# Patient Record
Sex: MALE | Race: WHITE | Employment: FULL TIME | URBAN - METROPOLITAN AREA
[De-identification: names, ages, dates, MRNs, and addresses within clinical notes are randomized per-mention and may not be internally consistent; named-entity substitution may affect disease eponyms.]

---

## 2017-02-13 ENCOUNTER — TELEPHONE (OUTPATIENT)
Dept: INTERNAL MEDICINE | Facility: CLINIC | Age: 57
End: 2017-02-13

## 2017-02-13 DIAGNOSIS — E78.5 HYPERLIPIDEMIA LDL GOAL <130: ICD-10-CM

## 2017-02-13 DIAGNOSIS — I10 ESSENTIAL HYPERTENSION, BENIGN: ICD-10-CM

## 2017-02-13 RX ORDER — LISINOPRIL 40 MG/1
40 TABLET ORAL DAILY
Qty: 90 TABLET | Refills: 3 | Status: SHIPPED | OUTPATIENT
Start: 2017-02-13 | End: 2017-02-15

## 2017-02-13 RX ORDER — PRAVASTATIN SODIUM 40 MG
40 TABLET ORAL AT BEDTIME
Qty: 90 TABLET | Refills: 3 | Status: SHIPPED | OUTPATIENT
Start: 2017-02-13 | End: 2017-02-15

## 2017-02-13 NOTE — TELEPHONE ENCOUNTER
Reason for Call:  Medication or medication refill:    Do you use a [facility]?  Name of the pharmacy and phone number for the current request:  [facility] [address] [place] - [phone]    Name of the medication requested: lisinopril, pravastatin    Other request: pt is out of medication--pt has an appt with Dr Suggs 2/15    Can we leave a detailed message on this number? YES    Phone number patient can be reached at: Other phone number:  [phone]--Wife,Suzanne Kowalski Time: anytime    Call taken on 2/13/2017 at 10:18 AM by TEA GILL

## 2017-02-14 DIAGNOSIS — I10 ESSENTIAL HYPERTENSION, BENIGN: ICD-10-CM

## 2017-02-14 DIAGNOSIS — Z12.5 SPECIAL SCREENING FOR MALIGNANT NEOPLASM OF PROSTATE: ICD-10-CM

## 2017-02-14 DIAGNOSIS — E78.5 HYPERLIPIDEMIA LDL GOAL <130: ICD-10-CM

## 2017-02-14 LAB
ALBUMIN SERPL-MCNC: 4.2 G/DL (ref 3.4–5)
ALP SERPL-CCNC: 78 U/L (ref 40–150)
ALT SERPL W P-5'-P-CCNC: 53 U/L (ref 0–70)
ANION GAP SERPL CALCULATED.3IONS-SCNC: 6 MMOL/L (ref 3–14)
AST SERPL W P-5'-P-CCNC: 29 U/L (ref 0–45)
BASOPHILS # BLD AUTO: 0 10E9/L (ref 0–0.2)
BASOPHILS NFR BLD AUTO: 0.3 %
BILIRUB SERPL-MCNC: 0.6 MG/DL (ref 0.2–1.3)
BUN SERPL-MCNC: 10 MG/DL (ref 7–30)
CALCIUM SERPL-MCNC: 9.3 MG/DL (ref 8.5–10.1)
CHLORIDE SERPL-SCNC: 103 MMOL/L (ref 94–109)
CHOLEST SERPL-MCNC: 180 MG/DL
CO2 SERPL-SCNC: 29 MMOL/L (ref 20–32)
CREAT SERPL-MCNC: 0.7 MG/DL (ref 0.66–1.25)
DIFFERENTIAL METHOD BLD: NORMAL
EOSINOPHIL # BLD AUTO: 0.2 10E9/L (ref 0–0.7)
EOSINOPHIL NFR BLD AUTO: 2.1 %
ERYTHROCYTE [DISTWIDTH] IN BLOOD BY AUTOMATED COUNT: 12.9 % (ref 10–15)
GFR SERPL CREATININE-BSD FRML MDRD: ABNORMAL ML/MIN/1.7M2
GLUCOSE SERPL-MCNC: 100 MG/DL (ref 70–99)
HCT VFR BLD AUTO: 47.9 % (ref 40–53)
HDLC SERPL-MCNC: 35 MG/DL
HGB BLD-MCNC: 16.1 G/DL (ref 13.3–17.7)
LDLC SERPL CALC-MCNC: 111 MG/DL
LYMPHOCYTES # BLD AUTO: 2.5 10E9/L (ref 0.8–5.3)
LYMPHOCYTES NFR BLD AUTO: 35.1 %
MCH RBC QN AUTO: 30.6 PG (ref 26.5–33)
MCHC RBC AUTO-ENTMCNC: 33.6 G/DL (ref 31.5–36.5)
MCV RBC AUTO: 91 FL (ref 78–100)
MONOCYTES # BLD AUTO: 0.9 10E9/L (ref 0–1.3)
MONOCYTES NFR BLD AUTO: 13.1 %
NEUTROPHILS # BLD AUTO: 3.5 10E9/L (ref 1.6–8.3)
NEUTROPHILS NFR BLD AUTO: 49.4 %
NONHDLC SERPL-MCNC: 145 MG/DL
PLATELET # BLD AUTO: 245 10E9/L (ref 150–450)
POTASSIUM SERPL-SCNC: 3.8 MMOL/L (ref 3.4–5.3)
PROT SERPL-MCNC: 7.8 G/DL (ref 6.8–8.8)
PSA SERPL-ACNC: 0.5 UG/L (ref 0–4)
RBC # BLD AUTO: 5.27 10E12/L (ref 4.4–5.9)
SODIUM SERPL-SCNC: 138 MMOL/L (ref 133–144)
TRIGL SERPL-MCNC: 172 MG/DL
WBC # BLD AUTO: 7 10E9/L (ref 4–11)

## 2017-02-14 PROCEDURE — 36415 COLL VENOUS BLD VENIPUNCTURE: CPT | Performed by: INTERNAL MEDICINE

## 2017-02-14 PROCEDURE — 85025 COMPLETE CBC W/AUTO DIFF WBC: CPT | Performed by: INTERNAL MEDICINE

## 2017-02-14 PROCEDURE — G0103 PSA SCREENING: HCPCS | Performed by: INTERNAL MEDICINE

## 2017-02-14 PROCEDURE — 80061 LIPID PANEL: CPT | Performed by: INTERNAL MEDICINE

## 2017-02-14 PROCEDURE — 80053 COMPREHEN METABOLIC PANEL: CPT | Performed by: INTERNAL MEDICINE

## 2017-02-15 ENCOUNTER — OFFICE VISIT (OUTPATIENT)
Dept: INTERNAL MEDICINE | Facility: CLINIC | Age: 57
End: 2017-02-15
Payer: COMMERCIAL

## 2017-02-15 VITALS
HEIGHT: 70 IN | SYSTOLIC BLOOD PRESSURE: 162 MMHG | BODY MASS INDEX: 36.78 KG/M2 | HEART RATE: 91 BPM | WEIGHT: 256.9 LBS | OXYGEN SATURATION: 99 % | TEMPERATURE: 98.2 F | DIASTOLIC BLOOD PRESSURE: 68 MMHG

## 2017-02-15 DIAGNOSIS — I10 ESSENTIAL HYPERTENSION, BENIGN: ICD-10-CM

## 2017-02-15 DIAGNOSIS — E78.5 HYPERLIPIDEMIA LDL GOAL <130: ICD-10-CM

## 2017-02-15 DIAGNOSIS — Z00.00 ENCOUNTER FOR ROUTINE ADULT HEALTH EXAMINATION WITHOUT ABNORMAL FINDINGS: Primary | ICD-10-CM

## 2017-02-15 DIAGNOSIS — F51.01 PRIMARY INSOMNIA: ICD-10-CM

## 2017-02-15 DIAGNOSIS — K21.9 GASTROESOPHAGEAL REFLUX DISEASE WITHOUT ESOPHAGITIS: ICD-10-CM

## 2017-02-15 DIAGNOSIS — Z23 NEED FOR PROPHYLACTIC VACCINATION AND INOCULATION AGAINST INFLUENZA: ICD-10-CM

## 2017-02-15 DIAGNOSIS — J98.01 POST-INFECTION BRONCHOSPASM: ICD-10-CM

## 2017-02-15 PROCEDURE — 99396 PREV VISIT EST AGE 40-64: CPT | Performed by: INTERNAL MEDICINE

## 2017-02-15 RX ORDER — PRAVASTATIN SODIUM 40 MG
40 TABLET ORAL AT BEDTIME
Qty: 90 TABLET | Refills: 3 | Status: SHIPPED | OUTPATIENT
Start: 2017-02-15 | End: 2018-03-12

## 2017-02-15 RX ORDER — ZOLPIDEM TARTRATE 10 MG/1
5-10 TABLET ORAL
Qty: 30 TABLET | Refills: 1 | Status: SHIPPED | OUTPATIENT
Start: 2017-02-15 | End: 2017-04-10

## 2017-02-15 RX ORDER — ALBUTEROL SULFATE 90 UG/1
2 AEROSOL, METERED RESPIRATORY (INHALATION) EVERY 6 HOURS PRN
Qty: 1 INHALER | Refills: 2 | Status: SHIPPED | OUTPATIENT
Start: 2017-02-15 | End: 2017-05-09

## 2017-02-15 RX ORDER — LISINOPRIL 40 MG/1
40 TABLET ORAL DAILY
Qty: 90 TABLET | Refills: 3 | Status: SHIPPED | OUTPATIENT
Start: 2017-02-15 | End: 2018-03-12

## 2017-02-15 NOTE — PROGRESS NOTES
SUBJECTIVE:                                                    Ab Aguilar is a 56 year old male who presents to clinic today for the following health issues:      Hyperlipidemia Follow-Up      Rate your low fat/cholesterol diet?: good    Taking statin?  Yes, no muscle aches from statin    Other lipid medications/supplements?:  none     Hypertension Follow-up      Outpatient blood pressures are not being checked.    Low Salt Diet: no added salt       Amount of exercise or physical activity: active at work    Problems taking medications regularly: No    Medication side effects: none  Diet: low salt and low fat/cholesterol  Medication Followup of Ambien    Taking Medication as prescribed: NO-pt has been out, he currently has trouble staying asleep    Side Effects:  None    Medication Helping Symptoms:  Medication did help sx when he was taking the medication. Pt was taking OTC sleep aids like unisom w/ out relief         Injectable Influenza Immunization Documentation    1.  Is the person to be vaccinated sick today?  GETTING OVER A COLD    2. Does the person to be vaccinated have an allergy to eggs or to a component of the vaccine?  No    3. Has the person to be vaccinated today ever had a serious reaction to influenza vaccine in the past?  No    4. Has the person to be vaccinated ever had Guillain-Du Pont syndrome?  No     Form completed by Lois Harley CMA

## 2017-02-15 NOTE — PATIENT INSTRUCTIONS
"  *  Continue all medications at the same doses.  Contact your usual pharmacy if you need refills.       *  Return to see me in 1 year, sooner if needed.  Please get fasting labs done at the [facility] or any other [facility] lab 1-2 days before this appointment.  If you get the labs done at another [facility], make arrangements with them directly.  The orders will be in place.  Eat nothing for at least 8 hours prior to having these labs drawn.  Call [phone] to schedule appointments at [facility].       5 GOALS TO PREVENT VASCULAR DISEASE:     1.  Aggressive blood pressure control, under 130/80 ideally.  Using medications if needed.    Your blood pressure is under good control    BP Readings from Last 4 Encounters:   02/15/17 162/68   02/02/16 130/82   11/07/14 (!) 156/104   09/26/13 130/78       2.  Aggressive LDL cholesterol (\"bad cholesterol\") lowering as indicated.    Your goal is an LDL under 130 for sure, preferably under 100.  (If you have diabetes or previous vascular disease, the the LDL goals would be under 100 for sure, preferably under 70.)    New guidelines identify four high-risk groups who could benefit from statins:   *people with pre-existing heart disease, such as those who have had a heart attack;   *people ages 40 to 75 who have diabetes of any type  *patients ages 40 to 75 with at least a 7.5% risk of developing cardiovascular disease over the next decade, according to a formula described in the guidelines  *patients with the sort of super-high cholesterol that sometimes runs in families, as evidenced by an LDL of 190 milligrams per deciliter or higher    Your cholesterol levels are well controlled.    Recent Labs   Lab Test  02/14/17   0852  01/27/16   0746  10/30/14   0751  09/23/13   0821   CHOL  180  242*  220*  237*   HDL  35*  43  43  47   LDL  111*  168*  139*  129   TRIG  172*  155*  192*  303*   CHOLHDLRATIO   --    --   5.1*  5.1*       3.  " "Aggressive diabetic prevention, screening and/or management.      You do not have diabetes as of the most recent blood tests.     4.  No smoking    5.  Consider taking low dose aspirin (81 mg) tablet once per day over the age of 50, every day unless there is a specific reason that you cannot take aspirin (such as side effect, allergy, or you are on another \"blood thinner\").        --Based on your current cardiac risk factors, you should take Aspirin 81 mg once per day if you are over 50 years of age.         Post infectious Bronchospasm (wheezing):  ===============================================      *  Bronchospasm is irritation of the airways that causes them to spasm and temporarily \"narrow\" which causes coughing (usually minimal sputum production), shortness of breath, dyspnea on exertion, wheezing.  Your airways will be more reactive to things such as temperature changes (too cold, too hot, too humid, etc.), activity, pollutants such as dander, smoke, air pollution, etc.  This will get better with time, but will produce lingering symptoms as described above for a couple weeks to a couple of months.     *  Antibiotics not indicated    *  Albuterol inhaler, use 2 puffs, 3-5 times per day as needed for any coughing, wheezing, tightness in the breathing, or shortness of breath.  This inhaled medication helps reduce the inflammation and spasm of the airways which will help the breathing become easier.  This is a similar medication we use to treat asthma, but you do not have asthma.      Consider using this inhaler before any known triggers for our coughing or wheezing (usually these include cold or hot temperatures, humidity, dust, air pollutants, smoke).      *  Expect that your airways may remain more easily irritated for a few weeks after upper respiratory infections.     If you require the albuterol rescue inhaler on a regular basis more than a few times per week, you may need additional medications to help " control the breathing better.    These are the available forms of Albuterol, your insurance formulary will determine which one is preferred.  They all work the same.                 *  Cough suppressant Delsym, follow directions on bottle    *  Mucinex extended release, one or two tablets twice per day for the next 5-7 days.  This helps loosen the secretions to they can be passed more easily out of the body.     *  Be sure to drink lots of fluids.  If the appetite and intake of food is way down, then at leat try to eat soup.  Dehydration is the thing that causes people to end up in the hospital with viral infections and the flu.     *  For sore throat:  Try lozenges (Cepostat, Cepacol, hard candies, Zinc lozenges, etc)    *  If you have thick secretions:  Mucinex extended release twice per day on a regular basis for the next few days, then twice per day as needed.  OK to take the regular Mucinex, you may just have to take it 2-3 times per day.      *  If you have dry nasal passages or frequent bloody noses during the winter time:  Saline nasal spray as often as needed for dry nose.     *  If you have a lot of congestion and/or runny noses:  OK to try decongestants as needed (e.g. pseudoephedrine or phenylephrine).  Be sure to take the lowest dose needed.  Take decongestants from only ONE source.  It is possible to inadvertantly take more than the recommended amounts if you take decongestants from multiple products (i.e. Do NOT take Mucinex-D and Claritin-D together)    *  If you have been told to NOT take decongestants or if you cannot tolerate decongestants due to effect on blood pressure, sleep or heart rate:  Try Coricidin HBP or Chlortrimeton (chlorpheniramine).  These can have a similar effect as some decongestants but will not affect your heart rate or blood pressure.      *  If you have SEVERE nasal congestion:  Affrin nasal spray as needed for severe nasal congestion (especially before the airplane trip),  but do not use for more than one week.      *  Tylenol as needed for any headaches of low grade temperatures.     *  Ibuprofen as needed for body aches, fevers, headaches    *  Call the clinic if any changes in the symptoms such as worsening fevers, or the amount and quality of the sputum changes, or if you have any major difficulties breathing, or the symptoms fail to clear for a few weeks.    *  Most upper respiratory infection will clear 1-2 weeks, but it is not uncommon for post viral coughs to last for several weeks, even rarely the entire winter.             Preventive Health Recommendations  Male Ages 50 - 64    Yearly exam:             See your health care provider every year in order to  o   Review health changes.   o   Discuss preventive care.    o   Review your medicines if your doctor has prescribed any.     Have a cholesterol test every 5 years, or more frequently if you are at risk for high cholesterol/heart disease.     Have a diabetes test (fasting glucose) every three years. If you are at risk for diabetes, you should have this test more often.     Have a colonoscopy at age 50, or have a yearly FIT test (stool test). These exams will check for colon cancer.      Talk with your health care provider about whether or not a prostate cancer screening test (PSA) is right for you.    You should be tested each year for STDs (sexually transmitted diseases), if you re at risk.     Shots: Get a flu shot each year. Get a tetanus shot every 10 years.     Nutrition:    Eat at least 5 servings of fruits and vegetables daily.     Eat whole-grain bread, whole-wheat pasta and brown rice instead of white grains and rice.     Talk to your provider about Calcium and Vitamin D.        --Good Grains:  Oats, brown rice, Quinoa (these do not raise the blood sugar as much)     --Bad grains:  Anything made from wheat or white rice     (because these raise the blood sugars significantly, and the possible gluten issue from  "wheat for some people).      --Proteins:  Aim for \"lean proteins\" including chicken, fish, seafood, pork, turkey, and eggs (in moderation); Eat red meat only occasionally        Stan Portillo:                    Lifestyle    Exercise for at least 150 minutes a week (30 minutes a day, 5 days a week). This will help you control your weight and prevent disease.     Limit alcohol to one drink per day.     No smoking.     Wear sunscreen to prevent skin cancer.     See your dentist every six months for an exam and cleaning.     See your eye doctor every 1 to 2 years.    "

## 2017-02-15 NOTE — PROGRESS NOTES
SUBJECTIVE:     CC: Ab Aguilar is an 56 year old male who presents for preventative health visit.     Healthy Habits:    Do you get at least three servings of calcium containing foods daily (dairy, green leafy vegetables, etc.)? yes    Amount of exercise or daily activities, outside of work:     Problems taking medications regularly No    Medication side effects: No    Have you had an eye exam in the past two years? yes    Do you see a dentist twice per year? yes    Do you have sleep apnea, excessive snoring or daytime drowsiness?no    1.    Blood presure remains well controlled at home.  He ran out of medications a few days ago and has been without meds for few days.  Readings outside clinic are within normal limits.  Reviewed last 6 BP readings in chart:  BP Readings from Last 6 Encounters:   02/27/17 124/82   02/15/17 162/68   02/02/16 130/82   11/07/14 (!) 156/104   09/26/13 130/78   09/26/12 136/80     He has not experienced any significant side effects from medicaiotns for hypertension.    NO active cardiac complaints or symptoms with exercise.     2.  Has history of hyperlipidemia.  On statin for this, denies any significant side effects of this medication.      Latest labs reviewed:    Recent Labs   Lab Test  02/14/17   0852  01/27/16   0746  10/30/14   0751  09/23/13   0821   CHOL  180  242*  220*  237*   HDL  35*  43  43  47   LDL  111*  168*  139*  129   TRIG  172*  155*  192*  303*   CHOLHDLRATIO   --    --   5.1*  5.1*        Lab Results   Component Value Date    AST 29 02/14/2017             Today's PHQ-2 Score:   PHQ-2 ( 1999 Pfizer) 2/27/2017 2/15/2017   Q1: Little interest or pleasure in doing things 0 0   Q2: Feeling down, depressed or hopeless 0 0   PHQ-2 Score 0 0       Abuse: Current or Past(Physical, Sexual or Emotional)- No  Do you feel safe in your environment - Yes    Social History   Substance Use Topics     Smoking status: Never Smoker     Smokeless tobacco: Never Used     Alcohol  use Yes      Comment: 12-18 q week BEER     The patient does not drink >3 drinks per day nor >7 drinks per week.    Last PSA:   PSA   Date Value Ref Range Status   02/14/2017 0.50 0 - 4 ug/L Final     Comment:     Assay Method:  Chemiluminescence using Siemens Vista analyzer       Recent Labs   Lab Test  02/14/17   0852  01/27/16   0746  10/30/14   0751  09/23/13   0821   CHOL  180  242*  220*  237*   HDL  35*  43  43  47   LDL  111*  168*  139*  129   TRIG  172*  155*  192*  303*   CHOLHDLRATIO   --    --   5.1*  5.1*   NHDL  145*  199*   --    --        Reviewed orders with patient. Reviewed health maintenance and updated orders accordingly - Yes    All Histories reviewed and updated in Epic.    Past Medical History:  ---------------------------  Past Medical History   Diagnosis Date     Esophageal reflux      Essential hypertension, benign      Major depression      Obesity      Other and unspecified hyperlipidemia      Prediabetes 9/26/12     A1C 6.1       Past Surgical History:  ---------------------------  Past Surgical History   Procedure Laterality Date     C nonspecific procedure  1991     R ankle fracture, repair     C nonspecific procedure  age 6     B inguinal hernia repair     C nonspecific procedure  9/01     lumbar laminectomy     Stress echo ([facility])  10/02     normal      Stress echo ([facility])  9/05     normal stress echo       Current Medications:  ---------------------------  Current Outpatient Prescriptions   Medication Sig Dispense Refill     zolpidem (AMBIEN) 10 MG tablet Take 0.5-1 tablets (5-10 mg) by mouth nightly as needed for sleep 30 tablet 1     lisinopril (PRINIVIL/ZESTRIL) 40 MG tablet Take 1 tablet (40 mg) by mouth daily 90 tablet 3     pravastatin (PRAVACHOL) 40 MG tablet Take 1 tablet (40 mg) by mouth At Bedtime 90 tablet 3     albuterol (PROAIR HFA/PROVENTIL HFA/VENTOLIN HFA) 108 (90 BASE) MCG/ACT Inhaler Inhale 2 puffs into the lungs every 6 hours as needed for shortness of breath /  dyspnea or wheezing 1 Inhaler 2     omeprazole (PRILOSEC) 20 MG capsule Take 1 capsule by mouth daily.       ibuprofen (ADVIL,MOTRIN) 200 MG tablet Take 800 mg by mouth 3 times daily as needed.       ASPIRIN 81 MG OR TABS ONE DAILY       MULTIVITAMINS OR TABS        guaiFENesin-codeine (ROBITUSSIN AC) 100-10 MG/5ML SOLN solution Take 5-10 mLs by mouth every 6 hours as needed (FOR SEVERE COUGHING ONLY) 180 mL 0       Allergies:  -------------  Allergies   Allergen Reactions     Atorvastatin GI Disturbance     Abdominal and intestinal pains from atorvastatin       Social History:  -------------------  Social History     Social History     Marital status:      Spouse name: N/A     Number of children: N/A     Years of education: N/A     Occupational History     Not on file.     Social History Main Topics     Smoking status: Never Smoker     Smokeless tobacco: Never Used     Alcohol use Yes      Comment: 12-18 q week BEER     Drug use: No     Sexual activity: Yes     Partners: Female     Other Topics Concern     Caffeine Concern No     Social History Narrative       Family Medical History:  ------------------------------  Family History   Problem Relation Age of Onset     Hypertension Father      HEART DISEASE Brother 37     b. 1967, 1st MI at age 37     Family History Negative Mother      Family History Negative Brother      b. 1965     Family History Negative Sister      b. 1961        ROS:  C: NEGATIVE for fever, chills, change in weight  I: NEGATIVE for worrisome rashes, moles or lesions  E: NEGATIVE for vision changes or irritation  ENT: NEGATIVE for ear, mouth and throat problems  R: NEGATIVE for significant cough or SOB  CV: NEGATIVE for chest pain, palpitations or peripheral edema  GI: NEGATIVE for nausea, abdominal pain, heartburn, or change in bowel habits   male: negative for dysuria, hematuria, decreased urinary stream, erectile dysfunction, urethral discharge  M: NEGATIVE for significant arthralgias  "or myalgia  N: NEGATIVE for weakness, dizziness or paresthesias  P: NEGATIVE for changes in mood or affect      OBJECTIVE:     /68  Pulse 91  Temp 98.2  F (36.8  C) (Oral)  Ht 5' 10\" (1.778 m)  Wt 256 lb 14.4 oz (116.5 kg)  SpO2 99%  BMI 36.86 kg/m2  EXAM:  GENERAL alert and no distress.  EYES conjunctivae/corneas clear. PERRL, EOM's intact  HENT: NCAT,oral and posterior pharynx without lesions or erythema, facies symmetric  NECK: Neck supple. No LAD, without thyroidmegaly or JVD.  RESP: Clear to ausculation bilaterally without wheezes or crackles. Normal BS in all fields.  CV: RRR normal S1S2 without  murmurs, rubs or gallops. PMI normal  LYMPH: no cervical lymph adenopathy appreciated  GI: NTND, no organomegaly, normal BS in all quadrants, without rebound or guarding  MS: No cyanosis, clubbing or edema noted bilaterally in Upper and/or Lower Extremities  SKIN: no significant ulcers, lesions or rashes on the visualized portions of the skin  NEURO: Alert and Oriented x 3, Gait normal. Reflexes normal and symmetric. Sensation grossly WNL..  PSYCH: Alert and oriented times 3; speech- coherent , normal rate and volume; able to articulate logical thoughts, able to abstract reason, no tangential thoughts, no hallucinations or delusions, affect- normal     ASSESSMENT/PLAN:       (Z00.00) Encounter for routine adult health examination without abnormal findings  (primary encounter diagnosis)  Comment: Discussed cardiac disease risk factor modification including screening for and treating HTN, lipids, DM, and smoking cessation.  Also discussed age appropriate cancer screening recommendations including testicular, prostate, colon and lung cancer as dictated by age group.  Recommended low fat, low salt diet and moderation in any alcohol intake.  Recommended always using seatbelts when in a car.  Recommended never driving after drinking or riding with someone who has been drinking as well.       Plan:     (I10) Essential " hypertension, benign  Comment: This condition is currently controlled on the current medical regimen when he takes his medications.  He ran out of medications a few days ago and has been without meds for few days.  Continue current therapy.   Discussed hypertension in detail including JNC VIII guidelines for blood pressure goals.  Discussed indication for treatment and treatment options.  Discussed the importance for aggressive management of HTN to prevent vascular complications later.  Recommended lower fat, lower carbohydrate, and lower sodium (<2000 mg)diet.  Discussed required intervals for follow up on HTN, lab studies, and the need to aggresive management of other cardiac disease risk factors.  Recommened pt. follow their blood pressures outside the clinic to ensure that BPs are remaining within guidelines, and to contact me if the readings are not within guidelines so we can adjust treatment as needed.    Plan: lisinopril (PRINIVIL/ZESTRIL) 40 MG tablet            (E78.5) Hyperlipidemia LDL goal <130  Comment: Discussed current lipid results, previous results (if available) current guidelines (NCEP) for treatment and goals for lipids.  Discussed lifestyle modification, dietary changes (low fat, low simple carb) and regular aerobic exercise.  Discussed the link between dysmetabolic syndrome and impaired glucose tolerance seen in certain patterns of lipids.  Briefly discussed medication used for lipid lowering, including the statins are their possible side effects of myalgias, rhabdomyolysis, and liver toxicity.   Plan: pravastatin (PRAVACHOL) 40 MG tablet            (K21.9) Gastroesophageal reflux disease without esophagitis  Comment: This condition is currently controlled on the current medical regimen.  Continue current therapy.   Plan:     (F51.01) Primary insomnia  Comment:   Plan: zolpidem (AMBIEN) 10 MG tablet            (J98.01) Post-infection bronchospasm  Comment: occ coughing, wants refill sof  "inhaler  Plan: albuterol (PROAIR HFA/PROVENTIL HFA/VENTOLIN         HFA) 108 (90 BASE) MCG/ACT Inhaler            (Z23) Need for prophylactic vaccination and inoculation against influenza  Comment:   Plan: FLU VACCINE, 3 YRS +, IM (QUADRIVALENT         W/PRESERVATIVES/MULTI-DOSE) [47943], Vaccine         Administration, Initial [95005]               COUNSELING:  Reviewed preventive health counseling, as reflected in patient instructions       Regular exercise       Healthy diet/nutrition       Vision screening       Hearing screening       Aspirin Prophylaxsis       Alcohol Use       Colon cancer screening       Prostate cancer screening         reports that he has never smoked. He has never used smokeless tobacco.    Estimated body mass index is 36.86 kg/(m^2) as calculated from the following:    Height as of this encounter: 5' 10\" (1.778 m).    Weight as of this encounter: 256 lb 14.4 oz (116.5 kg).       Counseling Resources:  ATP IV Guidelines  Pooled Cohorts Equation Calculator  FRAX Risk Assessment  ICSI Preventive Guidelines  Dietary Guidelines for Americans, 2010  USDA's MyPlate  ASA Prophylaxis  Lung CA Screening    Shayne Suggs MD  [facility]  "

## 2017-02-15 NOTE — NURSING NOTE
"Chief Complaint   Patient presents with     Hypertension     med refill/recheck/review labs     Lipids     med refill/recheck/review labs       Initial /68  Pulse 91  Temp 98.2  F (36.8  C) (Oral)  Ht 5' 10\" (1.778 m)  Wt 256 lb 14.4 oz (116.5 kg)  SpO2 99%  BMI 36.86 kg/m2 Estimated body mass index is 36.86 kg/(m^2) as calculated from the following:    Height as of this encounter: 5' 10\" (1.778 m).    Weight as of this encounter: 256 lb 14.4 oz (116.5 kg).  Medication Reconciliation: complete   Lois Harley CMA      "

## 2017-02-15 NOTE — MR AVS SNAPSHOT
"              After Visit Summary   2/15/2017    Ab Aguilar    MRN: [id number]           Patient Information     Date Of Birth          1960        Visit Information        Provider Department      2/15/2017 1:40 PM Shayne Suggs MD [facility]        Today's Diagnoses     Encounter for routine adult health examination without abnormal findings    -  1    Essential hypertension, benign        Hyperlipidemia LDL goal <130        Gastroesophageal reflux disease without esophagitis        Primary insomnia        Post-infection bronchospasm        Need for prophylactic vaccination and inoculation against influenza          Care Instructions      *  Continue all medications at the same doses.  Contact your usual pharmacy if you need refills.       *  Return to see me in 1 year, sooner if needed.  Please get fasting labs done at the [facility] or any other [facility] lab 1-2 days before this appointment.  If you get the labs done at another [facility], make arrangements with them directly.  The orders will be in place.  Eat nothing for at least 8 hours prior to having these labs drawn.  Call [phone] to schedule appointments at [facility].       5 GOALS TO PREVENT VASCULAR DISEASE:     1.  Aggressive blood pressure control, under 130/80 ideally.  Using medications if needed.    Your blood pressure is under good control    BP Readings from Last 4 Encounters:   02/15/17 162/68   02/02/16 130/82   11/07/14 (!) 156/104   09/26/13 130/78       2.  Aggressive LDL cholesterol (\"bad cholesterol\") lowering as indicated.    Your goal is an LDL under 130 for sure, preferably under 100.  (If you have diabetes or previous vascular disease, the the LDL goals would be under 100 for sure, preferably under 70.)    New guidelines identify four high-risk groups who could benefit from statins:   *people with pre-existing heart disease, such as those who have had " "a heart attack;   *people ages 40 to 75 who have diabetes of any type  *patients ages 40 to 75 with at least a 7.5% risk of developing cardiovascular disease over the next decade, according to a formula described in the guidelines  *patients with the sort of super-high cholesterol that sometimes runs in families, as evidenced by an LDL of 190 milligrams per deciliter or higher    Your cholesterol levels are well controlled.    Recent Labs   Lab Test  02/14/17   0852  01/27/16   0746  10/30/14   0751  09/23/13   0821   CHOL  180  242*  220*  237*   HDL  35*  43  43  47   LDL  111*  168*  139*  129   TRIG  172*  155*  192*  303*   CHOLHDLRATIO   --    --   5.1*  5.1*       3.  Aggressive diabetic prevention, screening and/or management.      You do not have diabetes as of the most recent blood tests.     4.  No smoking    5.  Consider taking low dose aspirin (81 mg) tablet once per day over the age of 50, every day unless there is a specific reason that you cannot take aspirin (such as side effect, allergy, or you are on another \"blood thinner\").        --Based on your current cardiac risk factors, you should take Aspirin 81 mg once per day if you are over 50 years of age.         Post infectious Bronchospasm (wheezing):  ===============================================      *  Bronchospasm is irritation of the airways that causes them to spasm and temporarily \"narrow\" which causes coughing (usually minimal sputum production), shortness of breath, dyspnea on exertion, wheezing.  Your airways will be more reactive to things such as temperature changes (too cold, too hot, too humid, etc.), activity, pollutants such as dander, smoke, air pollution, etc.  This will get better with time, but will produce lingering symptoms as described above for a couple weeks to a couple of months.     *  Antibiotics not indicated    *  Albuterol inhaler, use 2 puffs, 3-5 times per day as needed for any coughing, wheezing, tightness in the " breathing, or shortness of breath.  This inhaled medication helps reduce the inflammation and spasm of the airways which will help the breathing become easier.  This is a similar medication we use to treat asthma, but you do not have asthma.      Consider using this inhaler before any known triggers for our coughing or wheezing (usually these include cold or hot temperatures, humidity, dust, air pollutants, smoke).      *  Expect that your airways may remain more easily irritated for a few weeks after upper respiratory infections.     If you require the albuterol rescue inhaler on a regular basis more than a few times per week, you may need additional medications to help control the breathing better.    These are the available forms of Albuterol, your insurance formulary will determine which one is preferred.  They all work the same.                 *  Cough suppressant Delsym, follow directions on bottle    *  Mucinex extended release, one or two tablets twice per day for the next 5-7 days.  This helps loosen the secretions to they can be passed more easily out of the body.     *  Be sure to drink lots of fluids.  If the appetite and intake of food is way down, then at leat try to eat soup.  Dehydration is the thing that causes people to end up in the hospital with viral infections and the flu.     *  For sore throat:  Try lozenges (Cepostat, Cepacol, hard candies, Zinc lozenges, etc)    *  If you have thick secretions:  Mucinex extended release twice per day on a regular basis for the next few days, then twice per day as needed.  OK to take the regular Mucinex, you may just have to take it 2-3 times per day.      *  If you have dry nasal passages or frequent bloody noses during the winter time:  Saline nasal spray as often as needed for dry nose.     *  If you have a lot of congestion and/or runny noses:  OK to try decongestants as needed (e.g. pseudoephedrine or phenylephrine).  Be sure to take the lowest dose  needed.  Take decongestants from only ONE source.  It is possible to inadvertantly take more than the recommended amounts if you take decongestants from multiple products (i.e. Do NOT take Mucinex-D and Claritin-D together)    *  If you have been told to NOT take decongestants or if you cannot tolerate decongestants due to effect on blood pressure, sleep or heart rate:  Try Coricidin HBP or Chlortrimeton (chlorpheniramine).  These can have a similar effect as some decongestants but will not affect your heart rate or blood pressure.      *  If you have SEVERE nasal congestion:  Affrin nasal spray as needed for severe nasal congestion (especially before the airplane trip), but do not use for more than one week.      *  Tylenol as needed for any headaches of low grade temperatures.     *  Ibuprofen as needed for body aches, fevers, headaches    *  Call the clinic if any changes in the symptoms such as worsening fevers, or the amount and quality of the sputum changes, or if you have any major difficulties breathing, or the symptoms fail to clear for a few weeks.    *  Most upper respiratory infection will clear 1-2 weeks, but it is not uncommon for post viral coughs to last for several weeks, even rarely the entire winter.             Preventive Health Recommendations  Male Ages 50 - 64    Yearly exam:             See your health care provider every year in order to  o   Review health changes.   o   Discuss preventive care.    o   Review your medicines if your doctor has prescribed any.     Have a cholesterol test every 5 years, or more frequently if you are at risk for high cholesterol/heart disease.     Have a diabetes test (fasting glucose) every three years. If you are at risk for diabetes, you should have this test more often.     Have a colonoscopy at age 50, or have a yearly FIT test (stool test). These exams will check for colon cancer.      Talk with your health care provider about whether or not a prostate  "cancer screening test (PSA) is right for you.    You should be tested each year for STDs (sexually transmitted diseases), if you re at risk.     Shots: Get a flu shot each year. Get a tetanus shot every 10 years.     Nutrition:    Eat at least 5 servings of fruits and vegetables daily.     Eat whole-grain bread, whole-wheat pasta and brown rice instead of white grains and rice.     Talk to your provider about Calcium and Vitamin D.        --Good Grains:  Oats, brown rice, Quinoa (these do not raise the blood sugar as much)     --Bad grains:  Anything made from wheat or white rice     (because these raise the blood sugars significantly, and the possible gluten issue from wheat for some people).      --Proteins:  Aim for \"lean proteins\" including chicken, fish, seafood, pork, turkey, and eggs (in moderation); Eat red meat only occasionally        Stan Portillo:                    Lifestyle    Exercise for at least 150 minutes a week (30 minutes a day, 5 days a week). This will help you control your weight and prevent disease.     Limit alcohol to one drink per day.     No smoking.     Wear sunscreen to prevent skin cancer.     See your dentist every six months for an exam and cleaning.     See your eye doctor every 1 to 2 years.          Follow-ups after your visit        Who to contact     If you have questions or need follow up information about today's clinic visit or your schedule please contact [facility] directly at [phone].  Normal or non-critical lab and imaging results will be communicated to you by MyChart, letter or phone within 4 business days after the clinic has received the results. If you do not hear from us within 7 days, please contact the clinic through MyChart or phone. If you have a critical or abnormal lab result, we will notify you by phone as soon as possible.  Submit refill requests through [employer] or call your pharmacy and they will forward the refill request to " "us. Please allow 3 business days for your refill to be completed.          Additional Information About Your Visit        MyChart Information     [employer] lets you send messages to your doctor, view your test results, renew your prescriptions, schedule appointments and more. To sign up, go to [email or web address] . Click on \"Log in\" on the left side of the screen, which will take you to the Welcome page. Then click on \"Sign up Now\" on the right side of the page.     You will be asked to enter the access code listed below, as well as some personal information. Please follow the directions to create your username and password.     Your access code is: [email or web address]  Expires: 2017  2:27 PM     Your access code will  in 90 days. If you need help or a new code, please call your [facility] clinic or [phone].        Care EveryWhere ID     This is your Care EveryWhere ID. This could be used by other organizations to access your [facility] medical records  [id number]        Your Vitals Were     Pulse Temperature Height Pulse Oximetry BMI (Body Mass Index)       91 98.2  F (36.8  C) (Oral) 5' 10\" (1.778 m) 99% 36.86 kg/m2        Blood Pressure from Last 3 Encounters:   02/15/17 162/68   16 130/82   14 (!) 156/104    Weight from Last 3 Encounters:   02/15/17 256 lb 14.4 oz (116.5 kg)   16 273 lb (123.8 kg)   14 265 lb 11.2 oz (120.5 kg)              We Performed the Following     FLU VACCINE, 3 YRS +, IM (QUADRIVALENT W/PRESERVATIVES/MULTI-DOSE) [09963]     Vaccine Administration, Initial [55453]          Today's Medication Changes          These changes are accurate as of: 2/15/17  2:27 PM.  If you have any questions, ask your nurse or doctor.               Start taking these medicines.        Dose/Directions    albuterol 108 (90 BASE) MCG/ACT Inhaler   Commonly known as:  PROAIR HFA/PROVENTIL HFA/VENTOLIN HFA   Used for:  Post-infection bronchospasm   Started by:  Shayne Suggs " MD Dixon        Dose:  2 puff   Inhale 2 puffs into the lungs every 6 hours as needed for shortness of breath / dyspnea or wheezing   Quantity:  1 Inhaler   Refills:  2            Where to get your medicines      These medications were sent to [id number] - [place], [place] - [address] AT [facility] [address]  [address], [facility] [address]    Hours:  24-hours Phone:  [phone]     lisinopril 40 MG tablet    pravastatin 40 MG tablet         Some of these will need a paper prescription and others can be bought over the counter.  Ask your nurse if you have questions.     Bring a paper prescription for each of these medications     albuterol 108 (90 BASE) MCG/ACT Inhaler    zolpidem 10 MG tablet                Primary Care Provider Office Phone # Fax #    Shayne Dixon Suggs [phone]       [facility] [address]  [facility] [address]        Thank you!     Thank you for choosing [facility]  for your care. Our goal is always to provide you with excellent care. Hearing back from our patients is one way we can continue to improve our services. Please take a few minutes to complete the written survey that you may receive in the mail after your visit with us. Thank you!             Your Updated Medication List - Protect others around you: Learn how to safely use, store and throw away your medicines at www.disposemymeds.org.          This list is accurate as of: 2/15/17  2:27 PM.  Always use your most recent med list.                   Brand Name Dispense Instructions for use    albuterol 108 (90 BASE) MCG/ACT Inhaler    PROAIR HFA/PROVENTIL HFA/VENTOLIN HFA    1 Inhaler    Inhale 2 puffs into the lungs every 6 hours as needed for shortness of breath / dyspnea or wheezing       aspirin 81 MG tablet      ONE DAILY       ibuprofen 200 MG tablet    ADVIL/MOTRIN     Take 800 mg by mouth 3 times daily as needed.       lisinopril  40 MG tablet    PRINIVIL/ZESTRIL    90 tablet    Take 1 tablet (40 mg) by mouth daily       multivitamin per tablet          omeprazole 20 MG CR capsule    priLOSEC     Take 1 capsule by mouth daily.       pravastatin 40 MG tablet    PRAVACHOL    90 tablet    Take 1 tablet (40 mg) by mouth At Bedtime       zolpidem 10 MG tablet    AMBIEN    30 tablet    Take 0.5-1 tablets (5-10 mg) by mouth nightly as needed for sleep

## 2017-02-27 ENCOUNTER — OFFICE VISIT (OUTPATIENT)
Dept: INTERNAL MEDICINE | Facility: CLINIC | Age: 57
End: 2017-02-27
Payer: COMMERCIAL

## 2017-02-27 VITALS
DIASTOLIC BLOOD PRESSURE: 82 MMHG | BODY MASS INDEX: 36.72 KG/M2 | OXYGEN SATURATION: 98 % | WEIGHT: 255.9 LBS | SYSTOLIC BLOOD PRESSURE: 124 MMHG | TEMPERATURE: 98.8 F | HEART RATE: 87 BPM

## 2017-02-27 DIAGNOSIS — R07.0 THROAT PAIN: ICD-10-CM

## 2017-02-27 DIAGNOSIS — J06.9 VIRAL URI WITH COUGH: ICD-10-CM

## 2017-02-27 DIAGNOSIS — F32.5 MAJOR DEPRESSIVE DISORDER WITH SINGLE EPISODE, IN FULL REMISSION (H): ICD-10-CM

## 2017-02-27 DIAGNOSIS — J98.01 BRONCHOSPASM: Primary | ICD-10-CM

## 2017-02-27 DIAGNOSIS — J02.9 ACUTE PHARYNGITIS, UNSPECIFIED ETIOLOGY: ICD-10-CM

## 2017-02-27 LAB
DEPRECATED S PYO AG THROAT QL EIA: NORMAL
MICRO REPORT STATUS: NORMAL
SPECIMEN SOURCE: NORMAL

## 2017-02-27 PROCEDURE — 99214 OFFICE O/P EST MOD 30 MIN: CPT | Performed by: INTERNAL MEDICINE

## 2017-02-27 PROCEDURE — 87081 CULTURE SCREEN ONLY: CPT | Performed by: INTERNAL MEDICINE

## 2017-02-27 PROCEDURE — 87880 STREP A ASSAY W/OPTIC: CPT | Performed by: INTERNAL MEDICINE

## 2017-02-27 RX ORDER — CODEINE PHOSPHATE AND GUAIFENESIN 10; 100 MG/5ML; MG/5ML
1-2 SOLUTION ORAL EVERY 6 HOURS PRN
Qty: 180 ML | Refills: 0 | Status: SHIPPED | OUTPATIENT
Start: 2017-02-27 | End: 2017-03-13

## 2017-02-27 RX ORDER — PREDNISONE 20 MG/1
40 TABLET ORAL DAILY
Qty: 10 TABLET | Refills: 0 | Status: SHIPPED | OUTPATIENT
Start: 2017-02-27 | End: 2017-03-04

## 2017-02-27 RX ORDER — AZITHROMYCIN 250 MG/1
TABLET, FILM COATED ORAL
Qty: 6 TABLET | Refills: 0 | Status: SHIPPED | OUTPATIENT
Start: 2017-02-27 | End: 2017-03-04

## 2017-02-27 NOTE — NURSING NOTE
"Chief Complaint   Patient presents with     Cough     X 3 weeks       Initial /82  Pulse 87  Temp 98.8  F (37.1  C) (Oral)  Wt 255 lb 14.4 oz (116.1 kg)  SpO2 98%  BMI 36.72 kg/m2 Estimated body mass index is 36.72 kg/(m^2) as calculated from the following:    Height as of 2/15/17: 5' 10\" (1.778 m).    Weight as of this encounter: 255 lb 14.4 oz (116.1 kg).  Medication Reconciliation: complete   Lois Harley CMA      "

## 2017-02-27 NOTE — MR AVS SNAPSHOT
"              After Visit Summary   2/27/2017    Ab Aguilar    MRN: [id number]           Patient Information     Date Of Birth          1960        Visit Information        Provider Department      2/27/2017 11:00 AM Shayne Suggs MD [facility]        Today's Diagnoses     Throat pain    -  1    Bronchospasm        Viral URI with cough        Acute pharyngitis, unspecified etiology          Care Instructions    Post infectious Bronchospasm (wheezing):  ===============================================      *  Bronchospasm is irritation of the airways that causes them to spasm and temporarily \"narrow\" which causes coughing (usually minimal sputum production), shortness of breath, dyspnea on exertion, wheezing.  Your airways will be more reactive to things such as temperature changes (too cold, too hot, too humid, etc.), activity, pollutants such as dander, smoke, air pollution, etc.  This will get better with time, but will produce lingering symptoms as described above for a couple weeks to a couple of months.     *  Antibiotics possibly indicated:  Azithromycin., 2 tablet today, then one tablet per day for 4 days, (5 day total course)    *  Due to the degree of inflammation inside the airways, take Prednisone 40 mg once per day for 5 days.      *  *  For severe coughing, OK to use Rpbitussin with codeine cough syrup, take 1 or 2 teaspoons (5-10 ml) every 4-6 hours as needed for severe coughing.  This is a powerful cough suppressant.  Beware of drowsiness, nausea, vomiting, when taking this medication.  Do not drive, or operate dangerous equipment after taking this.       *  Albuterol inhaler, use 2 puffs, 3-5 times per day as needed for any coughing, wheezing, tightness in the breathing, or shortness of breath.  This inhaled medication helps reduce the inflammation and spasm of the airways which will help the breathing become easier.  This is a similar medication we use " to treat asthma, but you do not have asthma.      Consider using this inhaler before any known triggers for our coughing or wheezing (usually these include cold or hot temperatures, humidity, dust, air pollutants, smoke).      *  Expect that your airways may remain more easily irritated for a few weeks after upper respiratory infections.     If you require the albuterol rescue inhaler on a regular basis more than a few times per week, you may need additional medications to help control the breathing better.    These are the available forms of Albuterol, your insurance formulary will determine which one is preferred.  They all work the same.                 *  Cough suppressant Delsym, follow directions on bottle    *  Mucinex extended release, one or two tablets twice per day for the next 5-7 days.  This helps loosen the secretions to they can be passed more easily out of the body.     *  Be sure to drink lots of fluids.  If the appetite and intake of food is way down, then at leat try to eat soup.  Dehydration is the thing that causes people to end up in the hospital with viral infections and the flu.     *  For sore throat:  Try lozenges (Cepostat, Cepacol, hard candies, Zinc lozenges, etc)    *  If you have thick secretions:  Mucinex extended release twice per day on a regular basis for the next few days, then twice per day as needed.  OK to take the regular Mucinex, you may just have to take it 2-3 times per day.      *  If you have dry nasal passages or frequent bloody noses during the winter time:  Saline nasal spray as often as needed for dry nose.     *  If you have a lot of congestion and/or runny noses:  OK to try decongestants as needed (e.g. pseudoephedrine or phenylephrine).  Be sure to take the lowest dose needed.  Take decongestants from only ONE source.  It is possible to inadvertantly take more than the recommended amounts if you take decongestants from multiple products (i.e. Do NOT take Mucinex-D  and Claritin-D together)    *  If you have been told to NOT take decongestants or if you cannot tolerate decongestants due to effect on blood pressure, sleep or heart rate:  Try Coricidin HBP or Chlortrimeton (chlorpheniramine).  These can have a similar effect as some decongestants but will not affect your heart rate or blood pressure.      *  If you have SEVERE nasal congestion:  Affrin nasal spray as needed for severe nasal congestion (especially before the airplane trip), but do not use for more than one week.      *  Tylenol as needed for any headaches of low grade temperatures.     *  Ibuprofen as needed for body aches, fevers, headaches    *  Call the clinic if any changes in the symptoms such as worsening fevers, or the amount and quality of the sputum changes, or if you have any major difficulties breathing, or the symptoms fail to clear for a few weeks.    *  Most upper respiratory infection will clear 1-2 weeks, but it is not uncommon for post viral coughs to last for several weeks, even rarely the entire winter.               Follow-ups after your visit        Who to contact     If you have questions or need follow up information about today's clinic visit or your schedule please contact [facility] directly at [phone].  Normal or non-critical lab and imaging results will be communicated to you by Igglihart, letter or phone within 4 business days after the clinic has received the results. If you do not hear from us within 7 days, please contact the clinic through Igglihart or phone. If you have a critical or abnormal lab result, we will notify you by phone as soon as possible.  Submit refill requests through Terracotta or call your pharmacy and they will forward the refill request to us. Please allow 3 business days for your refill to be completed.          Additional Information About Your Visit        Terracotta Information     Terracotta gives you secure access to your electronic  health record. If you see a primary care provider, you can also send messages to your care team and make appointments. If you have questions, please call your primary care clinic.  If you do not have a primary care provider, please call [phone] and they will assist you.        Care EveryWhere ID     This is your Care EveryWhere ID. This could be used by other organizations to access your [facility] medical records  [id number]        Your Vitals Were     Pulse Temperature Pulse Oximetry BMI (Body Mass Index)          87 98.8  F (37.1  C) (Oral) 98% 36.72 kg/m2         Blood Pressure from Last 3 Encounters:   02/27/17 124/82   02/15/17 162/68   02/02/16 130/82    Weight from Last 3 Encounters:   02/27/17 255 lb 14.4 oz (116.1 kg)   02/15/17 256 lb 14.4 oz (116.5 kg)   02/02/16 273 lb (123.8 kg)              We Performed the Following     Beta strep group A culture     Strep, Rapid Screen          Today's Medication Changes          These changes are accurate as of: 2/27/17 12:22 PM.  If you have any questions, ask your nurse or doctor.               Start taking these medicines.        Dose/Directions    azithromycin 250 MG tablet   Commonly known as:  ZITHROMAX   Used for:  Acute pharyngitis, unspecified etiology   Started by:  Shayne Suggs MD        Two tablets first day, then one tablet daily for four days.   Quantity:  6 tablet   Refills:  0       guaiFENesin-codeine 100-10 MG/5ML Soln solution   Commonly known as:  ROBITUSSIN AC   Used for:  Bronchospasm, Viral URI with cough   Started by:  Shayne Suggs MD        Dose:  1-2 tsp.   Take 5-10 mLs by mouth every 6 hours as needed (FOR SEVERE COUGHING ONLY)   Quantity:  180 mL   Refills:  0       predniSONE 20 MG tablet   Commonly known as:  DELTASONE   Used for:  Viral URI with cough   Started by:  Shayne Suggs MD        Dose:  40 mg   Take 2 tablets (40 mg) by mouth daily for 5 days   Quantity:  10 tablet   Refills:  0             Where to get your medicines      These medications were sent to [facility] - [place], [place] - [address] AT [facility] [address], [facility] [address]    Hours:  24-hours Phone:  [phone]     azithromycin 250 MG tablet    predniSONE 20 MG tablet         Some of these will need a paper prescription and others can be bought over the counter.  Ask your nurse if you have questions.     Bring a paper prescription for each of these medications     guaiFENesin-codeine 100-10 MG/5ML Soln solution                Primary Care Provider Office Phone # Fax #    Shayne Suggs [phone]       [facility] [address]  [facility] [address]        Thank you!     Thank you for choosing [facility]  for your care. Our goal is always to provide you with excellent care. Hearing back from our patients is one way we can continue to improve our services. Please take a few minutes to complete the written survey that you may receive in the mail after your visit with us. Thank you!             Your Updated Medication List - Protect others around you: Learn how to safely use, store and throw away your medicines at www.disposemymeds.org.          This list is accurate as of: 2/27/17 12:22 PM.  Always use your most recent med list.                   Brand Name Dispense Instructions for use    albuterol 108 (90 BASE) MCG/ACT Inhaler    PROAIR HFA/PROVENTIL HFA/VENTOLIN HFA    1 Inhaler    Inhale 2 puffs into the lungs every 6 hours as needed for shortness of breath / dyspnea or wheezing       aspirin 81 MG tablet      ONE DAILY       azithromycin 250 MG tablet    ZITHROMAX    6 tablet    Two tablets first day, then one tablet daily for four days.       guaiFENesin-codeine 100-10 MG/5ML Soln solution    ROBITUSSIN AC    180 mL    Take 5-10 mLs by mouth every 6 hours as needed (FOR SEVERE COUGHING ONLY)       ibuprofen 200 MG  tablet    ADVIL/MOTRIN     Take 800 mg by mouth 3 times daily as needed.       lisinopril 40 MG tablet    PRINIVIL/ZESTRIL    90 tablet    Take 1 tablet (40 mg) by mouth daily       multivitamin per tablet          omeprazole 20 MG CR capsule    priLOSEC     Take 1 capsule by mouth daily.       pravastatin 40 MG tablet    PRAVACHOL    90 tablet    Take 1 tablet (40 mg) by mouth At Bedtime       predniSONE 20 MG tablet    DELTASONE    10 tablet    Take 2 tablets (40 mg) by mouth daily for 5 days       zolpidem 10 MG tablet    AMBIEN    30 tablet    Take 0.5-1 tablets (5-10 mg) by mouth nightly as needed for sleep

## 2017-02-27 NOTE — PATIENT INSTRUCTIONS
"Post infectious Bronchospasm (wheezing):  ===============================================      *  Bronchospasm is irritation of the airways that causes them to spasm and temporarily \"narrow\" which causes coughing (usually minimal sputum production), shortness of breath, dyspnea on exertion, wheezing.  Your airways will be more reactive to things such as temperature changes (too cold, too hot, too humid, etc.), activity, pollutants such as dander, smoke, air pollution, etc.  This will get better with time, but will produce lingering symptoms as described above for a couple weeks to a couple of months.     *  Antibiotics possibly indicated:  Azithromycin., 2 tablet today, then one tablet per day for 4 days, (5 day total course)    *  Due to the degree of inflammation inside the airways, take Prednisone 40 mg once per day for 5 days.      *  *  For severe coughing, OK to use Rpbitussin with codeine cough syrup, take 1 or 2 teaspoons (5-10 ml) every 4-6 hours as needed for severe coughing.  This is a powerful cough suppressant.  Beware of drowsiness, nausea, vomiting, when taking this medication.  Do not drive, or operate dangerous equipment after taking this.       *  Albuterol inhaler, use 2 puffs, 3-5 times per day as needed for any coughing, wheezing, tightness in the breathing, or shortness of breath.  This inhaled medication helps reduce the inflammation and spasm of the airways which will help the breathing become easier.  This is a similar medication we use to treat asthma, but you do not have asthma.      Consider using this inhaler before any known triggers for our coughing or wheezing (usually these include cold or hot temperatures, humidity, dust, air pollutants, smoke).      *  Expect that your airways may remain more easily irritated for a few weeks after upper respiratory infections.     If you require the albuterol rescue inhaler on a regular basis more than a few times per week, you may need additional " medications to help control the breathing better.    These are the available forms of Albuterol, your insurance formulary will determine which one is preferred.  They all work the same.                 *  Cough suppressant Delsym, follow directions on bottle    *  Mucinex extended release, one or two tablets twice per day for the next 5-7 days.  This helps loosen the secretions to they can be passed more easily out of the body.     *  Be sure to drink lots of fluids.  If the appetite and intake of food is way down, then at leat try to eat soup.  Dehydration is the thing that causes people to end up in the hospital with viral infections and the flu.     *  For sore throat:  Try lozenges (Cepostat, Cepacol, hard candies, Zinc lozenges, etc)    *  If you have thick secretions:  Mucinex extended release twice per day on a regular basis for the next few days, then twice per day as needed.  OK to take the regular Mucinex, you may just have to take it 2-3 times per day.      *  If you have dry nasal passages or frequent bloody noses during the winter time:  Saline nasal spray as often as needed for dry nose.     *  If you have a lot of congestion and/or runny noses:  OK to try decongestants as needed (e.g. pseudoephedrine or phenylephrine).  Be sure to take the lowest dose needed.  Take decongestants from only ONE source.  It is possible to inadvertantly take more than the recommended amounts if you take decongestants from multiple products (i.e. Do NOT take Mucinex-D and Claritin-D together)    *  If you have been told to NOT take decongestants or if you cannot tolerate decongestants due to effect on blood pressure, sleep or heart rate:  Try Coricidin HBP or Chlortrimeton (chlorpheniramine).  These can have a similar effect as some decongestants but will not affect your heart rate or blood pressure.      *  If you have SEVERE nasal congestion:  Affrin nasal spray as needed for severe nasal congestion (especially before  the airplane trip), but do not use for more than one week.      *  Tylenol as needed for any headaches of low grade temperatures.     *  Ibuprofen as needed for body aches, fevers, headaches    *  Call the clinic if any changes in the symptoms such as worsening fevers, or the amount and quality of the sputum changes, or if you have any major difficulties breathing, or the symptoms fail to clear for a few weeks.    *  Most upper respiratory infection will clear 1-2 weeks, but it is not uncommon for post viral coughs to last for several weeks, even rarely the entire winter.

## 2017-02-27 NOTE — PROGRESS NOTES
SUBJECTIVE:                                                    bA Aguilar is a 56 year old male who presents to clinic today for the following health issues:      Concern - Cough     Onset: 3 weeks    Description:   Pt main complaint is lingering cough. Sometimes productive w/ yellow phlegm, fever, soreness in chest from coughing    Intensity: severe    Progression of Symptoms:  worsening    Accompanying Signs & Symptoms:  had fever over the weekend 102-103, fever broke last night       Previous history of similar problem:   yes    Precipitating factors:   Worsened by:     Alleviating factors:  Improved by: nebulizer        Therapies Tried and outcome: nebulizer with some relief, tylenol for fever w/ relief, OTC flu medicine w/ out relief          Problem list and histories reviewed & adjusted, as indicated.  Additional history: as documented        Past Medical History:  ---------------------------  Past Medical History   Diagnosis Date     Esophageal reflux      Essential hypertension, benign      Major depression      Obesity      Other and unspecified hyperlipidemia      Prediabetes 9/26/12     A1C 6.1       Past Surgical History:  ---------------------------  Past Surgical History   Procedure Laterality Date     C nonspecific procedure  1991     R ankle fracture, repair     C nonspecific procedure  age 6     B inguinal hernia repair     C nonspecific procedure  9/01     lumbar laminectomy     Stress echo ([facility])  10/02     normal      Stress echo ([facility])  9/05     normal stress echo       Current Medications:  ---------------------------  Current Outpatient Prescriptions   Medication Sig Dispense Refill     guaiFENesin-codeine (ROBITUSSIN AC) 100-10 MG/5ML SOLN solution Take 5-10 mLs by mouth every 6 hours as needed (FOR SEVERE COUGHING ONLY) 180 mL 0     zolpidem (AMBIEN) 10 MG tablet Take 0.5-1 tablets (5-10 mg) by mouth nightly as needed for sleep 30 tablet 1     lisinopril (PRINIVIL/ZESTRIL) 40 MG  tablet Take 1 tablet (40 mg) by mouth daily 90 tablet 3     pravastatin (PRAVACHOL) 40 MG tablet Take 1 tablet (40 mg) by mouth At Bedtime 90 tablet 3     albuterol (PROAIR HFA/PROVENTIL HFA/VENTOLIN HFA) 108 (90 BASE) MCG/ACT Inhaler Inhale 2 puffs into the lungs every 6 hours as needed for shortness of breath / dyspnea or wheezing 1 Inhaler 2     omeprazole (PRILOSEC) 20 MG capsule Take 1 capsule by mouth daily.       ibuprofen (ADVIL,MOTRIN) 200 MG tablet Take 800 mg by mouth 3 times daily as needed.       ASPIRIN 81 MG OR TABS ONE DAILY       MULTIVITAMINS OR TABS          Allergies:  -------------  Allergies   Allergen Reactions     Atorvastatin GI Disturbance     Abdominal and intestinal pains from atorvastatin       Social History:  -------------------  Social History     Social History     Marital status:      Spouse name: N/A     Number of children: N/A     Years of education: N/A     Occupational History     Not on file.     Social History Main Topics     Smoking status: Never Smoker     Smokeless tobacco: Never Used     Alcohol use Yes      Comment: 12-18 q week BEER     Drug use: No     Sexual activity: Yes     Partners: Female     Other Topics Concern     Caffeine Concern No     Social History Narrative       Family Medical History:  ------------------------------  Family History   Problem Relation Age of Onset     Hypertension Father      HEART DISEASE Brother 37     b. 1967, 1st MI at age 37     Family History Negative Mother      Family History Negative Brother      b. 1965     Family History Negative Sister      b. 1961         ROS:  REVIEW OF SYSTEMS:    RESP: positive for cough, dyspnea, wheezing; negative for hemoptysis   CV: negative for chest pain, palpitations, PND, orthopnea;  GI: negative for dysphagia, N/V, pain, melena, diarrhea and constipation  NEURO: negative for numbness/tingling, paralysis, incoordination, or focal weakness     OBJECTIVE:                                                     /82  Pulse 87  Temp 98.8  F (37.1  C) (Oral)  Wt 255 lb 14.4 oz (116.1 kg)  SpO2 98%  BMI 36.72 kg/m2     GENERAL alert and no distress  EYES:  Normal sclera,conjunctiva, EOMI  HENT: oral and posterior pharynx without lesions or erythema, facies symmetric  NECK: Neck supple. No LAD, without thyroidmegaly or JVD., Carotids without bruits.  RESP: Clear to ausculation bilaterally without wheezes or crackles. Normal BS in all fields.  CV: RRR normal S1S2 without murmurs, rubs or gallops. PMI normal  LYMPH: no cervical lymph adenopathy appreciated  MS: extremities- no gross deformities of the visible extremities noted, no edema  PSYCH: Alert and oriented times 3; speech- coherent  SKIN:  No obvious significant skin lesions on visible portions of face     Results for orders placed or performed in visit on 02/27/17   Strep, Rapid Screen   Result Value Ref Range    Specimen Description Throat     Rapid Strep A Screen       NEGATIVE: No Group A streptococcal antigen detected by immunoassay, await   culture report.      Micro Report Status FINAL 02/27/2017    Beta strep group A culture   Result Value Ref Range    Specimen Description Throat     Culture Micro No Beta Streptococcus isolated     Micro Report Status FINAL 03/01/2017          ASSESSMENT/PLAN:                                                        (J98.01) Bronchospasm  (primary encounter diagnosis)  Comment: Pt appears to be having bronchospasm.  discussed issues of bronchial disease/bronchospasm.  Recommended symptomatic treatment with bronchodilator (albuterol) as needed.  Continue to treat any congestion as needed with decongestants, any allergic components with nonsedating antihistamine.  If sx. recur or worsen, may need more chronic/regular medication such as Advair or similar.   Plan: guaiFENesin-codeine (ROBITUSSIN AC) 100-10         MG/5ML SOLN solution            (J06.9,  B97.89) Viral URI with cough  Comment:   Plan: predniSONE  (DELTASONE) 20 MG tablet,         guaiFENesin-codeine (ROBITUSSIN AC) 100-10         MG/5ML SOLN solution    *  Antibiotics possibly indicated:  Azithromycin., 2 tablet today, then one tablet per day for 4 days, (5 day total course)    *  Due to the degree of inflammation inside the airways, take Prednisone 40 mg once per day for 5 days.      *  *  For severe coughing, OK to use Rpbitussin with codeine cough syrup, take 1 or 2 teaspoons (5-10 ml) every 4-6 hours as needed for severe coughing.  This is a powerful cough suppressant.  Beware of drowsiness, nausea, vomiting, when taking this medication.  Do not drive, or operate dangerous equipment after taking this.       *  Albuterol inhaler, use 2 puffs, 3-5 times per day as needed for any coughing, wheezing, tightness in the breathing, or shortness of breath.  This inhaled medication helps reduce the inflammation and spasm of the airways which will help the breathing become easier.  This is a similar medication we use to treat asthma, but you do not have asthma.      Consider using this inhaler before any known triggers for our coughing or wheezing (usually these include cold or hot temperatures, humidity, dust, air pollutants, smoke).                  (F32.5) Major depressive disorder with single episode, in full remission (H)  Comment: doing well, no medication required.   Plan:     (J02.9) Acute pharyngitis, unspecified etiology  Comment:   Plan: azithromycin (ZITHROMAX) 250 MG tablet            (R07.0) Throat pain  Comment:   Plan: Strep, Rapid Screen, Beta strep group A culture             (J06.9,  B97.89) Viral URI with cough  Comment:   Plan: predniSONE (DELTASONE) 20 MG tablet,         guaiFENesin-codeine (ROBITUSSIN AC) 100-10         MG/5ML SOLN solution            (J02.9) Acute pharyngitis, unspecified etiology  Comment:   Plan: azithromycin (ZITHROMAX) 250 MG tablet              See Patient Instructions    FRANK BUTLER M.D., MD  [facility]  [place]

## 2017-02-27 NOTE — LETTER
[facility]  [address]  [address]  [phone]          Ab Aguilar  [address]  [facility] [address]      2/27/2017        Dear Mr. Ab Aguilar:    I am writing on behalf of Ab Aguilar who is suffering from an acute upper respiratory infection since Friday February 24, 2017.  He was seen today for follow up and is recovering, but is still slightly ill and should not return to work until Wednesday March 1, 2017.  He may return to work at that time without restrictions.   If you have any further questions or problems, please contact me via our nurse line at [phone].    Sincerely,          Shayne Suggs M.D.  Department of Internal Medicine  [facility]

## 2017-03-01 LAB
BACTERIA SPEC CULT: NORMAL
MICRO REPORT STATUS: NORMAL
SPECIMEN SOURCE: NORMAL

## 2017-03-07 ASSESSMENT — PATIENT HEALTH QUESTIONNAIRE - PHQ9: SUM OF ALL RESPONSES TO PHQ QUESTIONS 1-9: 2

## 2017-04-10 DIAGNOSIS — F51.01 PRIMARY INSOMNIA: ICD-10-CM

## 2017-04-11 RX ORDER — ZOLPIDEM TARTRATE 10 MG/1
TABLET ORAL
Qty: 30 TABLET | Refills: 1 | Status: SHIPPED | OUTPATIENT
Start: 2017-04-11 | End: 2017-06-13

## 2017-04-11 NOTE — TELEPHONE ENCOUNTER
Ambien      Last Written Prescription Date:  2/15/17  Last Fill Quantity: 30,   # refills: 0  Last Office Visit with [facility] prescribing provider: 2/27/17  Future Office visit:       Routing refill request to provider for review/approval because:  Drug not on the [facility] refill protocol or controlled substance

## 2017-05-09 ENCOUNTER — OFFICE VISIT (OUTPATIENT)
Dept: INTERNAL MEDICINE | Facility: CLINIC | Age: 57
End: 2017-05-09
Payer: COMMERCIAL

## 2017-05-09 VITALS
WEIGHT: 263.6 LBS | SYSTOLIC BLOOD PRESSURE: 140 MMHG | BODY MASS INDEX: 37.82 KG/M2 | TEMPERATURE: 98.5 F | DIASTOLIC BLOOD PRESSURE: 98 MMHG | OXYGEN SATURATION: 97 % | HEART RATE: 89 BPM

## 2017-05-09 DIAGNOSIS — I10 ESSENTIAL HYPERTENSION, BENIGN: ICD-10-CM

## 2017-05-09 DIAGNOSIS — K21.00 GASTROESOPHAGEAL REFLUX DISEASE WITH ESOPHAGITIS: Primary | ICD-10-CM

## 2017-05-09 PROCEDURE — 99214 OFFICE O/P EST MOD 30 MIN: CPT | Performed by: INTERNAL MEDICINE

## 2017-05-09 RX ORDER — SUCRALFATE 1 G/1
1 TABLET ORAL 4 TIMES DAILY PRN
Qty: 100 TABLET | Refills: 1 | Status: SHIPPED | OUTPATIENT
Start: 2017-05-09 | End: 2017-08-12

## 2017-05-09 NOTE — PATIENT INSTRUCTIONS
*  Upper endoscopy to evaluate the stomach and esophagus    *  You should be contacted by [facility] [phone] (fax [phone])  To arrange this procedure    *  Stop any Aspirin ( even the low dose 81 mg tablet) and stop NSAIDs within 7 days of the upper endoscopy in case they need to take a biopsy.     *  Carafate tablet, 1 tablet four times per day as needed, take 30 minutes before eating and at bedtime AS NEEDED.  This will help soothew the irriated esophagus tissues.     *  Continue all medications at the same doses.  Contact your usual pharmacy if you need refills.

## 2017-05-09 NOTE — MR AVS SNAPSHOT
After Visit Summary   5/9/2017    Ab Aguilar    MRN: [id number]           Patient Information     Date Of Birth          1960        Visit Information        Provider Department      5/9/2017 1:40 PM Shayne Suggs MD [facility]        Today's Diagnoses     Gastroesophageal reflux disease with esophagitis    -  1    Essential hypertension, benign          Care Instructions    *  Upper endoscopy to evaluate the stomach and esophagus    *  You should be contacted by [facility] [phone] (fax [phone])  To arrange this procedure    *  Stop any Aspirin ( even the low dose 81 mg tablet) and stop NSAIDs within 7 days of the upper endoscopy in case they need to take a biopsy.     *  Carafate tablet, 1 tablet four times per day as needed, take 30 minutes before eating and at bedtime AS NEEDED.  This will help soothew the irriated esophagus tissues.     *  Continue all medications at the same doses.  Contact your usual pharmacy if you need refills.           Follow-ups after your visit        Additional Services     GASTROENTEROLOGY ADULT REF PROCEDURE ONLY       [facility] [phone] (fax [phone])      Last Lab Result: Creatinine (mg/dL)       Date                     Value                 02/14/2017               0.70             ----------  Body mass index is 37.82 kg/(m^2).     Needed:  No  Language:  English    Patient will be contacted to schedule procedure.     Please be aware that coverage of these services is subject to the terms and limitations of your health insurance plan.  Call member services at your health plan with any benefit or coverage questions.  Any procedures must be performed at a [facility] OR coordinated by your clinic's referral office.    Please bring the following with you to your appointment:    (1) Any X-Rays, CTs or MRIs which have been performed.  Contact the facility  where they were done to arrange for  prior to your scheduled appointment.    (2) List of current medications   (3) This referral request   (4) Any documents/labs given to you for this referral                  Who to contact     If you have questions or need follow up information about today's clinic visit or your schedule please contact [facility] directly at [phone].  Normal or non-critical lab and imaging results will be communicated to you by [employer], letter or phone within 4 business days after the clinic has received the results. If you do not hear from us within 7 days, please contact the clinic through [employer] or phone. If you have a critical or abnormal lab result, we will notify you by phone as soon as possible.  Submit refill requests through [employer] or call your pharmacy and they will forward the refill request to us. Please allow 3 business days for your refill to be completed.          Additional Information About Your Visit        Reebeehart Information     [employer] gives you secure access to your electronic health record. If you see a primary care provider, you can also send messages to your care team and make appointments. If you have questions, please call your primary care clinic.  If you do not have a primary care provider, please call [phone] and they will assist you.        Care EveryWhere ID     This is your Care EveryWhere ID. This could be used by other organizations to access your [place] medical records  [id number]        Your Vitals Were     Pulse Temperature Pulse Oximetry BMI (Body Mass Index)          89 98.5  F (36.9  C) (Oral) 97% 37.82 kg/m2         Blood Pressure from Last 3 Encounters:   05/09/17 (!) 140/98   02/27/17 124/82   02/15/17 162/68    Weight from Last 3 Encounters:   05/09/17 263 lb 9.6 oz (119.6 kg)   02/27/17 255 lb 14.4 oz (116.1 kg)   02/15/17 256 lb 14.4 oz (116.5 kg)              We Performed the Following      GASTROENTEROLOGY ADULT REF PROCEDURE ONLY          Today's Medication Changes          These changes are accurate as of: 5/9/17  2:28 PM.  If you have any questions, ask your nurse or doctor.               Start taking these medicines.        Dose/Directions    sucralfate 1 GM tablet   Commonly known as:  CARAFATE   Used for:  Gastroesophageal reflux disease with esophagitis   Started by:  Shayne Suggs MD        Dose:  1 g   Take 1 tablet (1 g) by mouth 4 times daily as needed   Quantity:  100 tablet   Refills:  1            Where to get your medicines      These medications were sent to [facility] [id number] [place], [place] [address] AT [facility] & [address] [place], [place] [address] [address], [facility] [address]    Hours:  24-hours Phone:  [phone]     sucralfate 1 GM tablet                Primary Care Provider Office Phone # Fax #    Shayne Suggs [phone]       [facility] [address]  [facility] [address]        Thank you!     Thank you for choosing [facility]  for your care. Our goal is always to provide you with excellent care. Hearing back from our patients is one way we can continue to improve our services. Please take a few minutes to complete the written survey that you may receive in the mail after your visit with us. Thank you!             Your Updated Medication List - Protect others around you: Learn how to safely use, store and throw away your medicines at www.disposemymeds.org.          This list is accurate as of: 5/9/17  2:28 PM.  Always use your most recent med list.                   Brand Name Dispense Instructions for use    aspirin 81 MG tablet      ONE DAILY       ibuprofen 200 MG tablet    ADVIL/MOTRIN     Take 800 mg by mouth 3 times daily as needed.       lisinopril 40 MG tablet    PRINIVIL/ZESTRIL    90 tablet    Take 1 tablet (40 mg) by mouth daily       multivitamin per tablet          omeprazole  20 MG CR capsule    priLOSEC     Take 1 capsule by mouth daily.       pravastatin 40 MG tablet    PRAVACHOL    90 tablet    Take 1 tablet (40 mg) by mouth At Bedtime       sucralfate 1 GM tablet    CARAFATE    100 tablet    Take 1 tablet (1 g) by mouth 4 times daily as needed       zolpidem 10 MG tablet    AMBIEN    30 tablet    TAKE 1/2 TO 1 TABLET BY MOUTH AT NIGHT AS NEEDED FOR SLEEP

## 2017-05-09 NOTE — PROGRESS NOTES
SUBJECTIVE:                                                    Ab Aguilar is a 56 year old male who presents to clinic today for the following health issues:      Concern - Metallic taste in Mouth     Onset: 2/2017    Description:   Metallic taste in mouth ever since URI in late feb.    Intensity: mild, moderate    Progression of Symptoms:  constant    Accompanying Signs & Symptoms:  In the past 2-3 weeks, pt has also been experiencing burning sensation deep down in throat.       Previous history of similar problem:   no    Precipitating factors:   Worsened by: no particular food or drink    Alleviating factors:  Improved by: nothing    Started taking Omeprazole few weeks ago, has not made any difference.   No dysphagia,m no nausea or vomiting.   No new foods.   No speech problems.      Therapies Tried and outcome: nothing          Problem list and histories reviewed & adjusted, as indicated.  Additional history: as documented        Reviewed and updated as needed this visit by clinical staff  Tobacco  Allergies       Reviewed and updated as needed this visit by Provider           Past Medical History:  ---------------------------  Past Medical History:   Diagnosis Date     Esophageal reflux      Essential hypertension, benign      Major depression      Obesity      Other and unspecified hyperlipidemia      Prediabetes 9/26/12    A1C 6.1       Past Surgical History:  ---------------------------  Past Surgical History:   Procedure Laterality Date     C NONSPECIFIC PROCEDURE  1991    R ankle fracture, repair     C NONSPECIFIC PROCEDURE  age 6    B inguinal hernia repair     C NONSPECIFIC PROCEDURE  9/01    lumbar laminectomy     STRESS ECHO ([facility])  10/02    normal      STRESS ECHO ([facility])  9/05    normal stress echo       Current Medications:  ---------------------------  Current Outpatient Prescriptions   Medication Sig Dispense Refill     zolpidem (AMBIEN) 10 MG tablet TAKE 1/2 TO 1 TABLET BY MOUTH AT  NIGHT AS NEEDED FOR SLEEP 30 tablet 1     lisinopril (PRINIVIL/ZESTRIL) 40 MG tablet Take 1 tablet (40 mg) by mouth daily 90 tablet 3     pravastatin (PRAVACHOL) 40 MG tablet Take 1 tablet (40 mg) by mouth At Bedtime 90 tablet 3     omeprazole (PRILOSEC) 20 MG capsule Take 1 capsule by mouth daily.       ibuprofen (ADVIL,MOTRIN) 200 MG tablet Take 800 mg by mouth 3 times daily as needed.       ASPIRIN 81 MG OR TABS ONE DAILY       MULTIVITAMINS OR TABS          Allergies:  -------------  Allergies   Allergen Reactions     Atorvastatin GI Disturbance     Abdominal and intestinal pains from atorvastatin       Social History:  -------------------  Social History     Social History     Marital status:      Spouse name: N/A     Number of children: N/A     Years of education: N/A     Occupational History     Not on file.     Social History Main Topics     Smoking status: Never Smoker     Smokeless tobacco: Never Used     Alcohol use Yes      Comment: 12-18 q week BEER     Drug use: No     Sexual activity: Yes     Partners: Female     Other Topics Concern     Caffeine Concern No     Social History Narrative       Family Medical History:  ------------------------------  Family History   Problem Relation Age of Onset     Hypertension Father      HEART DISEASE Brother 37     b. 1967, 1st MI at age 37     Family History Negative Mother      Family History Negative Brother      b. 1965     Family History Negative Sister      b. 1961         ROS:  REVIEW OF SYSTEMS:    RESP: negative for cough, dyspnea, wheezing, hemoptysis  CV: negative for chest pain, palpitations, PND, MOSCOSO, orthopnea; reports no changes in their ability to perform physical activity (from cardiovascular standpoint)  GI: negative for  N/V, pain, melena, diarrhea and constipation; POS for GERD sx, and mild occ dysphagia  NEURO: negative for numbness/tingling, paralysis, incoordination, or focal weakness     OBJECTIVE:                                                     BP (!) 140/98  Pulse 89  Temp 98.5  F (36.9  C) (Oral)  Wt 263 lb 9.6 oz (119.6 kg)  SpO2 97%  BMI 37.82 kg/m2     GENERAL alert and no distress.  EYES conjunctivae/corneas clear. PERRL, EOM's intact  HENT: NCAT,oral and posterior pharynx without lesions or erythema, facies symmetric  NECK: Neck supple. No LAD, without thyroidmegaly or JVD.  RESP: Clear to ausculation bilaterally without wheezes or crackles. Normal BS in all fields.  CV: RRR normal S1S2 without  murmurs, rubs or gallops. PMI normal  LYMPH: no cervical lymph adenopathy appreciated  GI: NTND, no organomegaly, normal BS in all quadrants, without rebound or guarding  MS: No cyanosis, clubbing or edema noted bilaterally in Upper and/or Lower Extremities  SKIN: no significant ulcers, lesions or rashes on the visualized portions of the skin  NEURO: Alert and Oriented x 3, Gait normal. Reflexes normal and symmetric. Sensation grossly WNL..  PSYCH: Alert and oriented times 3; speech- coherent , normal rate and volume; able to articulate logical thoughts, able to abstract reason, no tangential thoughts, no hallucinations or delusions, affect- normal          ASSESSMENT/PLAN:                                                      (K21.0) Gastroesophageal reflux disease with esophagitis  (primary encounter diagnosis)  Comment: Discussed the functional nature of this condition regarding what they eat, how they eat, when they eat, and how much they eat as all contributing to gastroesophageal symptoms.    Recommend anti-reflux diet and eating patterns emphasizing smaller more frequent meals and timing relative to bedtime.  Also recommend avoiding tomatoes, onions, spicy foods, caffeine, or any other food/beverage that cause increased reflux.    If symptoms not controlled on current therapies, then need to return for further evaluation and consideration of further studies.     Due to the sevrity of his symptos, Kaylee recommend EGD  He is also  worried about possible sx representing esophaglea CA due to his wife having had esophageal cancer, s/p esophagectomy  Plan: sucralfate (CARAFATE) 1 GM tablet,         GASTROENTEROLOGY ADULT REF PROCEDURE ONLY            (I10) Essential hypertension, benign  Comment: elevated today due to acute illness and stress.   Home BP readings are generally always under 140/90  conintue same meds, we will change if needed.   Plan:     *  Upper endoscopy to evaluate the stomach and esophagus    *  You should be contacted by [facility] [phone] (fax [phone])  To arrange this procedure    *  Stop any Aspirin ( even the low dose 81 mg tablet) and stop NSAIDs within 7 days of the upper endoscopy in case they need to take a biopsy.     *  Carafate tablet, 1 tablet four times per day as needed, take 30 minutes before eating and at bedtime AS NEEDED.  This will help soothew the irriated esophagus tissues.     *  Continue all medications at the same doses.  Contact your usual pharmacy if you need refills.                See Patient Instructions    FRANK BUTLER M.D., MD  [facility]

## 2017-05-09 NOTE — NURSING NOTE
"Chief Complaint   Patient presents with     Throat Problem     metalic taste in throat and burning in throat since last illness 2/2017       Initial BP (!) 140/98  Pulse 89  Temp 98.5  F (36.9  C) (Oral)  Wt 263 lb 9.6 oz (119.6 kg)  SpO2 97%  BMI 37.82 kg/m2 Estimated body mass index is 37.82 kg/(m^2) as calculated from the following:    Height as of 2/15/17: 5' 10\" (1.778 m).    Weight as of this encounter: 263 lb 9.6 oz (119.6 kg).  Medication Reconciliation: complete   Lois Harley CMA      "

## 2017-05-22 ENCOUNTER — TRANSFERRED RECORDS (OUTPATIENT)
Dept: HEALTH INFORMATION MANAGEMENT | Facility: CLINIC | Age: 57
End: 2017-05-22

## 2017-06-13 DIAGNOSIS — F51.01 PRIMARY INSOMNIA: ICD-10-CM

## 2017-06-13 RX ORDER — ZOLPIDEM TARTRATE 10 MG/1
TABLET ORAL
Qty: 30 TABLET | Refills: 0 | Status: SHIPPED | OUTPATIENT
Start: 2017-06-13 | End: 2017-07-13

## 2017-06-13 NOTE — TELEPHONE ENCOUNTER
ambien      Last Written Prescription Date:  4/11/17  Last Fill Quantity: 30,   # refills: 1  Last Office Visit with [facility] prescribing provider: 5/9/17  Future Office visit:       Routing refill request to provider for review/approval because:  Drug not on the [facility] refill protocol or controlled substance

## 2017-06-28 ENCOUNTER — OFFICE VISIT (OUTPATIENT)
Dept: INTERNAL MEDICINE | Facility: CLINIC | Age: 57
End: 2017-06-28
Payer: COMMERCIAL

## 2017-06-28 ENCOUNTER — RADIANT APPOINTMENT (OUTPATIENT)
Dept: GENERAL RADIOLOGY | Facility: CLINIC | Age: 57
End: 2017-06-28
Attending: INTERNAL MEDICINE
Payer: COMMERCIAL

## 2017-06-28 VITALS
SYSTOLIC BLOOD PRESSURE: 170 MMHG | DIASTOLIC BLOOD PRESSURE: 100 MMHG | OXYGEN SATURATION: 97 % | WEIGHT: 255.9 LBS | TEMPERATURE: 98.2 F | BODY MASS INDEX: 36.63 KG/M2 | HEIGHT: 70 IN | HEART RATE: 95 BPM

## 2017-06-28 DIAGNOSIS — M25.561 CHRONIC PAIN OF RIGHT KNEE: ICD-10-CM

## 2017-06-28 DIAGNOSIS — M25.562 CHRONIC PAIN OF LEFT KNEE: ICD-10-CM

## 2017-06-28 DIAGNOSIS — K21.9 GASTROESOPHAGEAL REFLUX DISEASE WITHOUT ESOPHAGITIS: ICD-10-CM

## 2017-06-28 DIAGNOSIS — G89.29 CHRONIC PAIN OF RIGHT KNEE: ICD-10-CM

## 2017-06-28 DIAGNOSIS — G89.29 CHRONIC PAIN OF LEFT KNEE: ICD-10-CM

## 2017-06-28 DIAGNOSIS — M17.0 PRIMARY OSTEOARTHRITIS OF BOTH KNEES: Primary | ICD-10-CM

## 2017-06-28 PROCEDURE — 99213 OFFICE O/P EST LOW 20 MIN: CPT | Performed by: INTERNAL MEDICINE

## 2017-06-28 PROCEDURE — 73560 X-RAY EXAM OF KNEE 1 OR 2: CPT | Mod: LT

## 2017-06-28 PROCEDURE — 73560 X-RAY EXAM OF KNEE 1 OR 2: CPT | Mod: RT

## 2017-06-28 NOTE — NURSING NOTE
"Chief Complaint   Patient presents with     Musculoskeletal Problem     both knee pain-R knee x2 yrs L x6 months        Initial BP (!) 170/100 (BP Location: Left arm, Patient Position: Chair, Cuff Size: Adult Large)  Pulse 95  Temp 98.2  F (36.8  C) (Oral)  Ht 5' 10\" (1.778 m)  Wt 255 lb 14.4 oz (116.1 kg)  SpO2 97%  BMI 36.72 kg/m2 Estimated body mass index is 36.72 kg/(m^2) as calculated from the following:    Height as of this encounter: 5' 10\" (1.778 m).    Weight as of this encounter: 255 lb 14.4 oz (116.1 kg).  Medication Reconciliation: complete    "

## 2017-06-28 NOTE — PROGRESS NOTES
SUBJECTIVE:                                                    Ab Aguilar is a 56 year old male who presents to clinic today for the following health issues:      Joint Pain    Onset: x2 yrs-R and L x6 months     Description:   Location: L and R knee   Character: Sharp, Stabbing and constant    Intensity: At the end of the day it is severe and at the beginning it is moderate     Progression of Symptoms: worse    Accompanying Signs & Symptoms:  Other symptoms: redness    History:   Previous similar pain: no       Precipitating factors:   Trauma or overuse: no     Alleviating factors:  Improved by: ice    Therapies Tried and outcome: ice--gives a little relief ibuprofen-gives a little relief              Problem list and histories reviewed & adjusted, as indicated.  Additional history: as documented        Reviewed and updated as needed this visit by clinical staff  Tobacco  Allergies       Reviewed and updated as needed this visit by Provider           Past Medical History:  ---------------------------  Past Medical History:   Diagnosis Date     Esophageal reflux      Essential hypertension, benign      Major depression      Obesity      Other and unspecified hyperlipidemia      Prediabetes 9/26/12    A1C 6.1       Past Surgical History:  ---------------------------  Past Surgical History:   Procedure Laterality Date     C NONSPECIFIC PROCEDURE  1991    R ankle fracture, repair     C NONSPECIFIC PROCEDURE  age 6    B inguinal hernia repair     C NONSPECIFIC PROCEDURE  9/01    lumbar laminectomy     STRESS ECHO ([facility])  10/02    normal      STRESS ECHO ([facility])  9/05    normal stress echo       Current Medications:  ---------------------------  Current Outpatient Prescriptions   Medication Sig Dispense Refill     omeprazole (PRILOSEC) 20 MG CR capsule Take 2 capsules (40 mg) by mouth daily 60 capsule 11     zolpidem (AMBIEN) 10 MG tablet TAKE 1/2 OR 1 TABLET BY MOUTH AT NIGHT AS NEEDED FOR SLEEP 30 tablet 0      sucralfate (CARAFATE) 1 GM tablet Take 1 tablet (1 g) by mouth 4 times daily as needed 100 tablet 1     lisinopril (PRINIVIL/ZESTRIL) 40 MG tablet Take 1 tablet (40 mg) by mouth daily 90 tablet 3     pravastatin (PRAVACHOL) 40 MG tablet Take 1 tablet (40 mg) by mouth At Bedtime 90 tablet 3     ibuprofen (ADVIL,MOTRIN) 200 MG tablet Take 800 mg by mouth 3 times daily as needed.       MULTIVITAMINS OR TABS        ASPIRIN 81 MG OR TABS ONE DAILY         Allergies:  -------------  Allergies   Allergen Reactions     Atorvastatin GI Disturbance     Abdominal and intestinal pains from atorvastatin       Social History:  -------------------  Social History     Social History     Marital status:      Spouse name: N/A     Number of children: N/A     Years of education: N/A     Occupational History     Not on file.     Social History Main Topics     Smoking status: Never Smoker     Smokeless tobacco: Never Used     Alcohol use Yes      Comment: 12-18 q week BEER     Drug use: No     Sexual activity: Yes     Partners: Female     Other Topics Concern     Caffeine Concern No     Social History Narrative       Family Medical History:  ------------------------------  Family History   Problem Relation Age of Onset     Hypertension Father      HEART DISEASE Brother 37     b. 1967, 1st MI at age 37     Family History Negative Mother      Family History Negative Brother      b. 1965     Family History Negative Sister      b. 1961         ROS:  REVIEW OF SYSTEMS:    RESP: negative for cough, dyspnea, wheezing, hemoptysis  CV: negative for chest pain, palpitations, PND, MOSCOSO, orthopnea; reports no changes in their ability to perform physical activity (from cardiovascular standpoint)  GI: negative for dysphagia, N/V, pain, melena, diarrhea and constipation  NEURO: negative for numbness/tingling, paralysis, incoordination, or focal weakness     OBJECTIVE:                                                    BP (!) 170/100 (BP  "Location: Left arm, Patient Position: Chair, Cuff Size: Adult Large)  Pulse 95  Temp 98.2  F (36.8  C) (Oral)  Ht 5' 10\" (1.778 m)  Wt 255 lb 14.4 oz (116.1 kg)  SpO2 97%  BMI 36.72 kg/m2     GENERAL alert and no distress  EYES:  Normal sclera,conjunctiva, EOMI  HENT: oral and posterior pharynx without lesions or erythema, facies symmetric  NECK: Neck supple. No LAD, without thyroidmegaly or JVD., Carotids without bruits.  RESP: Clear to ausculation bilaterally without wheezes or crackles. Normal BS in all fields.  CV: RRR normal S1S2 without murmurs, rubs or gallops. PMI normal  LYMPH: no cervical lymph adenopathy appreciated  MS: extremities- no gross deformities of the visible extremities noted, no edema  PSYCH: Alert and oriented times 3; speech- coherent  SKIN:  No obvious significant skin lesions on visible portions of face  KNEE:  Both kness have some swelling in joint spce, some creptisu in movement  Right knee worse than left     XRAY:  My prelim read:  Advanced DJD  Right knee, mild/moderate left knee.      ASSESSMENT/PLAN:                                                      (M17.0) Primary osteoarthritis of both knees  (primary encounter diagnosis)  Comment: advnaced DJD right knee, bone on bone in medial compartment.   He admist that he has been suffering with this for some time, admits that he has beenin denila about how bad it is.   Now reports that he cannot stand it any more and would like to proceed with fixing it.   There is nothing else other than total knee arthroplasty that will help this  Refer to orthopedics for further discussion.   Plan: ORTHOPEDICS ADULT REFERRAL            (M25.561,  G89.29) Chronic pain of right knee  Comment:   Plan: XR Knee Standing Right 2 Views            *  degenerative joint disease both knees, R>>L based on xrays    *  Visit orthopedist to discuss mangement which really is only knee replacement.      *  [facility] [phone]   " [email or web address]       *  Omiditnue Omepraozle 40 mg ocne epr day.  Get either over the counter or else via prescription.       (M25.562,  G89.29) Chronic pain of left knee  Comment:   Plan: XR Knee Standing Left 2 Views            (K21.9) Gastroesophageal reflux disease without esophagitis  Comment: This condition is currently controlled on the current medical regimen.  Continue current therapy.   Plan: omeprazole (PRILOSEC) 20 MG CR capsule              See Patient Instructions    FRANK BUTLER M.D., MD  [facility]

## 2017-06-28 NOTE — MR AVS SNAPSHOT
After Visit Summary   6/28/2017    Ab Aguilar    MRN: [id number]           Patient Information     Date Of Birth          1960        Visit Information        Provider Department      6/28/2017 11:20 AM Shayne Suggs MD [facility]        Today's Diagnoses     Primary osteoarthritis of both knees    -  1    Chronic pain of right knee        Chronic pain of left knee        Gastroesophageal reflux disease without esophagitis          Care Instructions    *  degenerative joint disease both knees, R>>L based on xrays    *  Visit orthopedist to discuss mangement which really is only knee replacement.      *  [facility] [phone]   [email or web address]       *  COnitnue Omepraozle 40 mg ocne epr day.  Get either over the counter or else via prescription.           Follow-ups after your visit        Additional Services     ORTHOPEDICS ADULT REFERRAL       Your provider has referred you to:     [facility] [phone]   [email or web address]    Please be aware that coverage of these services is subject to the terms and limitations of your health insurance plan.  Call member services at your health plan with any benefit or coverage questions.      Please bring the following to your appointment:    >>   Any x-rays, CTs or MRIs which have been performed.  Contact the facility where they were done to arrange for  prior to your scheduled appointment.    >>   List of current medications   >>   This referral request   >>   Any documents/labs given to you for this referral                  Who to contact     If you have questions or need follow up information about today's clinic visit or your schedule please contact [facility] directly at [phone].  Normal or non-critical lab and imaging results will be communicated to you by MyChart, letter or phone  "within 4 business days after the clinic has received the results. If you do not hear from us within 7 days, please contact the clinic through [employer] or phone. If you have a critical or abnormal lab result, we will notify you by phone as soon as possible.  Submit refill requests through [employer] or call your pharmacy and they will forward the refill request to us. Please allow 3 business days for your refill to be completed.          Additional Information About Your Visit        [employer] Information     [employer] gives you secure access to your electronic health record. If you see a primary care provider, you can also send messages to your care team and make appointments. If you have questions, please call your primary care clinic.  If you do not have a primary care provider, please call [phone] and they will assist you.        Care EveryWhere ID     This is your Care EveryWhere ID. This could be used by other organizations to access your [place] medical records  [id number]        Your Vitals Were     Pulse Temperature Height Pulse Oximetry BMI (Body Mass Index)       95 98.2  F (36.8  C) (Oral) 5' 10\" (1.778 m) 97% 36.72 kg/m2        Blood Pressure from Last 3 Encounters:   06/28/17 (!) 170/100   05/09/17 (!) 140/98   02/27/17 124/82    Weight from Last 3 Encounters:   06/28/17 255 lb 14.4 oz (116.1 kg)   05/09/17 263 lb 9.6 oz (119.6 kg)   02/27/17 255 lb 14.4 oz (116.1 kg)              We Performed the Following     ORTHOPEDICS ADULT REFERRAL          Today's Medication Changes          These changes are accurate as of: 6/28/17 12:33 PM.  If you have any questions, ask your nurse or doctor.               These medicines have changed or have updated prescriptions.        Dose/Directions    omeprazole 20 MG CR capsule   Commonly known as:  priLOSEC   This may have changed:  how much to take   Used for:  Gastroesophageal reflux disease without esophagitis   Changed by:  Shayne Suggs MD        Dose:  " 40 mg   Take 2 capsules (40 mg) by mouth daily   Quantity:  60 capsule   Refills:  11            Where to get your medicines      Some of these will need a paper prescription and others can be bought over the counter.  Ask your nurse if you have questions.     Bring a paper prescription for each of these medications     omeprazole 20 MG CR capsule                Primary Care Provider Office Phone # Fax #    Shayne Suggs [phone]       [facility] [address] [facility] [address]        Equal Access to Services     GARRISON DEVI : Hadii aad ku hadasho Soomaali, waaxda luqadaha, qaybta kaalmada adeegyada, waxay idiin hayaan anthony dowd . So [facility] [phone].    ATENCIÓN: Si habla español, tiene a mcclure disposición servicios gratuitos de asistencia lingüística. Llame al [phone].    We comply with applicable federal civil rights laws and [place] laws. We do not discriminate on the basis of race, color, national origin, age, disability sex, sexual orientation or gender identity.            Thank you!     Thank you for choosing [facility]  for your care. Our goal is always to provide you with excellent care. Hearing back from our patients is one way we can continue to improve our services. Please take a few minutes to complete the written survey that you may receive in the mail after your visit with us. Thank you!             Your Updated Medication List - Protect others around you: Learn how to safely use, store and throw away your medicines at www.disposemymeds.org.          This list is accurate as of: 6/28/17 12:33 PM.  Always use your most recent med list.                   Brand Name Dispense Instructions for use Diagnosis    aspirin 81 MG tablet      ONE DAILY    Essential hypertension, benign, Other and unspecified hyperlipidemia       ibuprofen 200 MG tablet    ADVIL/MOTRIN     Take 800 mg by mouth 3 times daily as needed.         lisinopril 40 MG tablet    PRINIVIL/ZESTRIL    90 tablet    Take 1 tablet (40 mg) by mouth daily    Essential hypertension, benign       multivitamin per tablet           omeprazole 20 MG CR capsule    priLOSEC    60 capsule    Take 2 capsules (40 mg) by mouth daily    Gastroesophageal reflux disease without esophagitis       pravastatin 40 MG tablet    PRAVACHOL    90 tablet    Take 1 tablet (40 mg) by mouth At Bedtime    Hyperlipidemia LDL goal <130       sucralfate 1 GM tablet    CARAFATE    100 tablet    Take 1 tablet (1 g) by mouth 4 times daily as needed    Gastroesophageal reflux disease with esophagitis       zolpidem 10 MG tablet    AMBIEN    30 tablet    TAKE 1/2 OR 1 TABLET BY MOUTH AT NIGHT AS NEEDED FOR SLEEP    Primary insomnia

## 2017-06-28 NOTE — PATIENT INSTRUCTIONS
*  degenerative joint disease both knees, R>>L based on xrays    *  Visit orthopedist to discuss mangement which really is only knee replacement.      *  [facility] [phone]   [email or web address]       *  COnitnue Omepraozle 40 mg ocne epr day.  Get either over the counter or else via prescription.

## 2017-06-29 ENCOUNTER — TELEPHONE (OUTPATIENT)
Dept: INTERNAL MEDICINE | Facility: CLINIC | Age: 57
End: 2017-06-29

## 2017-06-29 NOTE — TELEPHONE ENCOUNTER
Reason for Call:  Other call back    Detailed comments: pt's wife radha called and said that pt needs knee surgery and is in a lot of pain and cries at night because of pain. pts wife said that pt does not speak up when pt sees dr davis and he won't  Get help that he needs. Please call pt wife because she wants dr davis to know that pt needs more help with knees and shoulder, she also said that pt feel in the bathroom when his knees gave out.    Phone Number Patient can be reached at: Cell number on file:    Telephone Information:   Mobile [phone]       Best Time: anytime    Can we leave a detailed message on this number? YES    Call taken on 6/29/2017 at 3:41 PM by EDYTA FIELD

## 2017-06-29 NOTE — TELEPHONE ENCOUNTER
Pt does not want to discuss with pt home please call pt wife back in am regarding his pain .Nesha Cunningham RN

## 2017-06-30 NOTE — TELEPHONE ENCOUNTER
"Spoke with patient's wife, Suzanne, about her concerns regarding her .  She is concerned of his knee pain and states that he cries at night due to the pain.  He also had a recent fall in the bathroom due to knee giving out.  Suzanne states that patient was not injured in fall.  Suzanne also states that patient will not get knee replacements like Dr. Suggs advised.  She is wondering about possible cortisone shot or pain medication.  I informed Suzanne that that was something the orthopedic doctor would address when patient goes to see him.  Referral is in place and patient told wife that he has an appointment scheduled.  Wife also asking if Dr. 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 Suggs and call her back.  Patient understood.  "

## 2017-06-30 NOTE — TELEPHONE ENCOUNTER
Spoke with Dr. Suggs who states that patient is aware of need for knee replacements and has agreed to doing this.  Dr. Suggs did tell patient that surgery does not need to be done now however that he would like him to see orthopedics.  Cortisone shot and medications can be addressed by ortho.  Dr. Suggs will not change diagnosis as he feels that the original diagnosis is correct and appropriate.  Called patient's wife back and informed her of this.  Wife understood and states that she will keep us updated on patient's condition.

## 2017-07-13 DIAGNOSIS — F51.01 PRIMARY INSOMNIA: ICD-10-CM

## 2017-07-13 NOTE — TELEPHONE ENCOUNTER
Controlled Substance Refill Request for zolpidem  Problem List Complete:  Yes    Last Written Prescription Date:  06/13/17  Last Fill Quantity: 30,   # refills: 0    Last Office Visit with [facility] primary care provider: 06/28/17    Clinic visit frequency required: Q 3 months     Future Office visit: 0    Controlled substance agreement on file: No.     Processing:  Fax Rx to [address]/[facility] pharmacy   checked in past 6 months?  No, route to RN

## 2017-07-14 RX ORDER — ZOLPIDEM TARTRATE 10 MG/1
TABLET ORAL
Qty: 30 TABLET | Refills: 0 | Status: SHIPPED | OUTPATIENT
Start: 2017-07-14 | End: 2017-08-12

## 2017-08-12 DIAGNOSIS — F51.01 PRIMARY INSOMNIA: ICD-10-CM

## 2017-08-12 DIAGNOSIS — K21.00 GASTROESOPHAGEAL REFLUX DISEASE WITH ESOPHAGITIS: ICD-10-CM

## 2017-08-14 NOTE — TELEPHONE ENCOUNTER
Controlled Substance Refill Request for Ambien  Problem List Complete:  No     PROVIDER TO CONSIDER COMPLETION OF PROBLEM LIST AND OVERVIEW/CONTROLLED SUBSTANCE AGREEMENT    Last Written Prescription Date:  7/14/17  Last Fill Quantity: 30,   # refills: 0    Last Office Visit with [facility] primary care provider: 6/28/17    Future Office visit:     Controlled substance agreement on file: No.     Processing[facility]  [facility]     checked in past 6 months?  No, route to RN    Carafate 1gm      Last Written Prescription Date: 5/9/17  Last Fill Quantity: 100,  # refills: 1   Last Office Visit with [facility], [facility] or [facility] prescribing provider: 6/28/17

## 2017-08-14 NOTE — TELEPHONE ENCOUNTER
Controlled Substance Refill Request for zolpidem  Problem List Complete:  Yes    Last Written Prescription Date:  07/14/17  Last Fill Quantity: 30,   # refills: 0    Last Office Visit with [facility] primary care provider: 06/28/17    Clinic visit frequency required: Q 3 months     Future Office visit: 0    Controlled substance agreement on file: No.     Processing:  Fax Rx to [address]     checked in past 6 months?  No, route to RN     sucralfate      Last Written Prescription Date: 05/09/17  Last Fill Quantity: 100,  # refills: 1   Last Office Visit with [facility], [facility] or [facility] prescribing provider: 06/28/17

## 2017-08-15 RX ORDER — SUCRALFATE 1 G/1
TABLET ORAL
Qty: 100 TABLET | Refills: 0 | Status: SHIPPED | OUTPATIENT
Start: 2017-08-15 | End: 2017-09-10

## 2017-08-15 RX ORDER — ZOLPIDEM TARTRATE 10 MG/1
TABLET ORAL
Qty: 30 TABLET | Refills: 0 | Status: SHIPPED | OUTPATIENT
Start: 2017-08-15 | End: 2017-09-10

## 2017-08-15 NOTE — TELEPHONE ENCOUNTER
Hand signed RX for ambien faxed to the specific pharmacy by myself.    Prescription(s) sent electronically to specified pharmacy.

## 2017-10-09 ENCOUNTER — TRANSFERRED RECORDS (OUTPATIENT)
Dept: HEALTH INFORMATION MANAGEMENT | Facility: CLINIC | Age: 57
End: 2017-10-09

## 2017-10-09 ENCOUNTER — HOSPITAL LABORATORY (OUTPATIENT)
Dept: OTHER | Facility: CLINIC | Age: 57
End: 2017-10-09

## 2017-10-09 LAB — CRYSTALS SNV MICRO: NORMAL

## 2017-10-12 DIAGNOSIS — K21.00 GASTROESOPHAGEAL REFLUX DISEASE WITH ESOPHAGITIS: ICD-10-CM

## 2017-10-12 RX ORDER — SUCRALFATE 1 G/1
TABLET ORAL
Qty: 100 TABLET | Refills: 3 | Status: SHIPPED | OUTPATIENT
Start: 2017-10-12 | End: 2018-03-24

## 2017-10-12 NOTE — TELEPHONE ENCOUNTER
sucralfate (CARAFATE) 1 GM tablet   Last Written Prescription Date: 9/12/17  Last Fill Quantity: 100,  # refills: 0   Last Office Visit with [facility], [facility] or [facility] prescribing provider:      6/28/17    Prescription approved per [facility] Refill Protocol.

## 2017-11-08 ENCOUNTER — OFFICE VISIT (OUTPATIENT)
Dept: INTERNAL MEDICINE | Facility: CLINIC | Age: 57
End: 2017-11-08
Payer: COMMERCIAL

## 2017-11-08 VITALS
WEIGHT: 264.9 LBS | BODY MASS INDEX: 37.92 KG/M2 | DIASTOLIC BLOOD PRESSURE: 82 MMHG | OXYGEN SATURATION: 96 % | SYSTOLIC BLOOD PRESSURE: 150 MMHG | HEIGHT: 70 IN | HEART RATE: 84 BPM | TEMPERATURE: 98 F

## 2017-11-08 DIAGNOSIS — K21.00 GASTROESOPHAGEAL REFLUX DISEASE WITH ESOPHAGITIS: ICD-10-CM

## 2017-11-08 DIAGNOSIS — E78.5 HYPERLIPIDEMIA LDL GOAL <130: ICD-10-CM

## 2017-11-08 DIAGNOSIS — F51.01 PRIMARY INSOMNIA: ICD-10-CM

## 2017-11-08 DIAGNOSIS — Z01.818 PREOP GENERAL PHYSICAL EXAM: Primary | ICD-10-CM

## 2017-11-08 DIAGNOSIS — K21.9 GASTROESOPHAGEAL REFLUX DISEASE WITHOUT ESOPHAGITIS: ICD-10-CM

## 2017-11-08 DIAGNOSIS — M17.11 PRIMARY OSTEOARTHRITIS OF RIGHT KNEE: ICD-10-CM

## 2017-11-08 DIAGNOSIS — I10 ESSENTIAL HYPERTENSION, BENIGN: ICD-10-CM

## 2017-11-08 DIAGNOSIS — F32.5 MAJOR DEPRESSIVE DISORDER WITH SINGLE EPISODE, IN FULL REMISSION (H): ICD-10-CM

## 2017-11-08 LAB
ANION GAP SERPL CALCULATED.3IONS-SCNC: 9 MMOL/L (ref 3–14)
BASOPHILS # BLD AUTO: 0 10E9/L (ref 0–0.2)
BASOPHILS NFR BLD AUTO: 0.3 %
BUN SERPL-MCNC: 15 MG/DL (ref 7–30)
CALCIUM SERPL-MCNC: 8.9 MG/DL (ref 8.5–10.1)
CHLORIDE SERPL-SCNC: 103 MMOL/L (ref 94–109)
CO2 SERPL-SCNC: 26 MMOL/L (ref 20–32)
CREAT SERPL-MCNC: 0.74 MG/DL (ref 0.66–1.25)
DIFFERENTIAL METHOD BLD: NORMAL
EOSINOPHIL # BLD AUTO: 0.1 10E9/L (ref 0–0.7)
EOSINOPHIL NFR BLD AUTO: 1.8 %
ERYTHROCYTE [DISTWIDTH] IN BLOOD BY AUTOMATED COUNT: 13.1 % (ref 10–15)
GFR SERPL CREATININE-BSD FRML MDRD: >90 ML/MIN/1.7M2
GLUCOSE SERPL-MCNC: 109 MG/DL (ref 70–99)
HCT VFR BLD AUTO: 48.3 % (ref 40–53)
HGB BLD-MCNC: 15.8 G/DL (ref 13.3–17.7)
LYMPHOCYTES # BLD AUTO: 2.2 10E9/L (ref 0.8–5.3)
LYMPHOCYTES NFR BLD AUTO: 32.1 %
MCH RBC QN AUTO: 30.3 PG (ref 26.5–33)
MCHC RBC AUTO-ENTMCNC: 32.7 G/DL (ref 31.5–36.5)
MCV RBC AUTO: 93 FL (ref 78–100)
MONOCYTES # BLD AUTO: 1 10E9/L (ref 0–1.3)
MONOCYTES NFR BLD AUTO: 15.1 %
NEUTROPHILS # BLD AUTO: 3.4 10E9/L (ref 1.6–8.3)
NEUTROPHILS NFR BLD AUTO: 50.7 %
PLATELET # BLD AUTO: 294 10E9/L (ref 150–450)
POTASSIUM SERPL-SCNC: 4 MMOL/L (ref 3.4–5.3)
RBC # BLD AUTO: 5.22 10E12/L (ref 4.4–5.9)
SODIUM SERPL-SCNC: 138 MMOL/L (ref 133–144)
WBC # BLD AUTO: 6.8 10E9/L (ref 4–11)

## 2017-11-08 PROCEDURE — 99215 OFFICE O/P EST HI 40 MIN: CPT | Performed by: INTERNAL MEDICINE

## 2017-11-08 PROCEDURE — 85025 COMPLETE CBC W/AUTO DIFF WBC: CPT | Performed by: INTERNAL MEDICINE

## 2017-11-08 PROCEDURE — 36415 COLL VENOUS BLD VENIPUNCTURE: CPT | Performed by: INTERNAL MEDICINE

## 2017-11-08 PROCEDURE — 80048 BASIC METABOLIC PNL TOTAL CA: CPT | Performed by: INTERNAL MEDICINE

## 2017-11-08 PROCEDURE — 93000 ELECTROCARDIOGRAM COMPLETE: CPT | Performed by: INTERNAL MEDICINE

## 2017-11-08 RX ORDER — METOPROLOL SUCCINATE 25 MG/1
25 TABLET, EXTENDED RELEASE ORAL DAILY
Qty: 90 TABLET | Refills: 0 | Status: SHIPPED | OUTPATIENT
Start: 2017-11-08 | End: 2018-02-11

## 2017-11-08 ASSESSMENT — PATIENT HEALTH QUESTIONNAIRE - PHQ9
SUM OF ALL RESPONSES TO PHQ QUESTIONS 1-9: 0
10. IF YOU CHECKED OFF ANY PROBLEMS, HOW DIFFICULT HAVE THESE PROBLEMS MADE IT FOR YOU TO DO YOUR WORK, TAKE CARE OF THINGS AT HOME, OR GET ALONG WITH OTHER PEOPLE: NOT DIFFICULT AT ALL
SUM OF ALL RESPONSES TO PHQ QUESTIONS 1-9: 0

## 2017-11-08 NOTE — MR AVS SNAPSHOT
After Visit Summary   11/8/2017    Ab Aguilar    MRN: [id number]           Patient Information     Date Of Birth          1960        Visit Information        Provider Department      11/8/2017 8:20 AM Shayne Suggs MD [facility]        Today's Diagnoses     Preop general physical exam    -  1    Primary osteoarthritis of right knee        Essential hypertension, benign        Hyperlipidemia LDL goal <130        Gastroesophageal reflux disease with esophagitis        Gastroesophageal reflux disease without esophagitis        Primary insomnia        Major depressive disorder with single episode, in full remission (H)          Care Instructions      PRE-OPERATIVE INSTRUCTIONS:     *  PLease start taking an additional medication for blood pressure support for the next couple of months     --Metoprolol 25 mg once per day.      *  Consider taking an over the counter iron supplement once per day prior the surgery to help supply the bone marrow with iron to get ready to make more red blood cells to replace the ones you are going to lose from the surgery.             *  No aspirin or NSAIDs (Mortin, advil ibuprofen, aleve, etc) within 7 days of surgery.    *  Tylenol (acetaminophen) OK to take if needed for pain or headache.  Follow instructions on the bottle    *  Stop any Fish Oil or vitamin E supplements for 7 days prior to surgery because these can affect platelet function.      *  ON THE MORNING OF SURGERY:     --Take Metoprolol 50 (2 x 25 mg tablets) on the morning of surgery with a small sip of water.      --Do NOT take the Lisinopril     *  Resume all medications at the same doses after surgery, unless instructed otherwise by the medical staff.     *  Attend all follow up appointments with the surgeons (and/or therapists if applicable) as instructed.     *  Contact the surgeon's office for any specific questions about after-surgery cares and follow up  instructions.        IF YOU REQUIRE NARCOTIC PAIN MEDICATION AFTER YOUR SURGERY:    --Take the narcotic pain medication exactly as prescribed, and use the absolute lowest dose needed for pain control.   The goal of pain medication is not complete pain relief, aim to just make it managable.      --Beware of drowsiness, nausea, vomiting, when taking this medication.  Do not drive, or operate dangerous equipment after taking this.     --The main side effects from narcotic pain medication can also include intestinal side effects including nausea, vomiting, constipation, or diarrhea.     --If your pain is not able to be controlled with the pain medication supplied to you, contact the surgeons because uncontrolled pain can be a sign of possible surgical complications.      --In case of constipation from pain medications, take over the counter Miralax powder or stool softner Senokot.  If you have a history ofonstipation with narcotic pain medication, consider taking Miralax powder on any day that you take a pain tablet.                                     Follow-ups after your visit        Who to contact     If you have questions or need follow up information about today's clinic visit or your schedule please contact [facility] directly at [phone].  Normal or non-critical lab and imaging results will be communicated to you by MyChart, letter or phone within 4 business days after the clinic has received the results. If you do not hear from us within 7 days, please contact the clinic through [employer] or phone. If you have a critical or abnormal lab result, we will notify you by phone as soon as possible.  Submit refill requests through [employer] or call your pharmacy and they will forward the refill request to us. Please allow 3 business days for your refill to be completed.          Additional Information About Your Visit        [employer] Information     [employer] gives you secure access to your  "electronic health record. If you see a primary care provider, you can also send messages to your care team and make appointments. If you have questions, please call your primary care clinic.  If you do not have a primary care provider, please call [phone] and they will assist you.        Care EveryWhere ID     This is your Care EveryWhere ID. This could be used by other organizations to access your [place] medical records  [id number]        Your Vitals Were     Pulse Temperature Height Pulse Oximetry BMI (Body Mass Index)       84 98  F (36.7  C) (Oral) 5' 10\" (1.778 m) 96% 38.01 kg/m2        Blood Pressure from Last 3 Encounters:   11/08/17 150/82   06/28/17 (!) 170/100   05/09/17 (!) 140/98    Weight from Last 3 Encounters:   11/08/17 264 lb 14.4 oz (120.2 kg)   06/28/17 255 lb 14.4 oz (116.1 kg)   05/09/17 263 lb 9.6 oz (119.6 kg)              We Performed the Following     Basic metabolic panel     CBC with platelets differential     EKG 12-lead complete w/read - Clinics          Today's Medication Changes          These changes are accurate as of: 11/8/17  9:11 AM.  If you have any questions, ask your nurse or doctor.               Start taking these medicines.        Dose/Directions    metoprolol 25 MG 24 hr tablet   Commonly known as:  TOPROL-XL   Used for:  Essential hypertension, benign   Started by:  Shayne Suggs MD        Dose:  25 mg   Take 1 tablet (25 mg) by mouth daily   Quantity:  90 tablet   Refills:  0            Where to get your medicines      These medications were sent to [id number] - [place], [place] - [address] AT [facility] [address]  [facility] [address], [facility] [address]    Hours:  24-hours Phone:  [phone]     metoprolol 25 MG 24 hr tablet                Primary Care Provider Office Phone # Fax #    Shayne Suggs [phone]       [address] [facility] [address]        Equal Access to Services     [facility] " GAAR : Hadii aad ku blayne Kern, waaxda luqadaha, qaybta kaalmada adeannabella, bayron stevein hayaalaw cruz chelita noemí . So [facility] [phone].    ATENCIÓN: Si habla español, tiene a mcclure disposición servicios gratuitos de asistencia lingüística. Llame al [phone].    We comply with applicable federal civil rights laws and [place] laws. We do not discriminate on the basis of race, color, national origin, age, disability, sex, sexual orientation, or gender identity.            Thank you!     Thank you for choosing [facility]  for your care. Our goal is always to provide you with excellent care. Hearing back from our patients is one way we can continue to improve our services. Please take a few minutes to complete the written survey that you may receive in the mail after your visit with us. Thank you!             Your Updated Medication List - Protect others around you: Learn how to safely use, store and throw away your medicines at www.disposemymeds.org.          This list is accurate as of: 11/8/17  9:11 AM.  Always use your most recent med list.                   Brand Name Dispense Instructions for use Diagnosis    aspirin 81 MG tablet      ONE DAILY    Essential hypertension, benign, Other and unspecified hyperlipidemia       ibuprofen 200 MG tablet    ADVIL/MOTRIN     Take 800 mg by mouth 3 times daily as needed.        lisinopril 40 MG tablet    PRINIVIL/ZESTRIL    90 tablet    Take 1 tablet (40 mg) by mouth daily    Essential hypertension, benign       metoprolol 25 MG 24 hr tablet    TOPROL-XL    90 tablet    Take 1 tablet (25 mg) by mouth daily    Essential hypertension, benign       multivitamin per tablet           omeprazole 20 MG CR capsule    priLOSEC    60 capsule    Take 2 capsules (40 mg) by mouth daily    Gastroesophageal reflux disease without esophagitis       pravastatin 40 MG tablet    PRAVACHOL    90 tablet    Take 1 tablet (40 mg) by mouth At Bedtime     Hyperlipidemia LDL goal <130       sucralfate 1 GM tablet    CARAFATE    100 tablet    TAKE 1 TABLET(1 GRAM) BY MOUTH FOUR TIMES DAILY AS NEEDED    Gastroesophageal reflux disease with esophagitis       zolpidem 10 MG tablet    AMBIEN    30 tablet    TAKE 1/2 TO 1 TABLET BY MOUTH EVERY NIGHT AT BEDTIME AS NEEDED FOR SLEEP    Primary insomnia

## 2017-11-08 NOTE — PROGRESS NOTES
[address] [id number] [phone]  Dept: [phone]    PRE-OP EVALUATION:  Today's date: 2017    Ab Aguilar (: 1960) presents for pre-operative evaluation assessment as requested by Dr. Lacey.  He requires evaluation and anesthesia risk assessment prior to undergoing surgery/procedure for treatment of R knee replacement .  Proposed procedure: R knee replacement    Date of Surgery/ Procedure: 17  Time of Surgery/ Procedure: [facility]/Surgical Facility: [facility]  Fax number for surgical facility: [phone]  Primary Physician: Shayne Suggs  Type of Anesthesia Anticipated: General    Patient has a Health Care Directive or Living Will:  NO    Preop Questions 2017   1.  Do you have a history of heart attack, stroke, stent, bypass or surgery on an artery in the head, neck, heart or legs? No   2.  Do you ever have any pain or discomfort in your chest? No   3.  Do you have a history of  Heart Failure? No   4.   Are you troubled by shortness of breath when:  walking on a level surface, or up a slight hill, or at night? No   5.  Do you currently have a cold, bronchitis or other respiratory infection? No   6.  Do you have a cough, shortness of breath, or wheezing? No   7.  Do you sometimes get pains in the calves of your legs when you walk? No   8. Do you or anyone in your family have previous history of blood clots? No   9.  Do you or does anyone in your family have a serious bleeding problem such as prolonged bleeding following surgeries or cuts? No   10. Have you ever had problems with anemia or been told to take iron pills? No   11. Have you had any abnormal blood loss such as black, tarry or bloody stools? No   12. Have you ever had a blood transfusion? No   13. Have you or any of your relatives ever had problems with anesthesia? No   14. Do you have sleep apnea, excessive snoring or daytime  drowsiness? No   15. Do you have any prosthetic heart valves? No   16. Do you have prosthetic joints? No           HPI:                                                      Brief HPI related to upcoming procedure: end stage DJD right knee      *  No recent infectious illnesses.    *  No recent cardiac or pulmonary issues or symptoms.    *  No problems performing vigorous physical activity, no changes in exercise tolerance.    *  No personal or family history of anesthesia complications.    *  No personal or family history of bleeding or clotting disorders.         MEDICAL HISTORY:                                                    Patient Active Problem List    Diagnosis Date Noted     Prediabetes 09/26/2012     Priority: Medium     A1C 6.1       Major depressive disorder with single episode, in full remission (H)      Priority: Medium     HYPERLIPIDEMIA LDL GOAL <130 10/31/2010     Priority: Medium     Obesity      Priority: Medium     Gastroesophageal reflux disease with esophagitis      Priority: Medium     Essential hypertension, benign 10/17/2002     Priority: Medium      Past Medical History:   Diagnosis Date     Esophageal reflux      Essential hypertension, benign      Major depression      Obesity      Other and unspecified hyperlipidemia      Prediabetes 9/26/12    A1C 6.1     Past Surgical History:   Procedure Laterality Date     C NONSPECIFIC PROCEDURE  1991    R ankle fracture, repair     C NONSPECIFIC PROCEDURE  age 6    B inguinal hernia repair     C NONSPECIFIC PROCEDURE  9/01    lumbar laminectomy     STRESS ECHO ([facility])  10/02    normal      STRESS ECHO ([facility])  9/05    normal stress echo     Current Outpatient Prescriptions   Medication Sig Dispense Refill     metoprolol (TOPROL-XL) 25 MG 24 hr tablet Take 1 tablet (25 mg) by mouth daily 90 tablet 0     zolpidem (AMBIEN) 10 MG tablet TAKE 1/2 TO 1 TABLET BY MOUTH EVERY NIGHT AT BEDTIME AS NEEDED FOR SLEEP 30 tablet 0     sucralfate (CARAFATE) 1 GM  "tablet TAKE 1 TABLET(1 GRAM) BY MOUTH FOUR TIMES DAILY AS NEEDED 100 tablet 3     omeprazole (PRILOSEC) 20 MG CR capsule Take 2 capsules (40 mg) by mouth daily 60 capsule 11     lisinopril (PRINIVIL/ZESTRIL) 40 MG tablet Take 1 tablet (40 mg) by mouth daily 90 tablet 3     pravastatin (PRAVACHOL) 40 MG tablet Take 1 tablet (40 mg) by mouth At Bedtime 90 tablet 3     MULTIVITAMINS OR TABS        ibuprofen (ADVIL,MOTRIN) 200 MG tablet Take 800 mg by mouth 3 times daily as needed.       ASPIRIN 81 MG OR TABS ONE DAILY       OTC products: None, except as noted above and no recent use of OTC ASA, NSAIDS or Steroids    Allergies   Allergen Reactions     Atorvastatin GI Disturbance     Abdominal and intestinal pains from atorvastatin      Latex Allergy: NO    Social History   Substance Use Topics     Smoking status: Never Smoker     Smokeless tobacco: Never Used     Alcohol use Yes      Comment: 12-18 q week BEER     History   Drug Use No       REVIEW OF SYSTEMS:                                                    C: NEGATIVE for fever, chills, change in weight  I: NEGATIVE for worrisome rashes, moles or lesions  E: NEGATIVE for vision changes or irritation  E/M: NEGATIVE for ear, mouth and throat problems  R: NEGATIVE for significant cough or SOB  B: NEGATIVE for masses, tenderness or discharge  CV: NEGATIVE for chest pain, palpitations or peripheral edema  GI: NEGATIVE for nausea, abdominal pain, heartburn, or change in bowel habits  : NEGATIVE for frequency, dysuria, or hematuria  M: NEGATIVE for significant arthralgias or myalgia  N: NEGATIVE for weakness, dizziness or paresthesias  E: NEGATIVE for temperature intolerance, skin/hair changes  H: NEGATIVE for bleeding problems  P: NEGATIVE for changes in mood or affect    EXAM:                                                    /82  Pulse 84  Temp 98  F (36.7  C) (Oral)  Ht 5' 10\" (1.778 m)  Wt 264 lb 14.4 oz (120.2 kg)  SpO2 96%  BMI 38.01 kg/m2    GENERAL " alert and no distress.  EYES conjunctivae/corneas clear. PERRL, EOM's intact  HENT: NCAT,oral and posterior pharynx without lesions or erythema, facies symmetric  NECK: Neck supple. No LAD, without thyroidmegaly or JVD.  RESP: Clear to ausculation bilaterally without wheezes or crackles. Normal BS in all fields.  CV: RRR normal S1S2 without  murmurs, rubs or gallops. PMI normal  LYMPH: no cervical lymph adenopathy appreciated  GI: NTND, no organomegaly, normal BS in all quadrants, without rebound or guarding  MS: No cyanosis, clubbing or edema noted bilaterally in Upper and/or Lower Extremities  SKIN: no significant ulcers, lesions or rashes on the visualized portions of the skin  NEURO: Alert and Oriented x 3, Gait normal. Reflexes normal and symmetric. Sensation grossly WNL..  PSYCH: Alert and oriented times 3; speech- coherent , normal rate and volume; able to articulate logical thoughts, able to abstract reason, no tangential thoughts, no hallucinations or delusions, affect- normal     DIAGNOSTICS:                                                    EKG (11/8/2017): appears normal, NSR, normal axis, normal intervals, no acute ST/T changes c/w ischemia, no LVH by voltage criteria, unchanged from previous tracings    Recent Labs   Lab Test  02/14/17   0852  01/27/16   0746  10/30/14   0751  09/23/13   0821   HGB  16.1  15.4  15.3   --    PLT  245  272  253   --    NA  138  138  140  142   POTASSIUM  3.8  4.0  4.6  4.9   CR  0.70  0.76  0.80  0.65*   A1C   --    --   5.9  5.7      Results for orders placed or performed in visit on 11/08/17   CBC with platelets differential   Result Value Ref Range    WBC 6.8 4.0 - 11.0 10e9/L    RBC Count 5.22 4.4 - 5.9 10e12/L    Hemoglobin 15.8 13.3 - 17.7 g/dL    Hematocrit 48.3 40.0 - 53.0 %    MCV 93 78 - 100 fl    MCH 30.3 26.5 - 33.0 pg    MCHC 32.7 31.5 - 36.5 g/dL    RDW 13.1 10.0 - 15.0 %    Platelet Count 294 150 - 450 10e9/L    Diff Method Automated Method     %  Neutrophils 50.7 %    % Lymphocytes 32.1 %    % Monocytes 15.1 %    % Eosinophils 1.8 %    % Basophils 0.3 %    Absolute Neutrophil 3.4 1.6 - 8.3 10e9/L    Absolute Lymphocytes 2.2 0.8 - 5.3 10e9/L    Absolute Monocytes 1.0 0.0 - 1.3 10e9/L    Absolute Eosinophils 0.1 0.0 - 0.7 10e9/L    Absolute Basophils 0.0 0.0 - 0.2 10e9/L   Basic metabolic panel   Result Value Ref Range    Sodium 138 133 - 144 mmol/L    Potassium 4.0 3.4 - 5.3 mmol/L    Chloride 103 94 - 109 mmol/L    Carbon Dioxide 26 20 - 32 mmol/L    Anion Gap 9 3 - 14 mmol/L    Glucose 109 (H)  **nonfasting 70 - 99 mg/dL    Urea Nitrogen 15 7 - 30 mg/dL    Creatinine 0.74 0.66 - 1.25 mg/dL    GFR Estimate >90 >60 mL/min/1.7m2    GFR Estimate If Black >90 >60 mL/min/1.7m2    Calcium 8.9 8.5 - 10.1 mg/dL      IMPRESSION:                                                    Reason for surgery/procedure: end stage DJD right knee    Diagnosis/reason for consult:     1. Preop general physical exam    2. Primary osteoarthritis of right knee    3. Essential hypertension, benign    4. Hyperlipidemia LDL goal <130    5. Gastroesophageal reflux disease with esophagitis    6. Gastroesophageal reflux disease without esophagitis    7. Primary insomnia    8. Major depressive disorder with single episode, in full remission (H)         The proposed surgical procedure is considered INTERMEDIATE risk.    REVISED CARDIAC RISK INDEX  The patient has the following serious cardiovascular risks for perioperative complications such as (MI, PE, VFib and 3  AV Block):  No serious cardiac risks  INTERPRETATION: 1 risks: Class II (low risk - 0.9% complication rate)    The patient has the following additional risks for perioperative complications:  No identified additional risks      ICD-10-CM    1. Preop general physical exam Z01.818 EKG 12-lead complete w/read - Clinics     CBC with platelets differential     Basic metabolic panel   2. Primary osteoarthritis of right knee M17.11    3.  Essential hypertension, benign I10 CBC with platelets differential     Basic metabolic panel     metoprolol (TOPROL-XL) 25 MG 24 hr tablet   4. Hyperlipidemia LDL goal <130 E78.5    5. Gastroesophageal reflux disease with esophagitis K21.0    6. Gastroesophageal reflux disease without esophagitis K21.9    7. Primary insomnia F51.01    8. Major depressive disorder with single episode, in full remission (H) F32.5        RECOMMENDATIONS:                                                        Cardiovascular Risk  Adding Metoprolol for additional blood pressure support.   He should continue this medication before and after surgery        Pulmonary Risk  No active pulmonary issues at this time.        --Patient is to take all scheduled medications on the day of surgery EXCEPT for modifications listed below.    Anticoagulant or Antiplatelet Medication Use  No ASA or NSAIDs within 7 days of surgery         ACE Inhibitor or Angiotensin Receptor Blocker (ARB) Use  Ace inhibitor or Angiotensin Receptor Blocker (ARB) and should HOLD this medication for the 24 hours prior to surgery.          APPROVAL GIVEN to proceed with proposed procedure, without further diagnostic evaluation       Signed Electronically by:   Shayne Suggs M.D.  Dept. of Internal Medicine  [facility]     Copy of this evaluation report is provided to requesting physician.    [facility] Preop Guidelines  Answers for HPI/ROS submitted by the patient on 11/8/2017   If you checked off any problems, how difficult have these problems made it for you to do your work, take care of things at home, or get along with other people?: Not difficult at all  PHQ9 TOTAL SCORE: 0

## 2017-11-08 NOTE — NURSING NOTE
"Chief Complaint   Patient presents with     Pre-Op Exam       Initial /82  Pulse 84  Temp 98  F (36.7  C) (Oral)  Ht 5' 10\" (1.778 m)  Wt 264 lb 14.4 oz (120.2 kg)  SpO2 96%  BMI 38.01 kg/m2 Estimated body mass index is 38.01 kg/(m^2) as calculated from the following:    Height as of this encounter: 5' 10\" (1.778 m).    Weight as of this encounter: 264 lb 14.4 oz (120.2 kg).  Medication Reconciliation: complete   Lois Harley CMA      "

## 2017-11-08 NOTE — PATIENT INSTRUCTIONS
PRE-OPERATIVE INSTRUCTIONS:     *  PLease start taking an additional medication for blood pressure support for the next couple of months     --Metoprolol 25 mg once per day.      *  Consider taking an over the counter iron supplement once per day prior the surgery to help supply the bone marrow with iron to get ready to make more red blood cells to replace the ones you are going to lose from the surgery.             *  No aspirin or NSAIDs (Mortin, advil ibuprofen, aleve, etc) within 7 days of surgery.    *  Tylenol (acetaminophen) OK to take if needed for pain or headache.  Follow instructions on the bottle    *  Stop any Fish Oil or vitamin E supplements for 7 days prior to surgery because these can affect platelet function.      *  ON THE MORNING OF SURGERY:     --Take Metoprolol 50 (2 x 25 mg tablets) on the morning of surgery with a small sip of water.      --Do NOT take the Lisinopril     *  Resume all medications at the same doses after surgery, unless instructed otherwise by the medical staff.     *  Attend all follow up appointments with the surgeons (and/or therapists if applicable) as instructed.     *  Contact the surgeon's office for any specific questions about after-surgery cares and follow up instructions.        IF YOU REQUIRE NARCOTIC PAIN MEDICATION AFTER YOUR SURGERY:    --Take the narcotic pain medication exactly as prescribed, and use the absolute lowest dose needed for pain control.   The goal of pain medication is not complete pain relief, aim to just make it managable.      --Beware of drowsiness, nausea, vomiting, when taking this medication.  Do not drive, or operate dangerous equipment after taking this.     --The main side effects from narcotic pain medication can also include intestinal side effects including nausea, vomiting, constipation, or diarrhea.     --If your pain is not able to be controlled with the pain medication supplied to you, contact the surgeons because uncontrolled  pain can be a sign of possible surgical complications.      --In case of constipation from pain medications, take over the counter Miralax powder or stool softner Senokot.  If you have a history ofonstipation with narcotic pain medication, consider taking Miralax powder on any day that you take a pain tablet.

## 2017-11-09 ASSESSMENT — PATIENT HEALTH QUESTIONNAIRE - PHQ9: SUM OF ALL RESPONSES TO PHQ QUESTIONS 1-9: 0

## 2017-11-11 DIAGNOSIS — F51.01 PRIMARY INSOMNIA: ICD-10-CM

## 2017-11-13 RX ORDER — ZOLPIDEM TARTRATE 10 MG/1
TABLET ORAL
Qty: 30 TABLET | Refills: 0 | Status: SHIPPED | OUTPATIENT
Start: 2017-11-13 | End: 2017-12-21

## 2017-11-13 NOTE — TELEPHONE ENCOUNTER
Last office visit 11/8/17    Routing refill request to provider for review/approval because:  Drug not on the [facility] refill protocol , or  controlled substance

## 2017-11-24 ENCOUNTER — HOME INFUSION (PRE-WILLOW HOME INFUSION) (OUTPATIENT)
Dept: PHARMACY | Facility: CLINIC | Age: 57
End: 2017-11-24

## 2017-11-29 NOTE — PROGRESS NOTES
This is a recent snapshot of the patient's [facility] medical record.  For current drug dose and complete information and questions, call [phone] or In Basket pool, [facility] ([id number])  CSN Number:  [id number]

## 2017-12-13 ENCOUNTER — TRANSFERRED RECORDS (OUTPATIENT)
Dept: HEALTH INFORMATION MANAGEMENT | Facility: CLINIC | Age: 57
End: 2017-12-13

## 2017-12-21 DIAGNOSIS — F51.01 PRIMARY INSOMNIA: ICD-10-CM

## 2017-12-21 RX ORDER — ZOLPIDEM TARTRATE 10 MG/1
TABLET ORAL
Qty: 30 TABLET | Refills: 0 | Status: SHIPPED | OUTPATIENT
Start: 2017-12-21 | End: 2018-01-26

## 2017-12-21 NOTE — TELEPHONE ENCOUNTER
Controlled Substance Refill Request for zolpidem  Problem List Complete:  Yes    Last Written Prescription Date:  11/13/17  Last Fill Quantity: 30,   # refills: 0    Last Office Visit with [facility] primary care provider: 11/08/17    Clinic visit frequency required: Q 3 months     Future Office visit: 0    Controlled substance agreement on file: No.     Processing:  Fax Rx to [address] pharmacy     checked in past 6 months?  No, route to RN

## 2017-12-23 ENCOUNTER — TELEPHONE (OUTPATIENT)
Dept: INTERNAL MEDICINE | Facility: CLINIC | Age: 57
End: 2017-12-23

## 2017-12-23 NOTE — TELEPHONE ENCOUNTER
"12/23/17    Patient is due for a Colonoscopy screening.    Patient Communication Preferences indicate  Do not contact  and/or communication by \"Phone\" is not preferred. Call not required per Outreach team.    Radha Serrano    "

## 2018-01-16 ENCOUNTER — HOSPITAL ENCOUNTER (EMERGENCY)
Facility: CLINIC | Age: 58
Discharge: HOME | End: 2018-01-16
Attending: EMERGENCY MEDICINE | Admitting: EMERGENCY MEDICINE
Payer: COMMERCIAL

## 2018-01-16 VITALS
RESPIRATION RATE: 18 BRPM | TEMPERATURE: 98.6 F | OXYGEN SATURATION: 96 % | SYSTOLIC BLOOD PRESSURE: 123 MMHG | DIASTOLIC BLOOD PRESSURE: 83 MMHG

## 2018-01-16 DIAGNOSIS — R51.9 NONINTRACTABLE HEADACHE, UNSPECIFIED CHRONICITY PATTERN, UNSPECIFIED HEADACHE TYPE: ICD-10-CM

## 2018-01-16 DIAGNOSIS — J10.1 INFLUENZA A: ICD-10-CM

## 2018-01-16 LAB
DEPRECATED S PYO AG THROAT QL EIA: NORMAL
FLUAV+FLUBV AG SPEC QL: NEGATIVE
FLUAV+FLUBV AG SPEC QL: POSITIVE
SPECIMEN SOURCE: ABNORMAL
SPECIMEN SOURCE: NORMAL

## 2018-01-16 PROCEDURE — 25000125 ZZHC RX 250: Performed by: EMERGENCY MEDICINE

## 2018-01-16 PROCEDURE — 87804 INFLUENZA ASSAY W/OPTIC: CPT | Performed by: EMERGENCY MEDICINE

## 2018-01-16 PROCEDURE — 96375 TX/PRO/DX INJ NEW DRUG ADDON: CPT

## 2018-01-16 PROCEDURE — 25000132 ZZH RX MED GY IP 250 OP 250 PS 637: Performed by: EMERGENCY MEDICINE

## 2018-01-16 PROCEDURE — 99284 EMERGENCY DEPT VISIT MOD MDM: CPT | Mod: 25

## 2018-01-16 PROCEDURE — 87880 STREP A ASSAY W/OPTIC: CPT | Performed by: EMERGENCY MEDICINE

## 2018-01-16 PROCEDURE — 25000128 H RX IP 250 OP 636: Performed by: EMERGENCY MEDICINE

## 2018-01-16 PROCEDURE — 96374 THER/PROPH/DIAG INJ IV PUSH: CPT

## 2018-01-16 PROCEDURE — 96361 HYDRATE IV INFUSION ADD-ON: CPT

## 2018-01-16 PROCEDURE — 87081 CULTURE SCREEN ONLY: CPT | Performed by: EMERGENCY MEDICINE

## 2018-01-16 RX ORDER — SODIUM CHLORIDE 9 MG/ML
1000 INJECTION, SOLUTION INTRAVENOUS CONTINUOUS
Status: DISCONTINUED | OUTPATIENT
Start: 2018-01-16 | End: 2018-01-16 | Stop reason: HOSPADM

## 2018-01-16 RX ORDER — DIPHENHYDRAMINE HYDROCHLORIDE 50 MG/ML
25 INJECTION INTRAMUSCULAR; INTRAVENOUS
Status: COMPLETED | OUTPATIENT
Start: 2018-01-16 | End: 2018-01-16

## 2018-01-16 RX ORDER — IBUPROFEN 400 MG/1
400 TABLET, FILM COATED ORAL ONCE
Status: COMPLETED | OUTPATIENT
Start: 2018-01-16 | End: 2018-01-16

## 2018-01-16 RX ORDER — METOCLOPRAMIDE HYDROCHLORIDE 5 MG/ML
10 INJECTION INTRAMUSCULAR; INTRAVENOUS ONCE
Status: COMPLETED | OUTPATIENT
Start: 2018-01-16 | End: 2018-01-16

## 2018-01-16 RX ORDER — PROCHLORPERAZINE MALEATE 10 MG
10 TABLET ORAL ONCE
Status: COMPLETED | OUTPATIENT
Start: 2018-01-16 | End: 2018-01-16

## 2018-01-16 RX ORDER — ONDANSETRON 4 MG/1
4 TABLET, ORALLY DISINTEGRATING ORAL EVERY 6 HOURS PRN
Qty: 10 TABLET | Refills: 0 | Status: SHIPPED | OUTPATIENT
Start: 2018-01-16 | End: 2018-01-19

## 2018-01-16 RX ORDER — ACETAMINOPHEN 500 MG
1000 TABLET ORAL ONCE
Status: COMPLETED | OUTPATIENT
Start: 2018-01-16 | End: 2018-01-16

## 2018-01-16 RX ORDER — ONDANSETRON 4 MG/1
4 TABLET, ORALLY DISINTEGRATING ORAL ONCE
Status: COMPLETED | OUTPATIENT
Start: 2018-01-16 | End: 2018-01-16

## 2018-01-16 RX ADMIN — SODIUM CHLORIDE 1000 ML: 9 INJECTION, SOLUTION INTRAVENOUS at 12:22

## 2018-01-16 RX ADMIN — ACETAMINOPHEN 1000 MG: 500 TABLET, FILM COATED ORAL at 11:05

## 2018-01-16 RX ADMIN — DIPHENHYDRAMINE HYDROCHLORIDE 25 MG: 50 INJECTION, SOLUTION INTRAMUSCULAR; INTRAVENOUS at 13:42

## 2018-01-16 RX ADMIN — METOCLOPRAMIDE 10 MG: 5 INJECTION, SOLUTION INTRAMUSCULAR; INTRAVENOUS at 13:02

## 2018-01-16 RX ADMIN — ONDANSETRON 4 MG: 4 TABLET, ORALLY DISINTEGRATING ORAL at 11:05

## 2018-01-16 RX ADMIN — IBUPROFEN 400 MG: 400 TABLET ORAL at 11:05

## 2018-01-16 RX ADMIN — PROCHLORPERAZINE MALEATE 10 MG: 10 TABLET, FILM COATED ORAL at 13:46

## 2018-01-16 ASSESSMENT — ENCOUNTER SYMPTOMS
COUGH: 1
SORE THROAT: 1
FEVER: 1
ABDOMINAL PAIN: 1
HEADACHES: 1
MYALGIAS: 1
CHILLS: 1
ARTHRALGIAS: 1
SHORTNESS OF BREATH: 1

## 2018-01-16 NOTE — ED NOTES
Bed: ED11  Expected date:   Expected time:   Means of arrival:   Comments:  [place] - 514 - 57 M fever HA eta 1225

## 2018-01-16 NOTE — ED AVS SNAPSHOT
Emergency Department    [address] [facility] [address]    Phone:  [phone]    Fax:  [phone]                                       Ab Aguilar   MRN: [id number]    Department:   Emergency Department   Date of Visit:  1/16/2018           Patient Information     Date Of Birth          1960        Your diagnoses for this visit were:     Influenza A        You were seen by Rick Aguirre DO.      Follow-up Information     Follow up with Shayne Suggs MD In 3 days.    Specialty:  Internal Medicine    Why:  As needed    Contact information:    [address] [facility] [address]  [phone]          Follow up with  Emergency Department.    Specialty:  EMERGENCY MEDICINE    Why:  If symptoms worsen    Contact information:    [address] [facility] [address] [phone]        Discharge Instructions         Influenza (Adult)    Influenza is also called the flu. It is a viral illness that affects the air passages of your lungs. It is different from the common cold. The flu can easily be passed from one to person to another. It may be spread through the air by coughing and sneezing. Or it can be spread by touching the sick person and then touching your own eyes, nose, or mouth.  The flu starts 1 to 3 days after you are exposed to the flu virus. It may last for 1 to 2 weeks but many people feel tired or fatigued for many weeks afterward. You usually don t need to take antibiotics unless you have a complication. This might be an ear or sinus infection or pneumonia.  Symptoms of the flu may be mild or severe. They can include extreme tiredness (wanting to stay in bed all day), chills, fevers, muscle aches, soreness with eye movement, headache, and a dry, hacking cough.  Home care  Follow these guidelines when caring for yourself at home:    Avoid being around cigarette smoke, whether yours or other people s.    Acetaminophen or ibuprofen  will help ease your fever, muscle aches, and headache. Don t give aspirin to anyone younger than 18 who has the flu. Aspirin can harm the liver.    Nausea and loss of appetite are common with the flu. Eat light meals. Drink 6 to 8 glasses of liquids every day. Good choices are water, sport drinks, soft drinks without caffeine, juices, tea, and soup. Extra fluids will also help loosen secretions in your nose and lungs.    Over-the-counter cold medicines will not make the flu go away faster. But the medicines may help with coughing, sore throat, and congestion in your nose and sinuses. Don t use a decongestant if you have high blood pressure.    Stay home until your fever has been gone for at least 24 hours without using medicine to reduce fever.  Follow-up care  Follow up with your healthcare provider, or as advised, if you are not getting better over the next week.  If you are age 65 or older, talk with your provider about getting a pneumococcal vaccine every 5 years. You should also get this vaccine if you have chronic asthma or COPD. All adults should get a flu vaccine every fall. Ask your provider about this.  When to seek medical advice  Call your healthcare provider right away if any of these occur:    Cough with lots of colored mucus (sputum) or blood in your mucus    Chest pain, shortness of breath, wheezing, or trouble breathing    Severe headache, or face, neck, or ear pain    New rash with fever    Fever of 100.4 F (38 C) or higher, or as directed by your healthcare provider    Confusion, behavior change, or seizure    Severe weakness or dizziness    You get a new fever or cough after getting better for a few days  Date Last Reviewed: 1/1/2017 2000-2017 [employer]. [address] [place] [address]. All rights reserved. This information is not intended as a substitute for professional medical care. Always follow your healthcare professional's instructions.          24 Hour  Appointment Hotline       To make an appointment at any [facility], call [phone] ([phone]). If you don't have a family doctor or clinic, we will help you find one. [place] clinics are conveniently located to serve the needs of you and your family.             Review of your medicines      START taking        Dose / Directions Last dose taken    ondansetron 4 MG ODT tab   Commonly known as:  ZOFRAN ODT   Dose:  4 mg   Quantity:  10 tablet        Take 1 tablet (4 mg) by mouth every 6 hours as needed for nausea   Refills:  0          Our records show that you are taking the medicines listed below. If these are incorrect, please call your family doctor or clinic.        Dose / Directions Last dose taken    aspirin 81 MG tablet        ONE DAILY   Refills:  0        ibuprofen 200 MG tablet   Commonly known as:  ADVIL/MOTRIN   Dose:  800 mg        Take 800 mg by mouth 3 times daily as needed.   Refills:  0        lisinopril 40 MG tablet   Commonly known as:  PRINIVIL/ZESTRIL   Dose:  40 mg   Quantity:  90 tablet        Take 1 tablet (40 mg) by mouth daily   Refills:  3        metoprolol succinate 25 MG 24 hr tablet   Commonly known as:  TOPROL-XL   Dose:  25 mg   Quantity:  90 tablet        Take 1 tablet (25 mg) by mouth daily   Refills:  0        multivitamin per tablet        Refills:  0        omeprazole 20 MG CR capsule   Commonly known as:  priLOSEC   Dose:  40 mg   Quantity:  60 capsule        Take 2 capsules (40 mg) by mouth daily   Refills:  11        pravastatin 40 MG tablet   Commonly known as:  PRAVACHOL   Dose:  40 mg   Quantity:  90 tablet        Take 1 tablet (40 mg) by mouth At Bedtime   Refills:  3        sucralfate 1 GM tablet   Commonly known as:  CARAFATE   Quantity:  100 tablet        TAKE 1 TABLET(1 GRAM) BY MOUTH FOUR TIMES DAILY AS NEEDED   Refills:  3        zolpidem 10 MG tablet   Commonly known as:  AMBIEN   Quantity:  30 tablet        TAKE 1/2 OR 1 TABLET BY MOUTH AT BEDTIME  AS NEEDED FOR SLEEP   Refills:  0                Prescriptions were sent or printed at these locations (1 Prescription)                   Other Prescriptions                Printed at Department/Unit printer (1 of 1)         ondansetron (ZOFRAN ODT) 4 MG ODT tab                Procedures and tests performed during your visit     Beta strep group A culture    Influenza A/B antigen    Rapid strep screen      Orders Needing Specimen Collection     None      Pending Results     Date and Time Order Name Status Description    1/16/2018 1055 Beta strep group A culture In process             Pending Culture Results     Date and Time Order Name Status Description    1/16/2018 1055 Beta strep group A culture In process             Pending Results Instructions     If you had any lab results that were not finalized at the time of your Discharge, you can call the ED Lab Result RN at [phone]. You will be contacted by this team for any positive Lab results or changes in treatment. The nurses are available 7 days a week from 10A to 6:30P.  You can leave a message 24 hours per day and they will return your call.        Test Results From Your Hospital Stay        1/16/2018 11:40 AM      Component Results     Component Value Ref Range & Units Status    Influenza A/B Agn Specimen Nares  Final    Influenza A Positive (A) NEG^Negative Final    Influenza B Negative NEG^Negative Final    Test results must be correlated with clinical data. If necessary, results   should be confirmed by a molecular assay or viral culture.           1/16/2018 11:54 AM      Component Results     Component    Specimen Description    Throat    Rapid Strep A Screen    NEGATIVE: No Group A streptococcal antigen detected by immunoassay, await culture report.         1/16/2018 11:55 AM                Clinical Quality Measure: Blood Pressure Screening     Your blood pressure was checked while you were in the emergency department today. The last reading we  obtained was  BP: 123/83 . Please read the guidelines below about what these numbers mean and what you should do about them.  If your systolic blood pressure (the top number) is less than 120 and your diastolic blood pressure (the bottom number) is less than 80, then your blood pressure is normal. There is nothing more that you need to do about it.  If your systolic blood pressure (the top number) is 120-139 or your diastolic blood pressure (the bottom number) is 80-89, your blood pressure may be higher than it should be. You should have your blood pressure rechecked within a year by a primary care provider.  If your systolic blood pressure (the top number) is 140 or greater or your diastolic blood pressure (the bottom number) is 90 or greater, you may have high blood pressure. High blood pressure is treatable, but if left untreated over time it can put you at risk for heart attack, stroke, or kidney failure. You should have your blood pressure rechecked by a primary care provider within the next 4 weeks.  If your provider in the emergency department today gave you specific instructions to follow-up with your doctor or provider even sooner than that, you should follow that instruction and not wait for up to 4 weeks for your follow-up visit.        Thank you for choosing [facility]       Thank you for choosing [facility] for your care. Our goal is always to provide you with excellent care. Hearing back from our patients is one way we can continue to improve our services. Please take a few minutes to complete the written survey that you may receive in the mail after you visit with us. Thank you!        [facility] Information     [facility] gives you secure access to your electronic health record. If you see a primary care provider, you can also send messages to your care team and make appointments. If you have questions, please call your primary care clinic.  If you do not have a primary care provider, please call [phone]  and they will assist you.        Care EveryWhere ID     This is your Care EveryWhere ID. This could be used by other organizations to access your [place] medical records  [id number]        Equal Access to Services     GARRISON DEVI : Pedro Luis Kern, dee dee pena, roel mireles, bayron doyle. So [facility] [phone].    ATENCIÓN: Si habla español, tiene a mcclure disposición servicios gratuitos de asistencia lingüística. Llame al [phone].    We comply with applicable federal civil rights laws and [place] laws. We do not discriminate on the basis of race, color, national origin, age, disability, sex, sexual orientation, or gender identity.            After Visit Summary       This is your record. Keep this with you and show to your community pharmacist(s) and doctor(s) at your next visit.

## 2018-01-16 NOTE — ED AVS SNAPSHOT
Emergency Department    [address] [place] [address]    Phone:  [phone]    Fax:  [phone]                                       Ab Aguilar   MRN: [id number]    Department:   Emergency Department   Date of Visit:  1/16/2018           After Visit Summary Signature Page     I have received my discharge instructions, and my questions have been answered. I have discussed any challenges I see with this plan with the nurse or doctor.    ..........................................................................................................................................  Patient/Patient Representative Signature      ..........................................................................................................................................  Patient Representative Print Name and Relationship to Patient    ..................................................               ................................................  Date                                            Time    ..........................................................................................................................................  Reviewed by Signature/Title    ...................................................              ..............................................  Date                                                            Time

## 2018-01-16 NOTE — ED PROVIDER NOTES
History     Chief Complaint:  Fever, Cough, Body Aches    HPI   Ab Aguilar is a 57 year old male with a history of hypertension and hyperlipidemia who presents with headache, body aches, chills, and fever. The patient reports developing his symptoms yesterday in the morning and have progressively worsened, so he presents for further evaluation and treatment. He additionally notes nausea without emesis, sore throat, shortness of breath secondary to coughing, and mild left sided abdominal pain. The patient denies chest pain or rash. He endorses taking Tylenol at midnight with no symptomatic relief. He denies receiving the flu shot this year.    Allergies:  Atorvastatin      Medications:    Lisinopril  Metoprolol  Pravastatin     Past Medical History:    Esophageal reflux  Hypertension  Depression  Obesity  Hyperlipidemia  Prediabetes    Past Surgical History:    Orthopedic surgery x3  Hernia repair    Family History:    Hypertension - father  Heart disease - brother    Social History:  Smoking status: no  Alcohol use: yes   PCP: Shayne Suggs   Patient presents via EMS.   Marital Status:       Review of Systems   Constitutional: Positive for chills and fever.   HENT: Positive for congestion and sore throat.    Respiratory: Positive for cough and shortness of breath.    Cardiovascular: Negative for chest pain.   Gastrointestinal: Positive for abdominal pain.   Musculoskeletal: Positive for arthralgias and myalgias.   Skin: Negative for rash.   Neurological: Positive for headaches.   All other systems reviewed and are negative.    Physical Exam     Patient Vitals for the past 24 hrs:   BP Temp Temp src Heart Rate Resp SpO2   01/16/18 1424 - - - - - 96 %   01/16/18 1420 - 98.6  F (37  C) Oral - - -   01/16/18 1400 123/83 - - - - 96 %   01/16/18 1300 113/78 - - - - 94 %   01/16/18 1225 121/81 - - - - 94 %   01/16/18 1224 - 100.5  F (38.1  C) Oral - - -   01/16/18 1115 - - - - - 95 %   01/16/18  1100 - - - 117 - -   01/16/18 1059 (!) 121/98 - - - 18 -   01/16/18 1057 - 103.1  F (39.5  C) - - - 96 %     Physical Exam  General: Alert and cooperative with exam. Patient in mild to moderate distress. Normal mentation.  Head:  Scalp is NC/AT  Eyes:  No scleral icterus, PERRL,   ENT:  The external nose and ears are normal. The oropharynx is with moderate posterior erythema, no trismus; mucus membranes are moist. Uvula midline, no evidence of deep space infection.  Neck:  Normal range of motion without rigidity.  CV:  Regular rhythm, mildly tachycardic    No pathologic murmur   Resp:  Breath sounds are clear bilaterally    Non-labored, no retractions or accessory muscle use  GI:  Abdomen is soft, no distension, mild diffuse tenderness. Obese. No peritoneal signs  MS:  No lower extremity edema   Skin:  Warm and dry, No rash or lesions noted.  Neuro: Oriented x 3. No gross motor deficits.    Emergency Department Course     Laboratory:  Influenza: Positive A  Strep: Negative  Strep culture pending    Interventions:  1105: Tylenol 1000 mg PO  1105: Ibuprofen 400 mg PO  1105: Zofran 4mg IV   1302: Reglan 10 mg IV  1342: Benadryl 25 mg IV  1346: Compazine 10 mg PO     Emergency Department Course:  Past medical records, nursing notes, and vitals reviewed.  1053: I performed an exam of the patient and obtained history, as documented above. GCS 15.   I rechecked the patient.  1430: I rechecked the patient. Findings and plan explained to the Patient. Patient discharged home with instructions regarding supportive care, medications, and reasons to return. The importance of close follow-up was reviewed.      Impression & Plan      Medical Decision Making:  Ab Aguilar is a 57 year old male who presents for evaluation of cough, fever and myalgias.  They have other symptoms including headache, nausea, sore throat, and mild abdominal pain.   This is consistent with influenza.   The patient is within the treatment window for  influenza; however, I discussed with patient prescribing Tamiflu, and after risks and benefits discussion, patient elected to defer. Recommended continued supportive care. Patient did have a significant headache but a non focal neurologic exam. He was provided tylenol, ibuprofen, Reglan, benadryl, and compazine, as well as fluids with noted improvement. Patient's mild tachycardia resolved with resolution of fever. At this time, I do feel that patient is appropriate for outpatient management. They are at risk for pneumonia but no signs of this are detected on today's visit.  Close followup of primary care physician is indicated.  Return precautions discussed.  At the time of discharge patient was hemodynamically stable, neurologically intact, symptoms were well controlled, and he was tolerating oral intake.    Diagnosis:    ICD-10-CM    1. Influenza A J10.1 Beta strep group A culture   2. Nonintractable headache, unspecified chronicity pattern, unspecified headache type R51        Disposition:  discharged to home    Discharge Medications:  Discharge Medication List as of 1/16/2018  2:36 PM      START taking these medications    Details   ondansetron (ZOFRAN ODT) 4 MG ODT tab Take 1 tablet (4 mg) by mouth every 6 hours as needed for nausea, Disp-10 tablet, R-0, Local Print               Charlotte Santana  1/16/2018    EMERGENCY DEPARTMENT  I, Charlotte Santana am serving as a scribe at 10:53 AM on 1/16/2018 to document services personally performed by Rick Aguirre DO based on my observations and the provider's statements to me.        Rick Aguirre DO  01/17/18 0804

## 2018-01-16 NOTE — DISCHARGE INSTRUCTIONS
Influenza (Adult)    Influenza is also called the flu. It is a viral illness that affects the air passages of your lungs. It is different from the common cold. The flu can easily be passed from one to person to another. It may be spread through the air by coughing and sneezing. Or it can be spread by touching the sick person and then touching your own eyes, nose, or mouth.  The flu starts 1 to 3 days after you are exposed to the flu virus. It may last for 1 to 2 weeks but many people feel tired or fatigued for many weeks afterward. You usually don t need to take antibiotics unless you have a complication. This might be an ear or sinus infection or pneumonia.  Symptoms of the flu may be mild or severe. They can include extreme tiredness (wanting to stay in bed all day), chills, fevers, muscle aches, soreness with eye movement, headache, and a dry, hacking cough.  Home care  Follow these guidelines when caring for yourself at home:    Avoid being around cigarette smoke, whether yours or other people s.    Acetaminophen or ibuprofen will help ease your fever, muscle aches, and headache. Don t give aspirin to anyone younger than 18 who has the flu. Aspirin can harm the liver.    Nausea and loss of appetite are common with the flu. Eat light meals. Drink 6 to 8 glasses of liquids every day. Good choices are water, sport drinks, soft drinks without caffeine, juices, tea, and soup. Extra fluids will also help loosen secretions in your nose and lungs.    Over-the-counter cold medicines will not make the flu go away faster. But the medicines may help with coughing, sore throat, and congestion in your nose and sinuses. Don t use a decongestant if you have high blood pressure.    Stay home until your fever has been gone for at least 24 hours without using medicine to reduce fever.  Follow-up care  Follow up with your healthcare provider, or as advised, if you are not getting better over the next week.  If you are age 65 or  older, talk with your provider about getting a pneumococcal vaccine every 5 years. You should also get this vaccine if you have chronic asthma or COPD. All adults should get a flu vaccine every fall. Ask your provider about this.  When to seek medical advice  Call your healthcare provider right away if any of these occur:    Cough with lots of colored mucus (sputum) or blood in your mucus    Chest pain, shortness of breath, wheezing, or trouble breathing    Severe headache, or face, neck, or ear pain    New rash with fever    Fever of 100.4 F (38 C) or higher, or as directed by your healthcare provider    Confusion, behavior change, or seizure    Severe weakness or dizziness    You get a new fever or cough after getting better for a few days  Date Last Reviewed: 1/1/2017 2000-2017 [employer]. [address] [place] [address], [place] [address]. All rights reserved. This information is not intended as a substitute for professional medical care. Always follow your healthcare professional's instructions.

## 2018-01-18 ENCOUNTER — TELEPHONE (OUTPATIENT)
Dept: NURSING | Facility: CLINIC | Age: 58
End: 2018-01-18

## 2018-01-18 LAB
BACTERIA SPEC CULT: NORMAL
Lab: NORMAL
SPECIMEN SOURCE: NORMAL

## 2018-01-18 NOTE — TELEPHONE ENCOUNTER
I reviewed the ER notes.    He was diagnosed with influenza, started on Tamiflu.     I dont need to necessarily see him.     I cannot see him today because I am out the rest of the day.   Usually the symptoms will peak about the third or fourth day, then get better.   He should take the Tamiflu as ordered.    *  Take scheduled tylenol 1000 mg every 6-8 hours for the next 3-4 days to control fevers.    *  Take Motrin 600 mg two to four times per day as needed any fevers, body/muscle aches.     *  Be sure to drink plenty of water.  If you are not able to keep down liquids and feel very dehydrated, then you may need to be seen in the ER for IV fluids.      *  Increase your food intake as tolerated, it is most important to maintain good hydration.      *  To prevent spreading the influenza to others, You should stay at home until the fevers have gone and you start to feel better.  You usually are most contagious for the first 3-4 days of this illness.

## 2018-01-18 NOTE — TELEPHONE ENCOUNTER
OK.  He did not necessarily need it.  It just reduces the duration of symptoms from 5-6 days to 3-4 days.  No point in taking it at this time.   Just take the regular tylenol and/or Motrin.   Hydrate well amd make sure to get enoughg rest.     Close encounter if done.

## 2018-01-18 NOTE — TELEPHONE ENCOUNTER
Patient's wife called for her own symptoms but also mentioned (patient) is really sick. Was in ER 1/16. Has the flu. Not getting any better. Keeping fluids down. Has bad HA.  told her he had fever up to 108, unsure if fever now. Has back aches, tired, moaning/groaning. No trouble breathing. Wife does not think he is worse since being in ER. Would you like to see him for f/u?

## 2018-01-18 NOTE — TELEPHONE ENCOUNTER
Informed patient's spouse, stated understanding.      FYI - patient is not taking Tamiflu.  Patient declined prescription in ER.

## 2018-01-24 ENCOUNTER — TRANSFERRED RECORDS (OUTPATIENT)
Dept: HEALTH INFORMATION MANAGEMENT | Facility: CLINIC | Age: 58
End: 2018-01-24

## 2018-01-26 DIAGNOSIS — F51.01 PRIMARY INSOMNIA: ICD-10-CM

## 2018-01-26 RX ORDER — ZOLPIDEM TARTRATE 10 MG/1
TABLET ORAL
Qty: 30 TABLET | Refills: 0 | Status: SHIPPED | OUTPATIENT
Start: 2018-01-26 | End: 2018-03-02

## 2018-01-26 NOTE — TELEPHONE ENCOUNTER
Controlled Substance Refill Request for zolpidem 10mg  Problem List Complete:  Yes    Last Written Prescription Date:  12/21/17  Last Fill Quantity: 30,   # refills: 0    Last Office Visit with [facility] primary care provider: 11/08/17    Clinic visit frequency required: Q 3 months     Future Office visit: 0    Controlled substance agreement on file: No.     Processing:  Fax Rx to [facility] [id number] [facility]   checked in past 6 months?

## 2018-02-11 DIAGNOSIS — I10 ESSENTIAL HYPERTENSION, BENIGN: ICD-10-CM

## 2018-02-11 DIAGNOSIS — E78.5 HYPERLIPIDEMIA LDL GOAL <130: ICD-10-CM

## 2018-02-11 NOTE — TELEPHONE ENCOUNTER
"Requested Prescriptions   Pending Prescriptions Disp Refills     metoprolol succinate (TOPROL-XL) 25 MG 24 hr tablet [Pharmacy Med Name: METOPROLOL ER SUCCINATE 25MG TABS]  Last Written Prescription Date:  11/8/2017  Last Fill Quantity: 90 tablet,  # refills: 0   Last Office Visit: 11/8/2017   Future Office Visit:    90 tablet 0     Sig: TAKE 1 TABLET(25 MG) BY MOUTH DAILY    Beta-Blockers Protocol Passed    2/11/2018  8:58 AM       Passed - Blood pressure under 140/90    BP Readings from Last 3 Encounters:   01/16/18 123/83   11/08/17 150/82   06/28/17 (!) 170/100                Passed - Patient is age 6 or older       Passed - Recent or future visit with authorizing provider's specialty    Patient had office visit in the last year or has a visit in the next 30 days with authorizing provider.  See \"Patient Info\" tab in inbasket, or \"Choose Columns\" in Meds & Orders section of the refill encounter.             lisinopril (PRINIVIL/ZESTRIL) 40 MG tablet [Pharmacy Med Name: LISINOPRIL 40MG TABLETS]  Last Written Prescription Date:  2/15/2017  Last Fill Quantity: 90 tablet,  # refills: 3   Last Office Visit: 11/8/2017   Future Office Visit:      90 tablet 0     Sig: TAKE 1 TABLET(40 MG) BY MOUTH DAILY    ACE Inhibitors (Including Combos) Protocol Passed    2/11/2018  8:58 AM       Passed - Blood pressure under 140/90    BP Readings from Last 3 Encounters:   01/16/18 123/83   11/08/17 150/82   06/28/17 (!) 170/100                Passed - Recent or future visit with authorizing provider's specialty    Patient had office visit in the last year or has a visit in the next 30 days with authorizing provider.  See \"Patient Info\" tab in inbasket, or \"Choose Columns\" in Meds & Orders section of the refill encounter.            Passed - Patient is age 18 or older       Passed - Normal serum creatinine on file in past 12 months    Recent Labs   Lab Test  11/08/17   0914   CR  0.74            Passed - Normal serum potassium on file " "in past 12 months    Recent Labs   Lab Test  11/08/17   0914   POTASSIUM  4.0             pravastatin (PRAVACHOL) 40 MG tablet [Pharmacy Med Name: PRAVASTATIN 40MG TABLETS]  Last Written Prescription Date:  2/15/2017  Last Fill Quantity: 90 tablet,  # refills: 3   Last Office Visit: 11/8/2017   Future Office Visit:    90 tablet 0     Sig: TAKE 1 TABLET(40 MG) BY MOUTH AT BEDTIME    Statins Protocol Passed    2/11/2018  8:58 AM       Passed - LDL on file in past 12 months    Recent Labs   Lab Test  02/14/17   0852   LDL  111*            Passed - No abnormal creatine kinase in past 12 months    No lab results found.         Passed - Recent or future visit with authorizing provider    Patient had office visit in the last year or has a visit in the next 30 days with authorizing provider.  See \"Patient Info\" tab in inbasket, or \"Choose Columns\" in Meds & Orders section of the refill encounter.            Passed - Patient is age 18 or older          "

## 2018-02-11 NOTE — LETTER
[facility]  [address] [id number] [phone]            Ab Aguilar  [address]  [facility] [address]        February 13, 2018    Dear Ab,    While refilling your prescription today, we noticed that you are due to have labs drawn.  We will refill your prescription for 30 days, but a follow-up appointment must be made before any additional refills can be approved.     Taking care of your health is important to us and we look forward to seeing you in the near future.  Please call us at [phone] or [phone] (or use [employer]) to schedule an appointment.     Please disregard this notice if you have already made an appointment.    Sincerely,        [facility]

## 2018-02-13 RX ORDER — METOPROLOL SUCCINATE 25 MG/1
TABLET, EXTENDED RELEASE ORAL
Qty: 90 TABLET | Refills: 2 | Status: SHIPPED | OUTPATIENT
Start: 2018-02-13 | End: 2018-03-12

## 2018-02-13 RX ORDER — PRAVASTATIN SODIUM 40 MG
TABLET ORAL
Qty: 30 TABLET | Refills: 0 | Status: SHIPPED | OUTPATIENT
Start: 2018-02-13 | End: 2018-03-12

## 2018-02-13 RX ORDER — LISINOPRIL 40 MG/1
TABLET ORAL
Qty: 90 TABLET | Refills: 2 | Status: SHIPPED | OUTPATIENT
Start: 2018-02-13 | End: 2018-08-28

## 2018-03-02 DIAGNOSIS — F51.01 PRIMARY INSOMNIA: ICD-10-CM

## 2018-03-02 RX ORDER — ZOLPIDEM TARTRATE 10 MG/1
TABLET ORAL
Qty: 30 TABLET | Refills: 0 | Status: SHIPPED | OUTPATIENT
Start: 2018-03-02 | End: 2018-04-02

## 2018-03-02 NOTE — TELEPHONE ENCOUNTER
Requested Prescriptions   Pending Prescriptions Disp Refills     zolpidem (AMBIEN) 10 MG tablet 30 tablet 0     Sig: TAKE 1/2 OR 1 TABLET BY MOUTH AT BEDTIME AS NEEDED FOR SLEEP    There is no refill protocol information for this order      Controlled Substance Refill Request for zolpidem ambien 10mg  Problem List Complete:  Yes    Last Written Prescription Date:  01/26/18  Last Fill Quantity: 30,   # refills: 0    Last Office Visit with [facility] primary care provider: 11/08/17    Clinic visit frequency required: Q 3 months     Future Office visit: 03/12/18  Next 5 appointments (look out 90 days)     Mar 12, 2018  2:20 PM CDT   Office Visit with Shayne Suggs MD   [facility] ([facility])    [address] [address] [phone]                  Controlled substance agreement on file: No.     Processing:  Fax Rx to [facility] [facility]   checked in past 6 months?  No, route to RN

## 2018-03-06 DIAGNOSIS — E78.5 HYPERLIPIDEMIA LDL GOAL <130: ICD-10-CM

## 2018-03-06 LAB
CHOLEST SERPL-MCNC: 189 MG/DL
HDLC SERPL-MCNC: 46 MG/DL
LDLC SERPL CALC-MCNC: 122 MG/DL
NONHDLC SERPL-MCNC: 143 MG/DL
TRIGL SERPL-MCNC: 106 MG/DL

## 2018-03-06 PROCEDURE — 36415 COLL VENOUS BLD VENIPUNCTURE: CPT | Performed by: INTERNAL MEDICINE

## 2018-03-06 PROCEDURE — 80061 LIPID PANEL: CPT | Performed by: INTERNAL MEDICINE

## 2018-03-12 ENCOUNTER — OFFICE VISIT (OUTPATIENT)
Dept: INTERNAL MEDICINE | Facility: CLINIC | Age: 58
End: 2018-03-12
Payer: COMMERCIAL

## 2018-03-12 VITALS
SYSTOLIC BLOOD PRESSURE: 148 MMHG | BODY MASS INDEX: 38.25 KG/M2 | RESPIRATION RATE: 16 BRPM | OXYGEN SATURATION: 97 % | DIASTOLIC BLOOD PRESSURE: 98 MMHG | WEIGHT: 267.2 LBS | HEIGHT: 70 IN | HEART RATE: 82 BPM | TEMPERATURE: 98 F

## 2018-03-12 DIAGNOSIS — Z12.11 SCREEN FOR COLON CANCER: Primary | ICD-10-CM

## 2018-03-12 DIAGNOSIS — E78.5 HYPERLIPIDEMIA LDL GOAL <130: ICD-10-CM

## 2018-03-12 DIAGNOSIS — I10 ESSENTIAL HYPERTENSION, BENIGN: ICD-10-CM

## 2018-03-12 PROCEDURE — 99213 OFFICE O/P EST LOW 20 MIN: CPT | Performed by: INTERNAL MEDICINE

## 2018-03-12 RX ORDER — PRAVASTATIN SODIUM 40 MG
40 TABLET ORAL DAILY
Qty: 90 TABLET | Refills: 3 | Status: SHIPPED | OUTPATIENT
Start: 2018-03-12 | End: 2018-12-20

## 2018-03-12 RX ORDER — METOPROLOL SUCCINATE 25 MG/1
50 TABLET, EXTENDED RELEASE ORAL DAILY
Qty: 90 TABLET | Refills: 0
Start: 2018-03-12 | End: 2018-03-27

## 2018-03-12 NOTE — PROGRESS NOTES
SUBJECTIVE:   Ab Aguilar is a 57 year old male who presents to clinic today for the following health issues:      Hyperlipidemia Follow-Up      Rate your low fat/cholesterol diet?: good    Taking statin?  Yes, no muscle aches from statin    Other lipid medications/supplements?:  none    Hypertension Follow-up      Outpatient blood pressures are not being checked.    Low Salt Diet: no added salt      Amount of exercise or physical activity: None    Problems taking medications regularly: No    Medication side effects: none    Diet: low salt and low fat/cholesterol            Problem list and histories reviewed & adjusted, as indicated.  Additional history: as documented        Reviewed and updated as needed this visit by clinical staff       Reviewed and updated as needed this visit by Provider           Past Medical History:  ---------------------------  Past Medical History:   Diagnosis Date     Esophageal reflux      Essential hypertension, benign      Major depression      Obesity      Other and unspecified hyperlipidemia      Prediabetes 9/26/12    A1C 6.1       Past Surgical History:  ---------------------------  Past Surgical History:   Procedure Laterality Date     C NONSPECIFIC PROCEDURE  1991    R ankle fracture, repair     C NONSPECIFIC PROCEDURE  age 6    B inguinal hernia repair     C NONSPECIFIC PROCEDURE  9/01    lumbar laminectomy     STRESS ECHO (METRO)  10/02    normal      STRESS ECHO (METRO)  9/05    normal stress echo       Current Medications:  ---------------------------  Current Outpatient Prescriptions   Medication Sig Dispense Refill     pravastatin (PRAVACHOL) 40 MG tablet Take 1 tablet (40 mg) by mouth daily 90 tablet 3     metoprolol succinate (TOPROL-XL) 25 MG 24 hr tablet Take 2 tablets (50 mg) by mouth daily 90 tablet 0     zolpidem (AMBIEN) 10 MG tablet TAKE 1/2 OR 1 TABLET BY MOUTH AT BEDTIME AS NEEDED FOR SLEEP 30 tablet 0     lisinopril (PRINIVIL/ZESTRIL) 40 MG tablet TAKE  1 TABLET(40 MG) BY MOUTH DAILY 90 tablet 2     sucralfate (CARAFATE) 1 GM tablet TAKE 1 TABLET(1 GRAM) BY MOUTH FOUR TIMES DAILY AS NEEDED 100 tablet 3     omeprazole (PRILOSEC) 20 MG CR capsule Take 2 capsules (40 mg) by mouth daily 60 capsule 11     ibuprofen (ADVIL,MOTRIN) 200 MG tablet Take 800 mg by mouth 3 times daily as needed.       ASPIRIN 81 MG OR TABS ONE DAILY       MULTIVITAMINS OR TABS          Allergies:  -------------  Allergies   Allergen Reactions     Atorvastatin GI Disturbance     Abdominal and intestinal pains from atorvastatin       Social History:  -------------------  Social History     Social History     Marital status:      Spouse name: N/A     Number of children: N/A     Years of education: N/A     Occupational History     Not on file.     Social History Main Topics     Smoking status: Never Smoker     Smokeless tobacco: Never Used     Alcohol use Yes      Comment: 12-18 q week BEER     Drug use: No     Sexual activity: Yes     Partners: Female     Other Topics Concern     Caffeine Concern No     Social History Narrative       Family Medical History:  ------------------------------  Family History   Problem Relation Age of Onset     Hypertension Father      HEART DISEASE Brother 37     b. 1967, 1st MI at age 37     Family History Negative Mother      Family History Negative Brother      b. 1965     Family History Negative Sister      b. 1961         ROS:  REVIEW OF SYSTEMS:    RESP: negative for cough, dyspnea, wheezing, hemoptysis  CV: negative for chest pain, palpitations, PND, MOSCOSO, orthopnea; reports no changes in their ability to perform physical activity (from cardiovascular standpoint)  GI: negative for dysphagia, N/V, pain, melena, diarrhea and constipation  NEURO: negative for numbness/tingling, paralysis, incoordination, or focal weakness     OBJECTIVE:                                                    BP (!) 148/98  Pulse 82  Temp 98  F (36.7  C) (Oral)  Resp 16  Ht  "5' 10\" (1.778 m)  Wt 267 lb 3.2 oz (121.2 kg)  SpO2 97%  BMI 38.34 kg/m2     GENERAL alert and no distress  EYES:  Normal sclera,conjunctiva, EOMI  HENT: oral and posterior pharynx without lesions or erythema, facies symmetric  NECK: Neck supple. No LAD, without thyroidmegaly or JVD., Carotids without bruits.  RESP: Clear to ausculation bilaterally without wheezes or crackles. Normal BS in all fields.  CV: RRR normal S1S2 without murmurs, rubs or gallops. PMI normal  LYMPH: no cervical lymph adenopathy appreciated  MS: extremities- no gross deformities of the visible extremities noted, no edema  PSYCH: Alert and oriented times 3; speech- coherent  SKIN:  No obvious significant skin lesions on visible portions of face          ASSESSMENT/PLAN:                                                      (Z12.11) Screen for colon cancer  (primary encounter diagnosis)  Comment:   Plan:     (E78.5) Hyperlipidemia LDL goal <130  Comment:   Plan: pravastatin (PRAVACHOL) 40 MG tablet            (I10) Essential hypertension, benign  Comment:   Plan: metoprolol succinate (TOPROL-XL) 25 MG 24 hr         tablet            *  Blood pressure needs a little better control.     *  Increase the Metoprolol to 50 mg once per day.   (Take 2 x 25 mg tablets)    *  Continue lisinopril 40 mg once per day    *  Contact me with an update on the blood pressure readings in a month or two (when you are out of your current bottle).    *  Continue all other medications at the same doses.  Contact your usual pharmacy if you need refills.     *  Start the oral over the counter iron supplement one month before the planned knee replacement.     *  Try reducing the water intake during the daytime to no more than 6 glasses per day.            See Patient Instructions    FRANK BUTLER M.D., MD  [facility]   "

## 2018-03-12 NOTE — PATIENT INSTRUCTIONS
*  Blood pressure needs a little better control.     *  Increase the Metoprolol to 50 mg once per day.   (Take 2 x 25 mg tablets)    *  Continue lisinopril 40 mg once per day    *  Contact me with an update on the blood pressure readings in a month or two (when you are out of your current bottle).    *  Continue all other medications at the same doses.  Contact your usual pharmacy if you need refills.     *  Start the oral over the counter iron supplement one month before the planned knee replacement.     *  Try reducing the water intake during the daytime to no more than 6 glasses per day.

## 2018-03-12 NOTE — MR AVS SNAPSHOT
After Visit Summary   3/12/2018    Ab Aguilar    MRN: [id number]           Patient Information     Date Of Birth          1960        Visit Information        Provider Department      3/12/2018 2:20 PM Shayne Suggs MD [facility]        Today's Diagnoses     Screen for colon cancer    -  1    Hyperlipidemia LDL goal <130        Essential hypertension, benign          Care Instructions    *  Blood pressure needs a little better control.     *  Increase the Metoprolol to 50 mg once per day.   (Take 2 x 25 mg tablets)    *  Continue lisinopril 40 mg once per day    *  Contact me with an update on the blood pressure readings in a month or two (when you are out of your current bottle).    *  Continue all other medications at the same doses.  Contact your usual pharmacy if you need refills.     *  Start the oral over the counter iron supplement one month before the planned knee replacement.     *  Try reducing the water intake during the daytime to no more than 6 glasses per day.              Follow-ups after your visit        Who to contact     If you have questions or need follow up information about today's clinic visit or your schedule please contact [facility] directly at [phone].  Normal or non-critical lab and imaging results will be communicated to you by MyChart, letter or phone within 4 business days after the clinic has received the results. If you do not hear from us within 7 days, please contact the clinic through [employer] or phone. If you have a critical or abnormal lab result, we will notify you by phone as soon as possible.  Submit refill requests through [employer] or call your pharmacy and they will forward the refill request to us. Please allow 3 business days for your refill to be completed.          Additional Information About Your Visit        MyChart Information     [employer] gives you secure access to your  "electronic health record. If you see a primary care provider, you can also send messages to your care team and make appointments. If you have questions, please call your primary care clinic.  If you do not have a primary care provider, please call [phone] and they will assist you.        Care EveryWhere ID     This is your Care EveryWhere ID. This could be used by other organizations to access your [facility] medical records  [id number]        Your Vitals Were     Pulse Temperature Respirations Height Pulse Oximetry BMI (Body Mass Index)    82 98  F (36.7  C) (Oral) 16 5' 10\" (1.778 m) 97% 38.34 kg/m2       Blood Pressure from Last 3 Encounters:   03/12/18 (!) 148/98   01/16/18 123/83   11/08/17 150/82    Weight from Last 3 Encounters:   03/12/18 267 lb 3.2 oz (121.2 kg)   11/08/17 264 lb 14.4 oz (120.2 kg)   06/28/17 255 lb 14.4 oz (116.1 kg)              Today, you had the following     No orders found for display         Today's Medication Changes          These changes are accurate as of 3/12/18  3:24 PM.  If you have any questions, ask your nurse or doctor.               These medicines have changed or have updated prescriptions.        Dose/Directions    metoprolol succinate 25 MG 24 hr tablet   Commonly known as:  TOPROL-XL   This may have changed:  See the new instructions.   Used for:  Essential hypertension, benign   Changed by:  Shayne Suggs MD        Dose:  50 mg   Take 2 tablets (50 mg) by mouth daily   Quantity:  90 tablet   Refills:  0       pravastatin 40 MG tablet   Commonly known as:  PRAVACHOL   This may have changed:  See the new instructions.   Used for:  Hyperlipidemia LDL goal <130   Changed by:  Shayne Suggs MD        Dose:  40 mg   Take 1 tablet (40 mg) by mouth daily   Quantity:  90 tablet   Refills:  3            Where to get your medicines      These medications were sent to [facility] [address] [place], [place] - [address] AT [facility] [address]  [facility] [address]    Hours:  24-hours Phone:  [phone]     pravastatin 40 MG tablet         Some of these will need a paper prescription and others can be bought over the counter.  Ask your nurse if you have questions.     You don't need a prescription for these medications     metoprolol succinate 25 MG 24 hr tablet                Primary Care Provider Office Phone # Fax #    Shayne Suggs [phone]       [address] [facility] [address]        Equal Access to Services     [facility]: Hadii aad ku hadasho Soomaali, waaxda luqadaha, qaybta kaalmada adeegyada, waxay carlitos hayjerryn anthony dowd . So [facility] [phone].    ATENCIÓN: Si habla español, tiene a mcclure disposición servicios gratuitos de asistencia lingüística. [facility] [phone].    We comply with applicable federal civil rights laws and [place] laws. We do not discriminate on the basis of race, color, national origin, age, disability, sex, sexual orientation, or gender identity.            Thank you!     Thank you for choosing [facility]  for your care. Our goal is always to provide you with excellent care. Hearing back from our patients is one way we can continue to improve our services. Please take a few minutes to complete the written survey that you may receive in the mail after your visit with us. Thank you!             Your Updated Medication List - Protect others around you: Learn how to safely use, store and throw away your medicines at www.disposemymeds.org.          This list is accurate as of 3/12/18  3:24 PM.  Always use your most recent med list.                   Brand Name Dispense Instructions for use Diagnosis    aspirin 81 MG tablet      ONE DAILY    Essential hypertension, benign, Other and unspecified hyperlipidemia       ibuprofen 200 MG tablet    ADVIL/MOTRIN     Take 800 mg by mouth 3 times daily as needed.        lisinopril 40  MG tablet    PRINIVIL/ZESTRIL    90 tablet    TAKE 1 TABLET(40 MG) BY MOUTH DAILY    Essential hypertension, benign       metoprolol succinate 25 MG 24 hr tablet    TOPROL-XL    90 tablet    Take 2 tablets (50 mg) by mouth daily    Essential hypertension, benign       multivitamin per tablet           omeprazole 20 MG CR capsule    priLOSEC    60 capsule    Take 2 capsules (40 mg) by mouth daily    Gastroesophageal reflux disease without esophagitis       pravastatin 40 MG tablet    PRAVACHOL    90 tablet    Take 1 tablet (40 mg) by mouth daily    Hyperlipidemia LDL goal <130       sucralfate 1 GM tablet    CARAFATE    100 tablet    TAKE 1 TABLET(1 GRAM) BY MOUTH FOUR TIMES DAILY AS NEEDED    Gastroesophageal reflux disease with esophagitis       zolpidem 10 MG tablet    AMBIEN    30 tablet    TAKE 1/2 OR 1 TABLET BY MOUTH AT BEDTIME AS NEEDED FOR SLEEP    Primary insomnia

## 2018-03-24 DIAGNOSIS — K21.00 GASTROESOPHAGEAL REFLUX DISEASE WITH ESOPHAGITIS: ICD-10-CM

## 2018-03-24 NOTE — TELEPHONE ENCOUNTER
"Requested Prescriptions   Pending Prescriptions Disp Refills     sucralfate (CARAFATE) 1 GM tablet [Pharmacy Med Name: SUCRALFATE 1GM TABLETS] 100 tablet 0    Last Written Prescription Date:  10/12/17  Last Fill Quantity: 100 tab,  # refills: 3   Last office visit: 3/12/2018 with prescribing provider:  03/12/18   Future Office Visit:     Sig: TAKE 1 TABLET(1 GRAM) BY MOUTH FOUR TIMES DAILY AS NEEDED    Miscellaneous Gastrointestinal Agents Passed    3/24/2018  3:13 AM       Passed - Recent (12 mo) or future (30 days) visit within the authorizing provider's specialty    Patient had office visit in the last 12 months or has a visit in the next 30 days with authorizing provider or within the authorizing provider's specialty.  See \"Patient Info\" tab in inbasket, or \"Choose Columns\" in Meds & Orders section of the refill encounter.           Passed - Patient is 18 years of age or older          "

## 2018-03-27 RX ORDER — SUCRALFATE 1 G/1
TABLET ORAL
Qty: 100 TABLET | Refills: 3 | Status: SHIPPED | OUTPATIENT
Start: 2018-03-27 | End: 2018-07-07

## 2018-04-02 DIAGNOSIS — F51.01 PRIMARY INSOMNIA: ICD-10-CM

## 2018-04-02 RX ORDER — ZOLPIDEM TARTRATE 10 MG/1
TABLET ORAL
Qty: 30 TABLET | Refills: 0 | Status: SHIPPED | OUTPATIENT
Start: 2018-04-02 | End: 2018-04-30

## 2018-04-02 NOTE — TELEPHONE ENCOUNTER
Last Written Prescription Date:  3/2/18  Last Fill Quantity: 30,  # refills: 0   Last office visit: 3/12/2018 with prescribing provider:  3/12/18   Future Office Visit:

## 2018-05-02 ENCOUNTER — TRANSFERRED RECORDS (OUTPATIENT)
Dept: HEALTH INFORMATION MANAGEMENT | Facility: CLINIC | Age: 58
End: 2018-05-02

## 2018-05-30 DIAGNOSIS — F51.01 PRIMARY INSOMNIA: ICD-10-CM

## 2018-05-30 RX ORDER — ZOLPIDEM TARTRATE 10 MG/1
5-10 TABLET ORAL
Qty: 30 TABLET | Refills: 0 | Status: SHIPPED | OUTPATIENT
Start: 2018-05-30 | End: 2018-06-23

## 2018-05-30 NOTE — TELEPHONE ENCOUNTER
zolpidem (AMBIEN) 10 MG tablet      Last Written Prescription Date:  5/01/2018  Last Fill Quantity: 30,   # refills: 0  Last Office Visit: 3/12/2018  Future Office visit:       Routing refill request to provider for review/approval because:  Drug not on the [facility], [facility] or [facility] refill protocol or controlled substance

## 2018-06-23 DIAGNOSIS — F51.01 PRIMARY INSOMNIA: ICD-10-CM

## 2018-06-23 NOTE — TELEPHONE ENCOUNTER
Controlled Substance Refill Request for zolpidem (AMBIEN) 10 MG tablet  Problem List Complete:  No     PROVIDER TO CONSIDER COMPLETION OF PROBLEM LIST AND OVERVIEW/CONTROLLED SUBSTANCE AGREEMENT    Last Written Prescription Date:  5/30/18  Last Fill Quantity: 30 TABLET,   # refills: 0    Last Office Visit with CINTHYA primary care provider: 03/12/2018 WITH CARRIE Liang Office visit:     Controlled substance agreement on file: No.     Processing:  Fax Rx to [facility] pharmacy   checked in past 6 months?  Yes 03/12/2018

## 2018-06-25 RX ORDER — ZOLPIDEM TARTRATE 10 MG/1
5-10 TABLET ORAL
Qty: 30 TABLET | Refills: 0 | Status: SHIPPED | OUTPATIENT
Start: 2018-06-25 | End: 2018-07-18

## 2018-07-07 DIAGNOSIS — K21.00 GASTROESOPHAGEAL REFLUX DISEASE WITH ESOPHAGITIS: ICD-10-CM

## 2018-07-07 NOTE — TELEPHONE ENCOUNTER
"Requested Prescriptions   Pending Prescriptions Disp Refills     sucralfate (CARAFATE) 1 GM tablet  Last Written Prescription Date:  03/27/2018  Last Fill Quantity: 100,  # refills: 3   Last office visit: 3/12/2018 with prescribing provider:  CARRIE   Future Office Visit:     100 tablet 3    Miscellaneous Gastrointestinal Agents Passed    7/7/2018  4:09 PM       Passed - Recent (12 mo) or future (30 days) visit within the authorizing provider's specialty    Patient had office visit in the last 12 months or has a visit in the next 30 days with authorizing provider or within the authorizing provider's specialty.  See \"Patient Info\" tab in inbasket, or \"Choose Columns\" in Meds & Orders section of the refill encounter.           Passed - Patient is 18 years of age or older          "

## 2018-07-07 NOTE — TELEPHONE ENCOUNTER
NOTE: PATIENT REQUESTS NEW RX. PATIENT TRANSFERRED PRESCRIPTIONS TO [facility] AND WOULD LIKE A NEW FILL ON SUCRALFATE TABS IF STILL USING THEM.

## 2018-07-10 RX ORDER — SUCRALFATE 1 G/1
TABLET ORAL
Qty: 100 TABLET | Refills: 3 | Status: SHIPPED | OUTPATIENT
Start: 2018-07-10 | End: 2018-09-06

## 2018-07-12 DIAGNOSIS — K21.00 GASTROESOPHAGEAL REFLUX DISEASE WITH ESOPHAGITIS: ICD-10-CM

## 2018-07-12 RX ORDER — SUCRALFATE 1 G/1
TABLET ORAL
Qty: 100 TABLET | Refills: 3 | Status: SHIPPED | OUTPATIENT
Start: 2018-07-12 | End: 2019-03-05

## 2018-07-12 NOTE — TELEPHONE ENCOUNTER
"Requested Prescriptions   Pending Prescriptions Disp Refills     sucralfate (CARAFATE) 1 GM tablet [Pharmacy Med Name: SUCRALFATE 1GM TABLETS] 100 tablet 0     Sig: TAKE 1 TABLET(1 GRAM) BY MOUTH FOUR TIMES DAILY AS NEEDED    Miscellaneous Gastrointestinal Agents Passed    7/12/2018  3:14 AM       Passed - Recent (12 mo) or future (30 days) visit within the authorizing provider's specialty    Patient had office visit in the last 12 months or has a visit in the next 30 days with authorizing provider or within the authorizing provider's specialty.  See \"Patient Info\" tab in inbasket, or \"Choose Columns\" in Meds & Orders section of the refill encounter.           Passed - Patient is 18 years of age or older        Prescription approved per [facility] Refill Protocol.  Requesting to different pharm.  "

## 2018-07-18 DIAGNOSIS — F51.01 PRIMARY INSOMNIA: ICD-10-CM

## 2018-07-18 RX ORDER — ZOLPIDEM TARTRATE 10 MG/1
5-10 TABLET ORAL
Qty: 30 TABLET | Refills: 0 | Status: SHIPPED | OUTPATIENT
Start: 2018-07-25 | End: 2018-08-14

## 2018-07-18 NOTE — TELEPHONE ENCOUNTER
Requested Prescriptions   Pending Prescriptions Disp Refills     zolpidem (AMBIEN) 10 MG tablet 30 tablet 0     Sig: Take 0.5-1 tablets (5-10 mg) by mouth nightly as needed for sleep    There is no refill protocol information for this order        Last Written Prescription Date:  6/25/18  Last Fill Quantity: 30,  # refills: 0   Last office visit: 3/12/2018 with prescribing provider:  3/12/18   Future Office Visit:

## 2018-07-19 NOTE — TELEPHONE ENCOUNTER
rx zolpidem ambien 10mg tablet faxed to [facility] [address] FAX: [phone]

## 2018-08-28 DIAGNOSIS — I10 ESSENTIAL HYPERTENSION, BENIGN: ICD-10-CM

## 2018-08-28 RX ORDER — LISINOPRIL 40 MG/1
TABLET ORAL
Qty: 90 TABLET | Refills: 1 | Status: SHIPPED | OUTPATIENT
Start: 2018-08-28 | End: 2018-12-20

## 2018-08-28 NOTE — TELEPHONE ENCOUNTER
Requested Prescriptions   Pending Prescriptions Disp Refills     lisinopril (PRINIVIL/ZESTRIL) 40 MG tablet 90 tablet 2    There is no refill protocol information for this order      Last Written Prescription Date:  02/13/18  Last Fill Quantity: 90,  # refills: 2   Last office visit: 3/12/2018 with prescribing provider:  03/12/18   Future Office Visit:  0

## 2018-08-28 NOTE — TELEPHONE ENCOUNTER
"Requested Prescriptions   Pending Prescriptions Disp Refills     lisinopril (PRINIVIL/ZESTRIL) 40 MG tablet 90 tablet 2    ACE Inhibitors (Including Combos) Protocol Failed    8/28/2018 10:10 AM       Failed - Blood pressure under 140/90 in past 12 months    BP Readings from Last 3 Encounters:   03/12/18 (!) 148/98   01/16/18 123/83   11/08/17 150/82                Passed - Recent (12 mo) or future (30 days) visit within the authorizing provider's specialty    Patient had office visit in the last 12 months or has a visit in the next 30 days with authorizing provider or within the authorizing provider's specialty.  See \"Patient Info\" tab in inbasket, or \"Choose Columns\" in Meds & Orders section of the refill encounter.           Passed - Patient is age 18 or older       Passed - Normal serum creatinine on file in past 12 months    Recent Labs   Lab Test  11/08/17   0914   CR  0.74            Passed - Normal serum potassium on file in past 12 months    Recent Labs   Lab Test  11/08/17   0914   POTASSIUM  4.0             Routing refill request to provider for review/approval because:   out of range:  blood pressure          "

## 2018-09-06 DIAGNOSIS — K21.00 GASTROESOPHAGEAL REFLUX DISEASE WITH ESOPHAGITIS: ICD-10-CM

## 2018-09-06 RX ORDER — SUCRALFATE 1 G/1
TABLET ORAL
Qty: 100 TABLET | Refills: 1 | Status: SHIPPED | OUTPATIENT
Start: 2018-09-06 | End: 2018-10-05

## 2018-09-06 NOTE — TELEPHONE ENCOUNTER
Requested Prescriptions   Pending Prescriptions Disp Refills     sucralfate (CARAFATE) 1 GM tablet  Last Written Prescription Date:  07/12/2018  Last Fill Quantity: 100,  # refills: 03   Last Office Visit: 3/12/2018   Future Office Visit:      100 tablet 3     Sig: TAKE 1 TABLET(1 GRAM) BY MOUTH FOUR TIMES DAILY AS NEEDED    There is no refill protocol information for this order

## 2018-09-06 NOTE — TELEPHONE ENCOUNTER
Prescription approved per [facility] Refill Protocol.

## 2018-09-06 NOTE — TELEPHONE ENCOUNTER
"Requested Prescriptions   Pending Prescriptions Disp Refills     sucralfate (CARAFATE) 1 GM tablet  Last Written Prescription Date:  07/12/2018  Last Fill Quantity: 100,  # refills: 03   Last Office Visit: 3/12/2018   Future Office Visit:      100 tablet 3     Sig: TAKE 1 TABLET(1 GRAM) BY MOUTH FOUR TIMES DAILY AS NEEDED    Miscellaneous Gastrointestinal Agents Passed    9/6/2018  9:37 AM       Passed - Recent (12 mo) or future (30 days) visit within the authorizing provider's specialty    Patient had office visit in the last 12 months or has a visit in the next 30 days with authorizing provider or within the authorizing provider's specialty.  See \"Patient Info\" tab in inbasket, or \"Choose Columns\" in Meds & Orders section of the refill encounter.           Passed - Patient is 18 years of age or older          "

## 2018-10-05 DIAGNOSIS — K21.00 GASTROESOPHAGEAL REFLUX DISEASE WITH ESOPHAGITIS: ICD-10-CM

## 2018-10-05 NOTE — TELEPHONE ENCOUNTER
"PHARMACY REQUESTING A 90 DAY SUPPLY PER INSURANCE.    Requested Prescriptions   Pending Prescriptions Disp Refills     sucralfate (CARAFATE) 1 GM tablet  PHARMACY REQUESTING 90 DAY SUPPLY.  Last Written Prescription Date:  9/6/18  Last Fill Quantity: 100 TABLET,  # refills: 1   Last office visit: 3/12/2018 with prescribing provider:  CARRIE   Future Office Visit:     100 tablet 1     Sig: TAKE 1 TABLET(1 GRAM) BY MOUTH FOUR TIMES DAILY AS NEEDED    Miscellaneous Gastrointestinal Agents Passed    10/5/2018  5:41 PM       Passed - Recent (12 mo) or future (30 days) visit within the authorizing provider's specialty    Patient had office visit in the last 12 months or has a visit in the next 30 days with authorizing provider or within the authorizing provider's specialty.  See \"Patient Info\" tab in inbasket, or \"Choose Columns\" in Meds & Orders section of the refill encounter.           Passed - Patient is 18 years of age or older          "

## 2018-10-08 RX ORDER — SUCRALFATE 1 G/1
TABLET ORAL
Qty: 360 TABLET | Refills: 1 | Status: SHIPPED | OUTPATIENT
Start: 2018-10-08 | End: 2019-03-05

## 2018-10-16 ENCOUNTER — ALLIED HEALTH/NURSE VISIT (OUTPATIENT)
Dept: NURSING | Facility: CLINIC | Age: 58
End: 2018-10-16
Payer: COMMERCIAL

## 2018-10-16 DIAGNOSIS — Z23 NEED FOR PROPHYLACTIC VACCINATION AND INOCULATION AGAINST INFLUENZA: Primary | ICD-10-CM

## 2018-10-16 PROCEDURE — 90682 RIV4 VACC RECOMBINANT DNA IM: CPT

## 2018-10-16 PROCEDURE — 90471 IMMUNIZATION ADMIN: CPT

## 2018-10-16 NOTE — MR AVS SNAPSHOT
After Visit Summary   10/16/2018    Ab Aguilar    MRN: [id number]           Patient Information     Date Of Birth          1960        Visit Information        Provider Department      10/16/2018 3:40 PM [facility] - NURSE [facility]        Today's Diagnoses     Need for prophylactic vaccination and inoculation against influenza    -  1       Follow-ups after your visit        Who to contact     If you have questions or need follow up information about today's clinic visit or your schedule please contact [facility] directly at [phone].  Normal or non-critical lab and imaging results will be communicated to you by Collaberahart, letter or phone within 4 business days after the clinic has received the results. If you do not hear from us within 7 days, please contact the clinic through Supremext or phone. If you have a critical or abnormal lab result, we will notify you by phone as soon as possible.  Submit refill requests through Wingu or call your pharmacy and they will forward the refill request to us. Please allow 3 business days for your refill to be completed.          Additional Information About Your Visit        MyChart Information     Wingu gives you secure access to your electronic health record. If you see a primary care provider, you can also send messages to your care team and make appointments. If you have questions, please call your primary care clinic.  If you do not have a primary care provider, please call [phone] and they will assist you.        Care EveryWhere ID     This is your Care EveryWhere ID. This could be used by other organizations to access your [facility] medical records  [id number]         Blood Pressure from Last 3 Encounters:   03/12/18 (!) 148/98   01/16/18 123/83   11/08/17 150/82    Weight from Last 3 Encounters:   03/12/18 267 lb 3.2 oz (121.2 kg)   11/08/17 264 lb 14.4 oz (120.2 kg)   06/28/17 255  lb 14.4 oz (116.1 kg)              We Performed the Following     FLU VACCINE, (RIV4) RECOMBINANT HA  , IM (FluBlok, egg free) [94300]- >18 YRS ([facility] recommended  50-64 YRS)     Vaccine Administration, Initial [49071]        Primary Care Provider Office Phone # Fax #    Shayne Dixon Suggs [phone]       [address] [facility] [address]        Equal Access to Services     GARRISON DEVI : Hadii aad ku hadasho Soomaali, waaxda luqadaha, qaybta kaalmada adeegyada, waxay idiin hayaan adeeg kharanathanael la'oamri . So [facility] [phone].    ATENCIÓN: Si habla español, tiene a mcclure disposición servicios gratuitos de asistencia lingüística. [facility] [phone].    We comply with applicable federal civil rights laws and [place] laws. We do not discriminate on the basis of race, color, national origin, age, disability, sex, sexual orientation, or gender identity.            Thank you!     Thank you for choosing [facility]  for your care. Our goal is always to provide you with excellent care. Hearing back from our patients is one way we can continue to improve our services. Please take a few minutes to complete the written survey that you may receive in the mail after your visit with us. Thank you!             Your Updated Medication List - Protect others around you: Learn how to safely use, store and throw away your medicines at www.disposemymeds.org.          This list is accurate as of 10/16/18  4:22 PM.  Always use your most recent med list.                   Brand Name Dispense Instructions for use Diagnosis    aspirin 81 MG tablet      ONE DAILY    Essential hypertension, benign, Other and unspecified hyperlipidemia       ibuprofen 200 MG tablet    ADVIL/MOTRIN     Take 800 mg by mouth 3 times daily as needed.        lisinopril 40 MG tablet    PRINIVIL/ZESTRIL    90 tablet    TAKE 1 TABLET(40 MG) BY MOUTH DAILY    Essential hypertension, benign       metoprolol succinate 25 MG  24 hr tablet    TOPROL-XL    180 tablet    Take 2 tablets (50 mg) by mouth daily    Essential hypertension, benign       multivitamin per tablet           omeprazole 20 MG CR capsule    priLOSEC    60 capsule    Take 2 capsules (40 mg) by mouth daily    Gastroesophageal reflux disease without esophagitis       pravastatin 40 MG tablet    PRAVACHOL    90 tablet    Take 1 tablet (40 mg) by mouth daily    Hyperlipidemia LDL goal <130       * sucralfate 1 GM tablet    CARAFATE    100 tablet    TAKE 1 TABLET(1 GRAM) BY MOUTH FOUR TIMES DAILY AS NEEDED    Gastroesophageal reflux disease with esophagitis       * sucralfate 1 GM tablet    CARAFATE    360 tablet    TAKE 1 TABLET(1 GRAM) BY MOUTH FOUR TIMES DAILY AS NEEDED    Gastroesophageal reflux disease with esophagitis       zolpidem 10 MG tablet    AMBIEN    30 tablet    TAKE 0.5-1 TABLET BY MOUTH EVERY EVENING AS NEEDED FOR SLEEP    Primary insomnia       * Notice:  This list has 2 medication(s) that are the same as other medications prescribed for you. Read the directions carefully, and ask your doctor or other care provider to review them with you.

## 2018-10-16 NOTE — PROGRESS NOTES

## 2018-11-07 ENCOUNTER — TRANSFERRED RECORDS (OUTPATIENT)
Dept: HEALTH INFORMATION MANAGEMENT | Facility: CLINIC | Age: 58
End: 2018-11-07

## 2018-12-20 DIAGNOSIS — I10 ESSENTIAL HYPERTENSION, BENIGN: ICD-10-CM

## 2018-12-20 DIAGNOSIS — E78.5 HYPERLIPIDEMIA LDL GOAL <130: ICD-10-CM

## 2018-12-20 RX ORDER — LISINOPRIL 40 MG/1
TABLET ORAL
Qty: 90 TABLET | Refills: 1 | Status: SHIPPED | OUTPATIENT
Start: 2018-12-20 | End: 2019-03-05

## 2018-12-20 RX ORDER — PRAVASTATIN SODIUM 40 MG
TABLET ORAL
Qty: 90 TABLET | Refills: 0 | Status: SHIPPED | OUTPATIENT
Start: 2018-12-20 | End: 2019-03-05

## 2018-12-20 NOTE — TELEPHONE ENCOUNTER
Pravastatin Prescription approved per [facility] Refill Protocol.    Routing refill request to provider for review/approval because:  Elevated BP

## 2018-12-20 NOTE — TELEPHONE ENCOUNTER
"Requested Prescriptions   Pending Prescriptions Disp Refills     pravastatin (PRAVACHOL) 40 MG tablet [Pharmacy Med Name: PRAVASTATIN SODIUM 40 MG TAB] 90 tablet 1     Sig: TAKE 1 TABLET BY MOUTH DAILY    Statins Protocol Passed - 12/20/2018  1:54 AM       Passed - LDL on file in past 12 months    Recent Labs   Lab Test 03/06/18  0824   *            Passed - No abnormal creatine kinase in past 12 months    No lab results found.            Passed - Recent (12 mo) or future (30 days) visit within the authorizing provider's specialty    Patient had office visit in the last 12 months or has a visit in the next 30 days with authorizing provider or within the authorizing provider's specialty.  See \"Patient Info\" tab in inbasket, or \"Choose Columns\" in Meds & Orders section of the refill encounter.             Passed - Patient is age 18 or older        lisinopril (PRINIVIL/ZESTRIL) 40 MG tablet [Pharmacy Med Name: LISINOPRIL 40 MG TABLET] 90 tablet 1     Sig: TAKE 1 TABLET(40 MG) BY MOUTH DAILY    ACE Inhibitors (Including Combos) Protocol Failed - 12/20/2018  1:54 AM       Failed - Blood pressure under 140/90 in past 12 months    BP Readings from Last 3 Encounters:   03/12/18 (!) 148/98   01/16/18 123/83   11/08/17 150/82       Requested Prescriptions   Pending Prescriptions Disp Refills     pravastatin (PRAVACHOL) 40 MG tablet [Pharmacy Med Name: PRAVASTATIN SODIUM 40 MG TAB] 90 tablet 1     Sig: TAKE 1 TABLET BY MOUTH DAILY    Statins Protocol Passed - 12/20/2018  1:54 AM       Passed - LDL on file in past 12 months    Recent Labs   Lab Test 03/06/18  0824   *            Passed - No abnormal creatine kinase in past 12 months    No lab results found.            Passed - Recent (12 mo) or future (30 days) visit within the authorizing provider's specialty    Patient had office visit in the last 12 months or has a visit in the next 30 days with authorizing provider or within the authorizing provider's " "specialty.  See \"Patient Info\" tab in inMolecularMDet, or \"Choose Columns\" in Meds & Orders section of the refill encounter.             Passed - Patient is age 18 or older        lisinopril (PRINIVIL/ZESTRIL) 40 MG tablet [Pharmacy Med Name: LISINOPRIL 40 MG TABLET] 90 tablet 1     Sig: TAKE 1 TABLET(40 MG) BY MOUTH DAILY    ACE Inhibitors (Including Combos) Protocol Failed - 12/20/2018  1:54 AM       Failed - Blood pressure under 140/90 in past 12 months    BP Readings from Last 3 Encounters:   03/12/18 (!) 148/98   01/16/18 123/83   11/08/17 150/82                Failed - Normal serum creatinine on file in past 12 months    Recent Labs   Lab Test 11/08/17  0914   CR 0.74            Failed - Normal serum potassium on file in past 12 months    Recent Labs   Lab Test 11/08/17  0914   POTASSIUM 4.0            Passed - Recent (12 mo) or future (30 days) visit within the authorizing provider's specialty    Patient had office visit in the last 12 months or has a visit in the next 30 days with authorizing provider or within the authorizing provider's specialty.  See \"Patient Info\" tab in inMolecularMDet, or \"Choose Columns\" in Meds & Orders section of the refill encounter.             Passed - Patient is age 18 or older        Last Written Prescription Date:  8/28/18  Last Fill Quantity: 90,  # refills: 1   Last office visit: 3/12/2018 with prescribing provider:  3/12/18   Future Office Visit:             Failed - Normal serum creatinine on file in past 12 months    Recent Labs   Lab Test 11/08/17  0914   CR 0.74            Failed - Normal serum potassium on file in past 12 months    Recent Labs   Lab Test 11/08/17  0914   POTASSIUM 4.0            Passed - Recent (12 mo) or future (30 days) visit within the authorizing provider's specialty    Patient had office visit in the last 12 months or has a visit in the next 30 days with authorizing provider or within the authorizing provider's specialty.  See \"Patient Info\" tab in inbasket, or " "\"Choose Columns\" in Meds & Orders section of the refill encounter.             Passed - Patient is age 18 or older        Last Written Prescription Date:  3/12/18  Last Fill Quantity: 90,  # refills: 3   Last office visit: 3/12/2018 with prescribing provider:  3/12/18   Future Office Visit:      "

## 2019-01-26 DIAGNOSIS — F51.01 PRIMARY INSOMNIA: ICD-10-CM

## 2019-01-26 NOTE — TELEPHONE ENCOUNTER
Requested Prescriptions   Pending Prescriptions Disp Refills     zolpidem (AMBIEN) 10 MG tablet [Pharmacy Med Name: ZOLPIDEM TARTRATE 10 MG TABLET] 30 tablet 1     Sig: TAKE 0.5 TO 1 TABLET BY MOUTH EVERY EVENING AS NEEDED FOR SLEEP    There is no refill protocol information for this order              Last Written Prescription Date:  12/3/2018  Last Fill Quantity: 30,   # refills: 1  Last Office Visit: 3/12/2018  Future Office visit:       Routing refill request to provider for review/approval because:  Drug not on the [facility], [facility] or [facility] refill protocol or controlled substance

## 2019-01-28 ENCOUNTER — TRANSFERRED RECORDS (OUTPATIENT)
Dept: HEALTH INFORMATION MANAGEMENT | Facility: CLINIC | Age: 59
End: 2019-01-28

## 2019-01-28 RX ORDER — ZOLPIDEM TARTRATE 10 MG/1
TABLET ORAL
Qty: 30 TABLET | Refills: 0 | Status: SHIPPED | OUTPATIENT
Start: 2019-01-28 | End: 2019-02-20

## 2019-02-20 DIAGNOSIS — F51.01 PRIMARY INSOMNIA: ICD-10-CM

## 2019-02-20 RX ORDER — ZOLPIDEM TARTRATE 10 MG/1
TABLET ORAL
Qty: 30 TABLET | Refills: 0 | OUTPATIENT
Start: 2019-02-20

## 2019-02-20 RX ORDER — ZOLPIDEM TARTRATE 10 MG/1
5-10 TABLET ORAL
Qty: 30 TABLET | Refills: 1 | Status: SHIPPED | OUTPATIENT
Start: 2019-02-20 | End: 2019-04-20

## 2019-02-20 NOTE — TELEPHONE ENCOUNTER
ambien      Last Written Prescription Date:  1/28/19  Last Fill Quantity: 30,   # refills: 0  Last Office Visit: 3/12/18  Future Office visit:    Next 5 appointments (look out 90 days)    Feb 26, 2019 11:40 AM CST  Pre-Op physical with Shayne Suggs [facility]  [facility] [address] [place] [id number] [phone]           Routing refill request to provider for review/approval because:  Drug not on the [facility], [facility] or [facility] refill protocol or controlled substance

## 2019-02-21 ENCOUNTER — TRANSFERRED RECORDS (OUTPATIENT)
Dept: HEALTH INFORMATION MANAGEMENT | Facility: CLINIC | Age: 59
End: 2019-02-21

## 2019-02-26 ASSESSMENT — MIFFLIN-ST. JEOR: SCORE: 1970.24

## 2019-02-28 DIAGNOSIS — I10 ESSENTIAL HYPERTENSION, BENIGN: ICD-10-CM

## 2019-03-01 NOTE — TELEPHONE ENCOUNTER
Routing refill request to provider for review/approval because:  Labs out of range:  Blood pressure  Pt has upcoming appt with you on 3/5 at 10:20am.

## 2019-03-01 NOTE — TELEPHONE ENCOUNTER
"Requested Prescriptions   Pending Prescriptions Disp Refills     metoprolol succinate ER (TOPROL-XL) 25 MG 24 hr tablet [Pharmacy Med Name: METOPROLOL SUCC ER 25 MG TAB] 90 tablet 1    Last Written Prescription Date:  3/27/2018  Last Fill Quantity: 180,  # refills: 3   Last office visit: 3/12/2018 with prescribing provider:  3/12/2018   Future Office Visit:   Next 5 appointments (look out 90 days)    Mar 05, 2019 10:20 AM CST  Pre-Op physical with Shayne Suggs MD  [facility] [address] [id number] [phone]          Sig: TAKE 1 TABLET BY MOUTH DAILY    Beta-Blockers Protocol Failed - 2/28/2019  7:10 PM       Failed - Blood pressure under 140/90 in past 12 months    BP Readings from Last 3 Encounters:   03/12/18 (!) 148/98   01/16/18 123/83   11/08/17 150/82                Passed - Patient is age 6 or older       Passed - Recent (12 mo) or future (30 days) visit within the authorizing provider's specialty    Patient had office visit in the last 12 months or has a visit in the next 30 days with authorizing provider or within the authorizing provider's specialty.  See \"Patient Info\" tab in inbasket, or \"Choose Columns\" in Meds & Orders section of the refill encounter.             Passed - Medication is active on med list          "

## 2019-03-04 RX ORDER — METOPROLOL SUCCINATE 25 MG/1
TABLET, EXTENDED RELEASE ORAL
Qty: 90 TABLET | Refills: 3 | Status: SHIPPED | OUTPATIENT
Start: 2019-03-04 | End: 2019-03-07

## 2019-03-05 ENCOUNTER — OFFICE VISIT (OUTPATIENT)
Dept: INTERNAL MEDICINE | Facility: CLINIC | Age: 59
End: 2019-03-05
Payer: COMMERCIAL

## 2019-03-05 VITALS
SYSTOLIC BLOOD PRESSURE: 140 MMHG | WEIGHT: 264 LBS | TEMPERATURE: 97.4 F | BODY MASS INDEX: 37.88 KG/M2 | RESPIRATION RATE: 16 BRPM | HEART RATE: 100 BPM | DIASTOLIC BLOOD PRESSURE: 90 MMHG

## 2019-03-05 DIAGNOSIS — M17.12 PRIMARY OSTEOARTHRITIS OF LEFT KNEE: ICD-10-CM

## 2019-03-05 DIAGNOSIS — Z12.5 SCREENING FOR PROSTATE CANCER: ICD-10-CM

## 2019-03-05 DIAGNOSIS — E78.5 HYPERLIPIDEMIA LDL GOAL <130: ICD-10-CM

## 2019-03-05 DIAGNOSIS — Z12.11 SCREEN FOR COLON CANCER: ICD-10-CM

## 2019-03-05 DIAGNOSIS — Z01.812 PRE-OPERATIVE LABORATORY EXAMINATION: ICD-10-CM

## 2019-03-05 DIAGNOSIS — Z11.4 SCREENING FOR HIV (HUMAN IMMUNODEFICIENCY VIRUS): ICD-10-CM

## 2019-03-05 DIAGNOSIS — E66.01 SEVERE OBESITY (BMI 35.0-35.9 WITH COMORBIDITY) (H): ICD-10-CM

## 2019-03-05 DIAGNOSIS — I10 ESSENTIAL HYPERTENSION, BENIGN: ICD-10-CM

## 2019-03-05 DIAGNOSIS — Z01.818 PREOP GENERAL PHYSICAL EXAM: Primary | ICD-10-CM

## 2019-03-05 DIAGNOSIS — K21.00 GASTROESOPHAGEAL REFLUX DISEASE WITH ESOPHAGITIS: ICD-10-CM

## 2019-03-05 DIAGNOSIS — R73.03 PREDIABETES: ICD-10-CM

## 2019-03-05 LAB
ALBUMIN SERPL-MCNC: 4.4 G/DL (ref 3.4–5)
ALP SERPL-CCNC: 79 U/L (ref 40–150)
ALT SERPL W P-5'-P-CCNC: 61 U/L (ref 0–70)
ANION GAP SERPL CALCULATED.3IONS-SCNC: 7 MMOL/L (ref 3–14)
AST SERPL W P-5'-P-CCNC: 37 U/L (ref 0–45)
BILIRUB SERPL-MCNC: 0.6 MG/DL (ref 0.2–1.3)
BUN SERPL-MCNC: 14 MG/DL (ref 7–30)
CALCIUM SERPL-MCNC: 9.5 MG/DL (ref 8.5–10.1)
CHLORIDE SERPL-SCNC: 103 MMOL/L (ref 94–109)
CHOLEST SERPL-MCNC: 188 MG/DL
CO2 SERPL-SCNC: 28 MMOL/L (ref 20–32)
CREAT SERPL-MCNC: 0.74 MG/DL (ref 0.66–1.25)
ERYTHROCYTE [DISTWIDTH] IN BLOOD BY AUTOMATED COUNT: 13.4 % (ref 10–15)
GFR SERPL CREATININE-BSD FRML MDRD: >90 ML/MIN/{1.73_M2}
GLUCOSE SERPL-MCNC: 94 MG/DL (ref 70–99)
HBA1C MFR BLD: 6 % (ref 0–5.6)
HCT VFR BLD AUTO: 47.1 % (ref 40–53)
HDLC SERPL-MCNC: 42 MG/DL
HGB BLD-MCNC: 15.6 G/DL (ref 13.3–17.7)
HIV 1+2 AB+HIV1 P24 AG SERPL QL IA: NONREACTIVE
LDLC SERPL CALC-MCNC: 103 MG/DL
MCH RBC QN AUTO: 29.6 PG (ref 26.5–33)
MCHC RBC AUTO-ENTMCNC: 33.1 G/DL (ref 31.5–36.5)
MCV RBC AUTO: 89 FL (ref 78–100)
MRSA DNA SPEC QL NAA+PROBE: NEGATIVE
NONHDLC SERPL-MCNC: 146 MG/DL
PLATELET # BLD AUTO: 242 10E9/L (ref 150–450)
POTASSIUM SERPL-SCNC: 4.5 MMOL/L (ref 3.4–5.3)
PROT SERPL-MCNC: 7.6 G/DL (ref 6.8–8.8)
PSA SERPL-ACNC: 0.55 UG/L (ref 0–4)
RBC # BLD AUTO: 5.27 10E12/L (ref 4.4–5.9)
SODIUM SERPL-SCNC: 138 MMOL/L (ref 133–144)
SPECIMEN SOURCE: NORMAL
TRIGL SERPL-MCNC: 216 MG/DL
WBC # BLD AUTO: 6.3 10E9/L (ref 4–11)

## 2019-03-05 PROCEDURE — 99215 OFFICE O/P EST HI 40 MIN: CPT | Performed by: INTERNAL MEDICINE

## 2019-03-05 PROCEDURE — G0103 PSA SCREENING: HCPCS | Performed by: INTERNAL MEDICINE

## 2019-03-05 PROCEDURE — 83036 HEMOGLOBIN GLYCOSYLATED A1C: CPT | Performed by: INTERNAL MEDICINE

## 2019-03-05 PROCEDURE — 85027 COMPLETE CBC AUTOMATED: CPT | Performed by: INTERNAL MEDICINE

## 2019-03-05 PROCEDURE — 87641 MR-STAPH DNA AMP PROBE: CPT | Performed by: INTERNAL MEDICINE

## 2019-03-05 PROCEDURE — 36415 COLL VENOUS BLD VENIPUNCTURE: CPT | Performed by: INTERNAL MEDICINE

## 2019-03-05 PROCEDURE — 93000 ELECTROCARDIOGRAM COMPLETE: CPT | Performed by: INTERNAL MEDICINE

## 2019-03-05 PROCEDURE — 80061 LIPID PANEL: CPT | Performed by: INTERNAL MEDICINE

## 2019-03-05 PROCEDURE — 87389 HIV-1 AG W/HIV-1&-2 AB AG IA: CPT | Performed by: INTERNAL MEDICINE

## 2019-03-05 PROCEDURE — 80053 COMPREHEN METABOLIC PANEL: CPT | Performed by: INTERNAL MEDICINE

## 2019-03-05 RX ORDER — LISINOPRIL 40 MG/1
40 TABLET ORAL DAILY
Qty: 90 TABLET | Refills: 3 | Status: SHIPPED | OUTPATIENT
Start: 2019-03-05 | End: 2020-05-26

## 2019-03-05 RX ORDER — PRAVASTATIN SODIUM 40 MG
40 TABLET ORAL DAILY
Qty: 90 TABLET | Refills: 3 | Status: SHIPPED | OUTPATIENT
Start: 2019-03-05 | End: 2020-03-30

## 2019-03-05 RX ORDER — SUCRALFATE 1 G/1
TABLET ORAL
Qty: 360 TABLET | Refills: 1 | Status: SHIPPED | OUTPATIENT
Start: 2019-03-05 | End: 2019-12-23

## 2019-03-05 NOTE — LETTER
3/6/2019      Ab Aguilar  [address]  [facility] [id number]      Dear Mr. Ab Aguilar:    I am writing to inform you of the results of the laboratory tests you had done recently.    Total Cholesterol:   Lab Results   Component Value Date    CHOL 188 03/05/2019          (Recommended: below 200)  HDL (good) Cholesterol :   Lab Results   Component Value Date    HDL 42 03/05/2019         (Recommended: 40 or more)  LDL (bad) Cholesterol:    Lab Results   Component Value Date     03/05/2019          (Recommended: below 130, below 100 if heart disease or diabetes is diagnosed)  Triglycerides:    Lab Results   Component Value Date    TRIG 216 03/05/2019       (Recommended: below 180)  Non HDL cholesterol (Cholesterol ratio:   Lab Results   Component Value Date    CHOLHDLRATIO 5.1 10/30/2014    NHDL 146 03/05/2019     (Ideally below 130, Acceptable below 160).    Additional results of your recent labs are as noted.  Liver function: NORMAL  Kidney function: NORMAL  Hemoglobin: NORMAL  PSA: NORMAL  Electrolytes: NORMAL  Glucose: NORMAL  Hgb A1C: NORMAL    Your labs all look good, especially the triglycerides (they used to be MUCH higher).  Great job on the diet changes that you have made, please continue.  Continue all medications at the same doses.  Contact your usual pharmacy if you need refills.  Good luck with your surgery.      Thank you for allowing me to participate in your care. If you have any further questions or problems, please contact me via our nurse line at [phone]    Sincerely,          Shayne Suggs M.D.  Department of Internal Medicine  [facility]

## 2019-03-05 NOTE — PATIENT INSTRUCTIONS
"  *  Return the \"FIT\" stool card test to us via mail.  This is a basic level screening for colon cancer by detecting trace amount of occult blood in the intestinal tract.  My preference (and that of the [employer]) would normally be for a full colonoscopy, but the FIT test can suffice for now.  Eventually you should have a colonoscopy.  If the FIT test shows any traces of occult blood, it does NOT necessarily mean that you have colon cancer, it just means that we should probably consider doing the colonoscopy.       PRE-OPERATIVE INSTRUCTIONS:     *  Call your surgeon if there is any change in your health. This includes signs of a cold or flu (such as a sore throat, runny nose, cough, rash or fever).    *  Stop aspirin of any kind for 7 days before procedure.   (even low dose daily aspirin).    *  No NSAIDs (Motrin, Advil, ibuprofen, Aleve, etc) within 5-7 days of surgery.    *  Tylenol (acetaminophen) OK to take if needed for pain or headache.  Follow instructions on the bottle    *  Stop any Fish Oil or vitamin E supplements for 7 days prior to surgery because these can affect platelet function.      *  Prepare your body as instructed by the surgery clinic.  Take a shower or bath the night before surgery. Use any special soaps or cleaning instructions according to instructions from your surgeon.  If you do not have soap from your surgeon, use your regular soap. Do not shave or scrub the surgery site.  Wear clean pajamas and have clean sheets on your bed     *  ON THE MORNING OF SURGERY:     --do NOT take lisinopril on the morning of surgery     --Take all other usual medications, especially be sure to take the Metoprolol with a small sip of water.       *  Resume all medications at the same doses after surgery, unless instructed otherwise by the medical staff.     *  Attend all follow up appointments with the surgeons (and/or therapists if applicable) as instructed.     *  Contact the surgeon's office " for any specific questions about after-surgery cares and follow up instructions.        IF YOU REQUIRE NARCOTIC PAIN MEDICATION AFTER YOUR SURGERY:    --Take the narcotic pain medication exactly as prescribed, and use the absolute lowest dose needed for pain control.   The goal of pain medication is not complete pain relief, aim to just make it manageable.    --Beware of drowsiness, nausea, vomiting, when taking this medication.  Do not drive, or operate dangerous equipment after taking this.    --The main side effects from narcotic pain medication can also include intestinal side effects including nausea, vomiting, constipation, or diarrhea.    --If your pain is not able to be controlled with the pain medication supplied to you, contact the surgeons because uncontrolled pain can be a sign of possible surgical complications.    --In case of constipation from pain medications, take over the counter Miralax powder or stool softner Senokot.  If you have a history of constipation with narcotic pain medication, consider taking Miralax powder on any day that you take a pain tablet.

## 2019-03-05 NOTE — PROGRESS NOTES
[address] [address]  [phone]  Dept: [phone]    PRE-OP EVALUATION:  Today's date: 3/5/2019    Ab Aguilar (: 1960) presents for pre-operative evaluation assessment as requested by .  He requires evaluation and anesthesia risk assessment prior to undergoing surgery/procedure for treatment of Knee Replacement .    Fax number for surgical facility: [facility]   Primary Physician: Shayne Suggs  Type of Anesthesia Anticipated: General    Patient has a Health Care Directive or Living Will:  NO    Preop Questions 2019   Who is doing your surgery? Dr. Zulema Lacey   What are you having done? Left knee replacement   Date of Surgery/Procedure: 19   Facility or Hospital where procedure/surgery will be performed: [facility]   1.  Do you have a history of Heart attack, stroke, stent, coronary bypass surgery, or other heart surgery? No   2.  Do you ever have any pain or discomfort in your chest? No   3.  Do you have a history of  Heart Failure? No   4.   Are you troubled by shortness of breath when:  walking on a level surface, or up a slight hill, or at night? No   5.  Do you currently have a cold, bronchitis or other respiratory infection? No   6.  Do you have a cough, shortness of breath, or wheezing? No   7.  Do you sometimes get pains in the calves of your legs when you walk? No   8. Do you or anyone in your family have previous history of blood clots? No   9.  Do you or does anyone in your family have a serious bleeding problem such as prolonged bleeding following surgeries or cuts? No   10. Have you ever had problems with anemia or been told to take iron pills? No   11. Have you had any abnormal blood loss such as black, tarry or bloody stools? No   12. Have you ever had a blood transfusion? No   13. Have you or any of your relatives ever had problems with anesthesia? No   14. Do you have sleep apnea,  excessive snoring or daytime drowsiness? No   15. Do you have any prosthetic heart valves? No   16. Do you have prosthetic joints? YES - right total knee arthroplasty 2017         HPI:     HPI related to upcoming procedure: end stage DJD left knee, unable to walk withotu significant pain      *  No recent infectious illnesses.    *  No recent cardiac or pulmonary issues or symptoms.    *  No problems performing vigorous physical activity, no changes in exercise tolerance.    *  No personal or family history of anesthesia complications.    *  No personal or family history of bleeding or clotting disorders.     Blood presure remains well controlled at home  Readings outside clinic are within normal limits.  Reviewed last 6 BP readings in chart:  BP Readings from Last 6 Encounters:   03/05/19 140/90   03/12/18 (!) 148/98   01/16/18 123/83   11/08/17 150/82   06/28/17 (!) 170/100   05/09/17 (!) 140/98     He has not experienced any significant side effects from medicaiotns for hypertension.    NO active cardiac complaints or symptoms with exercise.     MEDICAL HISTORY:     Patient Active Problem List    Diagnosis Date Noted     Obesity (BMI 35.0-39.9) with comorbidity (H) 03/05/2019     Priority: Medium     Prediabetes 09/26/2012     Priority: Medium     A1C 6.1       Obesity (BMI 35.0-35.9 with comorbidity) (H) 01/01/2012     Priority: Medium     Major depressive disorder with single episode, in full remission (H)      Priority: Medium     HYPERLIPIDEMIA LDL GOAL <130 10/31/2010     Priority: Medium     Obesity      Priority: Medium     Gastroesophageal reflux disease with esophagitis      Priority: Medium     Essential hypertension, benign 10/17/2002     Priority: Medium      Past Medical History:   Diagnosis Date     Esophageal reflux      Essential hypertension, benign      Major depression      Obesity      Obesity (BMI 35.0-35.9 with comorbidity) (H) 2012     Other and unspecified hyperlipidemia      Other chronic  pain     lt knee     Prediabetes 9/26/12    A1C 6.1     Past Surgical History:   Procedure Laterality Date     C NONSPECIFIC PROCEDURE  1991    R ankle fracture, repair     C NONSPECIFIC PROCEDURE  age 6    B inguinal hernia repair     C NONSPECIFIC PROCEDURE  9/01    lumbar laminectomy     ORTHOPEDIC SURGERY      rt knee replacement 2017, rt rotator cuff repair 6 years ago     STRESS ECHO (METRO)  10/02    normal      STRESS ECHO ([facility])  9/05    normal stress echo     Current Outpatient Medications   Medication Sig Dispense Refill     ASPIRIN 81 MG OR TABS ONE DAILY       ibuprofen (ADVIL,MOTRIN) 200 MG tablet Take 800 mg by mouth 3 times daily as needed.       lisinopril (PRINIVIL/ZESTRIL) 40 MG tablet Take 1 tablet (40 mg) by mouth daily 90 tablet 3     metoprolol succinate ER (TOPROL-XL) 25 MG 24 hr tablet TAKE 1 TABLET BY MOUTH DAILY 90 tablet 3     omeprazole (PRILOSEC) 20 MG CR capsule Take 2 capsules (40 mg) by mouth daily 60 capsule 11     pravastatin (PRAVACHOL) 40 MG tablet Take 1 tablet (40 mg) by mouth daily 90 tablet 3     sucralfate (CARAFATE) 1 GM tablet TAKE 1 TABLET(1 GRAM) BY MOUTH FOUR TIMES DAILY AS NEEDED 360 tablet 1     zolpidem (AMBIEN) 10 MG tablet Take 0.5-1 tablets (5-10 mg) by mouth nightly as needed for sleep 30 tablet 1     OTC products: None, except as noted above and no recent use of OTC ASA, NSAIDS or Steroids    Allergies   Allergen Reactions     Atorvastatin GI Disturbance     Abdominal and intestinal pains from atorvastatin      Latex Allergy: NO    Social History     Tobacco Use     Smoking status: Never Smoker     Smokeless tobacco: Never Used   Substance Use Topics     Alcohol use: Yes     Comment: 10-12 beers/week     History   Drug Use No       REVIEW OF SYSTEMS:   CONSTITUTIONAL: NEGATIVE for fever, chills, change in weight  INTEGUMENTARY/SKIN: NEGATIVE for worrisome rashes, moles or lesions  EYES: NEGATIVE for vision changes or irritation  ENT/MOUTH: NEGATIVE for ear,  mouth and throat problems  RESP: NEGATIVE for significant cough or SOB  BREAST: NEGATIVE for masses, tenderness or discharge  CV: NEGATIVE for chest pain, palpitations or peripheral edema  GI: NEGATIVE for nausea, abdominal pain, heartburn, or change in bowel habits  : NEGATIVE for frequency, dysuria, or hematuria  MUSCULOSKELETAL: NEGATIVE for significant arthralgias or myalgia  NEURO: NEGATIVE for weakness, dizziness or paresthesias  ENDOCRINE: NEGATIVE for temperature intolerance, skin/hair changes  HEME: NEGATIVE for bleeding problems  PSYCHIATRIC: NEGATIVE for changes in mood or affect    EXAM:   /90   Pulse 100   Temp 97.4  F (36.3  C) (Oral)   Resp 16   Wt 119.7 kg (264 lb)   BMI 37.88 kg/m    GENERAL alert and no distress  EYES:  Normal sclera,conjunctiva, EOMI  HENT: oral and posterior pharynx without lesions or erythema, facies symmetric  NECK: Neck supple. No LAD, without thyroidmegaly or JVD., Carotids without bruits.  RESP: Clear to ausculation bilaterally without wheezes or crackles. Normal BS in all fields.  CV: RRR normal S1S2 without murmurs, rubs or gallops. PMI normal  LYMPH: no cervical lymph adenopathy appreciated  MS: extremities- no gross deformities of the visible extremities noted, no edema  PSYCH: Alert and oriented times 3; speech- coherent  SKIN:  No obvious significant skin lesions on visible portions of face     DIAGNOSTICS:   EKG (3/5/2019): appears normal, NSR, normal axis, normal intervals, no acute ST/T changes c/w ischemia, no LVH by voltage criteria, unchanged from previous tracings    Recent Labs   Lab Test 11/08/17  0914 02/14/17  0852  10/30/14  0751 09/23/13  0821   HGB 15.8 16.1   < > 15.3  --     245   < > 253  --     138   < > 140 142   POTASSIUM 4.0 3.8   < > 4.6 4.9   CR 0.74 0.70   < > 0.80 0.65*   A1C  --   --   --  5.9 5.7    < > = values in this interval not displayed.        IMPRESSION:   Reason for surgery/procedure: end stage DJD left  knee    Diagnosis/reason for consult:     1. Preop general physical exam    2. Primary osteoarthritis of left knee    3. Obesity (BMI 35.0-35.9 with comorbidity) (H)    4. Essential hypertension, benign    5. Hyperlipidemia LDL goal <130    6. Gastroesophageal reflux disease with esophagitis    7. Screen for colon cancer    8. Screening for HIV (human immunodeficiency virus)    9. Prediabetes         The proposed surgical procedure is considered INTERMEDIATE risk.    REVISED CARDIAC RISK INDEX  The patient has the following serious cardiovascular risks for perioperative complications such as (MI, PE, VFib and 3  AV Block):  No serious cardiac risks  INTERPRETATION: 0 risks: Class I (very low risk - 0.4% complication rate)    The patient has the following additional risks for perioperative complications:  No identified additional risks      ICD-10-CM    1. Preop general physical exam Z01.818 CBC with platelets     **Comprehensive metabolic panel FUTURE 1yr     EKG 12-lead complete w/read - Clinics   2. Primary osteoarthritis of left knee M17.12    3. Obesity (BMI 35.0-35.9 with comorbidity) (H) E66.01     Z68.35    4. Essential hypertension, benign I10 lisinopril (PRINIVIL/ZESTRIL) 40 MG tablet     CBC with platelets     **Comprehensive metabolic panel FUTURE 1yr     EKG 12-lead complete w/read - Clinics   5. Hyperlipidemia LDL goal <130 E78.5 pravastatin (PRAVACHOL) 40 MG tablet     Lipid panel reflex to direct LDL Fasting     **Comprehensive metabolic panel FUTURE 1yr   6. Gastroesophageal reflux disease with esophagitis K21.0 sucralfate (CARAFATE) 1 GM tablet   7. Screen for colon cancer Z12.11 Fecal colorectal cancer screen (FIT)   8. Screening for HIV (human immunodeficiency virus) Z11.4 HIV Screening   9. Prediabetes R73.03 Hemoglobin A1c       RECOMMENDATIONS:       Cardiovascular Risk  Patient is already on a Beta Blocker. Continue Betablocker therapy after surgery, using Beta blocker order set as necessary  for NPO status.      Pulmonary Risk  history of obesity, at risk for obstructive sleep apnea, but no formal diagnosis  Observe for desaturatiosn while sedated, provide supplemental O2 if needed.       --Patient is to take all scheduled medications on the day of surgery EXCEPT for modifications listed below.    Anticoagulant or Antiplatelet Medication Use  No ASA or NSAIDs within 7 days of surgery         ACE Inhibitor or Angiotensin Receptor Blocker (ARB) Use  Ace inhibitor or Angiotensin Receptor Blocker (ARB) and should HOLD this medication for the 24 hours prior to surgery.      APPROVAL GIVEN to proceed with proposed procedure, without further diagnostic evaluation          Signed Electronically by: Shayne Suggs MD    Copy of this evaluation report is provided to requesting physician.    [place] Preop Guidelines    Revised Cardiac Risk Index

## 2019-03-07 DIAGNOSIS — I10 ESSENTIAL HYPERTENSION, BENIGN: ICD-10-CM

## 2019-03-07 NOTE — TELEPHONE ENCOUNTER
"Requested Prescriptions   Pending Prescriptions Disp Refills     metoprolol succinate ER (TOPROL-XL) 25 MG 24 hr tablet [Pharmacy Med Name: METOPROLOL SUCC ER 25 MG TAB] 90 tablet 1     Sig: TAKE 1 TABLET BY MOUTH DAILY    Beta-Blockers Protocol Failed - 3/7/2019  3:05 PM       Failed - Blood pressure under 140/90 in past 12 months    BP Readings from Last 3 Encounters:   03/05/19 140/90   03/12/18 (!) 148/98   01/16/18 123/83                Passed - Patient is age 6 or older       Passed - Recent (12 mo) or future (30 days) visit within the authorizing provider's specialty    Patient had office visit in the last 12 months or has a visit in the next 30 days with authorizing provider or within the authorizing provider's specialty.  See \"Patient Info\" tab in inbasket, or \"Choose Columns\" in Meds & Orders section of the refill encounter.             Passed - Medication is active on med list        Routing refill request to provider for review/approval because:   out of range:  blood pressure          "

## 2019-03-08 RX ORDER — METOPROLOL SUCCINATE 25 MG/1
TABLET, EXTENDED RELEASE ORAL
Qty: 90 TABLET | Refills: 1 | Status: SHIPPED | OUTPATIENT
Start: 2019-03-08 | End: 2019-09-04

## 2019-03-09 ENCOUNTER — ANESTHESIA EVENT (OUTPATIENT)
Dept: SURGERY | Facility: CLINIC | Age: 59
DRG: 470 | End: 2019-03-09
Payer: COMMERCIAL

## 2019-03-09 RX ORDER — ASPIRIN 81 MG/1
81 TABLET ORAL DAILY
Status: ON HOLD | COMMUNITY
End: 2019-03-15

## 2019-03-11 NOTE — PHARMACY-ADMISSION MEDICATION HISTORY
Admission medication history interview status for this patient is complete. See [facility] admission navigator for allergy information, prior to admission medications and immunization status.     PTA meds completed by pre-admitting nurse Gretta Rocha and reviewed by pharmacy       Prior to Admission medications    Medication Sig Last Dose Taking? Auth Provider   aspirin 81 MG EC tablet Take 81 mg by mouth daily  Yes Unknown, Entered By History   ibuprofen (ADVIL,MOTRIN) 200 MG tablet Take 800 mg by mouth 3 times daily as needed.  Yes Reported, Patient   omeprazole (PRILOSEC) 20 MG CR capsule Take 2 capsules (40 mg) by mouth daily  Yes Shayne Suggs MD   zolpidem (AMBIEN) 10 MG tablet Take 0.5-1 tablets (5-10 mg) by mouth nightly as needed for sleep  Yes Shayne Suggs MD   lisinopril (PRINIVIL/ZESTRIL) 40 MG tablet Take 1 tablet (40 mg) by mouth daily   Shayne Suggs MD   metoprolol succinate ER (TOPROL-XL) 25 MG 24 hr tablet TAKE 1 TABLET BY MOUTH DAILY   Shayne Suggs MD   pravastatin (PRAVACHOL) 40 MG tablet Take 1 tablet (40 mg) by mouth daily   Shayne Suggs MD   sucralfate (CARAFATE) 1 GM tablet TAKE 1 TABLET(1 GRAM) BY MOUTH FOUR TIMES DAILY AS NEEDED   Shayne Suggs MD

## 2019-03-12 ASSESSMENT — MIFFLIN-ST. JEOR: SCORE: 2033.75

## 2019-03-14 ENCOUNTER — HOSPITAL ENCOUNTER (INPATIENT)
Facility: CLINIC | Age: 59
LOS: 2 days | Discharge: HOME | DRG: 470 | End: 2019-03-16
Attending: ORTHOPAEDIC SURGERY | Admitting: ORTHOPAEDIC SURGERY
Payer: COMMERCIAL

## 2019-03-14 ENCOUNTER — ANESTHESIA (OUTPATIENT)
Dept: SURGERY | Facility: CLINIC | Age: 59
DRG: 470 | End: 2019-03-14
Payer: COMMERCIAL

## 2019-03-14 ENCOUNTER — APPOINTMENT (OUTPATIENT)
Dept: PHYSICAL THERAPY | Facility: CLINIC | Age: 59
DRG: 470 | End: 2019-03-14
Attending: ORTHOPAEDIC SURGERY
Payer: COMMERCIAL

## 2019-03-14 DIAGNOSIS — Z96.652 H/O TOTAL KNEE REPLACEMENT, LEFT: Primary | ICD-10-CM

## 2019-03-14 LAB
GLUCOSE BLDC GLUCOMTR-MCNC: 120 MG/DL (ref 70–99)
GLUCOSE BLDC GLUCOMTR-MCNC: 138 MG/DL (ref 70–99)
GLUCOSE BLDC GLUCOMTR-MCNC: 154 MG/DL (ref 70–99)
TROPONIN I SERPL-MCNC: <0.015 UG/L (ref 0–0.04)
TROPONIN I SERPL-MCNC: <0.015 UG/L (ref 0–0.04)

## 2019-03-14 PROCEDURE — 25000125 ZZHC RX 250: Performed by: ORTHOPAEDIC SURGERY

## 2019-03-14 PROCEDURE — 36415 COLL VENOUS BLD VENIPUNCTURE: CPT | Performed by: INTERNAL MEDICINE

## 2019-03-14 PROCEDURE — 97110 THERAPEUTIC EXERCISES: CPT | Mod: GP

## 2019-03-14 PROCEDURE — 71000013 ZZH RECOVERY PHASE 1 LEVEL 1 EA ADDTL HR: Performed by: ORTHOPAEDIC SURGERY

## 2019-03-14 PROCEDURE — 27211024 ZZHC OR SUPPLY OTHER OPNP: Performed by: ORTHOPAEDIC SURGERY

## 2019-03-14 PROCEDURE — C1776 JOINT DEVICE (IMPLANTABLE): HCPCS | Performed by: ORTHOPAEDIC SURGERY

## 2019-03-14 PROCEDURE — 25000128 H RX IP 250 OP 636: Performed by: ANESTHESIOLOGY

## 2019-03-14 PROCEDURE — 25800025 ZZH RX 258: Performed by: ORTHOPAEDIC SURGERY

## 2019-03-14 PROCEDURE — 25800030 ZZH RX IP 258 OP 636: Performed by: ORTHOPAEDIC SURGERY

## 2019-03-14 PROCEDURE — 99222 1ST HOSP IP/OBS MODERATE 55: CPT | Performed by: INTERNAL MEDICINE

## 2019-03-14 PROCEDURE — 25800030 ZZH RX IP 258 OP 636: Performed by: ANESTHESIOLOGY

## 2019-03-14 PROCEDURE — 93010 ELECTROCARDIOGRAM REPORT: CPT | Performed by: INTERNAL MEDICINE

## 2019-03-14 PROCEDURE — 25000128 H RX IP 250 OP 636: Performed by: ORTHOPAEDIC SURGERY

## 2019-03-14 PROCEDURE — 40000275 ZZH STATISTIC RCP TIME EA 10 MIN

## 2019-03-14 PROCEDURE — 25000128 H RX IP 250 OP 636: Performed by: NURSE ANESTHETIST, CERTIFIED REGISTERED

## 2019-03-14 PROCEDURE — 36000063 ZZH SURGERY LEVEL 4 EA 15 ADDTL MIN: Performed by: ORTHOPAEDIC SURGERY

## 2019-03-14 PROCEDURE — 71000012 ZZH RECOVERY PHASE 1 LEVEL 1 FIRST HR: Performed by: ORTHOPAEDIC SURGERY

## 2019-03-14 PROCEDURE — 00000146 ZZHCL STATISTIC GLUCOSE BY METER IP

## 2019-03-14 PROCEDURE — 12000000 ZZH R&B MED SURG/OB

## 2019-03-14 PROCEDURE — 97161 PT EVAL LOW COMPLEX 20 MIN: CPT | Mod: GP

## 2019-03-14 PROCEDURE — 0SRD0J9 REPLACEMENT OF LEFT KNEE JOINT WITH SYNTHETIC SUBSTITUTE, CEMENTED, OPEN APPROACH: ICD-10-PCS | Performed by: ORTHOPAEDIC SURGERY

## 2019-03-14 PROCEDURE — 27810169 ZZH OR IMPLANT GENERAL: Performed by: ORTHOPAEDIC SURGERY

## 2019-03-14 PROCEDURE — 27210794 ZZH OR GENERAL SUPPLY STERILE: Performed by: ORTHOPAEDIC SURGERY

## 2019-03-14 PROCEDURE — 99207 ZZC CONSULT E&M CHANGED TO INITIAL LEVEL: CPT | Performed by: INTERNAL MEDICINE

## 2019-03-14 PROCEDURE — 25000125 ZZHC RX 250: Performed by: NURSE ANESTHETIST, CERTIFIED REGISTERED

## 2019-03-14 PROCEDURE — 40000306 ZZH STATISTIC PRE PROC ASSESS II: Performed by: ORTHOPAEDIC SURGERY

## 2019-03-14 PROCEDURE — 25000128 H RX IP 250 OP 636: Performed by: PHYSICIAN ASSISTANT

## 2019-03-14 PROCEDURE — 37000009 ZZH ANESTHESIA TECHNICAL FEE, EACH ADDTL 15 MIN: Performed by: ORTHOPAEDIC SURGERY

## 2019-03-14 PROCEDURE — 97530 THERAPEUTIC ACTIVITIES: CPT | Mod: GP

## 2019-03-14 PROCEDURE — 25000132 ZZH RX MED GY IP 250 OP 250 PS 637: Performed by: ORTHOPAEDIC SURGERY

## 2019-03-14 PROCEDURE — 93005 ELECTROCARDIOGRAM TRACING: CPT

## 2019-03-14 PROCEDURE — 37000008 ZZH ANESTHESIA TECHNICAL FEE, 1ST 30 MIN: Performed by: ORTHOPAEDIC SURGERY

## 2019-03-14 PROCEDURE — 36000093 ZZH SURGERY LEVEL 4 1ST 30 MIN: Performed by: ORTHOPAEDIC SURGERY

## 2019-03-14 PROCEDURE — 84484 ASSAY OF TROPONIN QUANT: CPT | Performed by: INTERNAL MEDICINE

## 2019-03-14 PROCEDURE — 27110028 ZZH OR GENERAL SUPPLY NON-STERILE: Performed by: ORTHOPAEDIC SURGERY

## 2019-03-14 PROCEDURE — 25000125 ZZHC RX 250: Performed by: PHYSICIAN ASSISTANT

## 2019-03-14 DEVICE — IMPLANTABLE DEVICE: Type: IMPLANTABLE DEVICE | Site: KNEE | Status: FUNCTIONAL

## 2019-03-14 DEVICE — BONE CEMENT SIMPLEX W/TOBRAMYCIN 6197-9-001: Type: IMPLANTABLE DEVICE | Site: KNEE | Status: FUNCTIONAL

## 2019-03-14 RX ORDER — FENTANYL CITRATE 50 UG/ML
25-50 INJECTION, SOLUTION INTRAMUSCULAR; INTRAVENOUS
Status: DISCONTINUED | OUTPATIENT
Start: 2019-03-14 | End: 2019-03-14 | Stop reason: HOSPADM

## 2019-03-14 RX ORDER — CEFAZOLIN SODIUM 2 G/100ML
2 INJECTION, SOLUTION INTRAVENOUS EVERY 8 HOURS
Status: COMPLETED | OUTPATIENT
Start: 2019-03-14 | End: 2019-03-14

## 2019-03-14 RX ORDER — PROPOFOL 10 MG/ML
INJECTION, EMULSION INTRAVENOUS PRN
Status: DISCONTINUED | OUTPATIENT
Start: 2019-03-14 | End: 2019-03-14

## 2019-03-14 RX ORDER — SODIUM CHLORIDE, SODIUM LACTATE, POTASSIUM CHLORIDE, CALCIUM CHLORIDE 600; 310; 30; 20 MG/100ML; MG/100ML; MG/100ML; MG/100ML
INJECTION, SOLUTION INTRAVENOUS CONTINUOUS
Status: DISCONTINUED | OUTPATIENT
Start: 2019-03-14 | End: 2019-03-14 | Stop reason: HOSPADM

## 2019-03-14 RX ORDER — SODIUM CHLORIDE, SODIUM LACTATE, POTASSIUM CHLORIDE, CALCIUM CHLORIDE 600; 310; 30; 20 MG/100ML; MG/100ML; MG/100ML; MG/100ML
INJECTION, SOLUTION INTRAVENOUS CONTINUOUS
Status: DISCONTINUED | OUTPATIENT
Start: 2019-03-14 | End: 2019-03-16 | Stop reason: HOSPADM

## 2019-03-14 RX ORDER — PROPOFOL 10 MG/ML
INJECTION, EMULSION INTRAVENOUS CONTINUOUS PRN
Status: DISCONTINUED | OUTPATIENT
Start: 2019-03-14 | End: 2019-03-14

## 2019-03-14 RX ORDER — PRAVASTATIN SODIUM 20 MG
40 TABLET ORAL DAILY
Status: DISCONTINUED | OUTPATIENT
Start: 2019-03-14 | End: 2019-03-16 | Stop reason: HOSPADM

## 2019-03-14 RX ORDER — ACETAMINOPHEN 325 MG/1
650 TABLET ORAL EVERY 4 HOURS PRN
Status: DISCONTINUED | OUTPATIENT
Start: 2019-03-17 | End: 2019-03-16 | Stop reason: HOSPADM

## 2019-03-14 RX ORDER — ONDANSETRON 2 MG/ML
4 INJECTION INTRAMUSCULAR; INTRAVENOUS EVERY 30 MIN PRN
Status: DISCONTINUED | OUTPATIENT
Start: 2019-03-14 | End: 2019-03-14 | Stop reason: HOSPADM

## 2019-03-14 RX ORDER — HYDROXYZINE HYDROCHLORIDE 25 MG/1
25 TABLET, FILM COATED ORAL EVERY 6 HOURS PRN
Status: DISCONTINUED | OUTPATIENT
Start: 2019-03-14 | End: 2019-03-16 | Stop reason: HOSPADM

## 2019-03-14 RX ORDER — MEPERIDINE HYDROCHLORIDE 50 MG/ML
12.5 INJECTION INTRAMUSCULAR; INTRAVENOUS; SUBCUTANEOUS
Status: DISCONTINUED | OUTPATIENT
Start: 2019-03-14 | End: 2019-03-14 | Stop reason: HOSPADM

## 2019-03-14 RX ORDER — OXYCODONE HYDROCHLORIDE 5 MG/1
5-10 TABLET ORAL
Status: DISCONTINUED | OUTPATIENT
Start: 2019-03-14 | End: 2019-03-16 | Stop reason: HOSPADM

## 2019-03-14 RX ORDER — METOPROLOL TARTRATE 1 MG/ML
1-2 INJECTION, SOLUTION INTRAVENOUS EVERY 5 MIN PRN
Status: DISCONTINUED | OUTPATIENT
Start: 2019-03-14 | End: 2019-03-14 | Stop reason: HOSPADM

## 2019-03-14 RX ORDER — HYDROMORPHONE HYDROCHLORIDE 1 MG/ML
.3-.5 INJECTION, SOLUTION INTRAMUSCULAR; INTRAVENOUS; SUBCUTANEOUS EVERY 5 MIN PRN
Status: DISCONTINUED | OUTPATIENT
Start: 2019-03-14 | End: 2019-03-14 | Stop reason: HOSPADM

## 2019-03-14 RX ORDER — DEXTROSE MONOHYDRATE 25 G/50ML
25-50 INJECTION, SOLUTION INTRAVENOUS
Status: DISCONTINUED | OUTPATIENT
Start: 2019-03-14 | End: 2019-03-16 | Stop reason: HOSPADM

## 2019-03-14 RX ORDER — HYDRALAZINE HYDROCHLORIDE 20 MG/ML
2.5-5 INJECTION INTRAMUSCULAR; INTRAVENOUS EVERY 10 MIN PRN
Status: DISCONTINUED | OUTPATIENT
Start: 2019-03-14 | End: 2019-03-14 | Stop reason: HOSPADM

## 2019-03-14 RX ORDER — METOPROLOL TARTRATE 1 MG/ML
INJECTION, SOLUTION INTRAVENOUS PRN
Status: DISCONTINUED | OUTPATIENT
Start: 2019-03-14 | End: 2019-03-14

## 2019-03-14 RX ORDER — ONDANSETRON 4 MG/1
4 TABLET, ORALLY DISINTEGRATING ORAL EVERY 6 HOURS PRN
Status: DISCONTINUED | OUTPATIENT
Start: 2019-03-14 | End: 2019-03-16 | Stop reason: HOSPADM

## 2019-03-14 RX ORDER — DIMENHYDRINATE 50 MG/ML
25 INJECTION, SOLUTION INTRAMUSCULAR; INTRAVENOUS
Status: DISCONTINUED | OUTPATIENT
Start: 2019-03-14 | End: 2019-03-14 | Stop reason: HOSPADM

## 2019-03-14 RX ORDER — CEFAZOLIN SODIUM 2 G/100ML
2 INJECTION, SOLUTION INTRAVENOUS
Status: COMPLETED | OUTPATIENT
Start: 2019-03-14 | End: 2019-03-14

## 2019-03-14 RX ORDER — METOPROLOL SUCCINATE 25 MG/1
25 TABLET, EXTENDED RELEASE ORAL DAILY
Status: DISCONTINUED | OUTPATIENT
Start: 2019-03-14 | End: 2019-03-16 | Stop reason: HOSPADM

## 2019-03-14 RX ORDER — CEFAZOLIN SODIUM 1 G/3ML
1 INJECTION, POWDER, FOR SOLUTION INTRAMUSCULAR; INTRAVENOUS SEE ADMIN INSTRUCTIONS
Status: DISCONTINUED | OUTPATIENT
Start: 2019-03-14 | End: 2019-03-14 | Stop reason: HOSPADM

## 2019-03-14 RX ORDER — HYDROMORPHONE HYDROCHLORIDE 1 MG/ML
.3-.5 INJECTION, SOLUTION INTRAMUSCULAR; INTRAVENOUS; SUBCUTANEOUS
Status: DISCONTINUED | OUTPATIENT
Start: 2019-03-14 | End: 2019-03-16 | Stop reason: HOSPADM

## 2019-03-14 RX ORDER — ALBUTEROL SULFATE 0.83 MG/ML
2.5 SOLUTION RESPIRATORY (INHALATION) EVERY 4 HOURS PRN
Status: DISCONTINUED | OUTPATIENT
Start: 2019-03-14 | End: 2019-03-14 | Stop reason: HOSPADM

## 2019-03-14 RX ORDER — GLYCOPYRROLATE 0.2 MG/ML
INJECTION, SOLUTION INTRAMUSCULAR; INTRAVENOUS PRN
Status: DISCONTINUED | OUTPATIENT
Start: 2019-03-14 | End: 2019-03-14

## 2019-03-14 RX ORDER — ONDANSETRON 4 MG/1
4 TABLET, ORALLY DISINTEGRATING ORAL EVERY 30 MIN PRN
Status: DISCONTINUED | OUTPATIENT
Start: 2019-03-14 | End: 2019-03-14 | Stop reason: HOSPADM

## 2019-03-14 RX ORDER — CLONIDINE 100 UG/ML
INJECTION, SOLUTION EPIDURAL PRN
Status: DISCONTINUED | OUTPATIENT
Start: 2019-03-14 | End: 2019-03-14

## 2019-03-14 RX ORDER — ZOLPIDEM TARTRATE 5 MG/1
5-10 TABLET ORAL
Status: DISCONTINUED | OUTPATIENT
Start: 2019-03-14 | End: 2019-03-16 | Stop reason: HOSPADM

## 2019-03-14 RX ORDER — DEXAMETHASONE SODIUM PHOSPHATE 4 MG/ML
INJECTION, SOLUTION INTRA-ARTICULAR; INTRALESIONAL; INTRAMUSCULAR; INTRAVENOUS; SOFT TISSUE PRN
Status: DISCONTINUED | OUTPATIENT
Start: 2019-03-14 | End: 2019-03-14

## 2019-03-14 RX ORDER — NICOTINE POLACRILEX 4 MG
15-30 LOZENGE BUCCAL
Status: DISCONTINUED | OUTPATIENT
Start: 2019-03-14 | End: 2019-03-16 | Stop reason: HOSPADM

## 2019-03-14 RX ORDER — KETOROLAC TROMETHAMINE 30 MG/ML
30 INJECTION, SOLUTION INTRAMUSCULAR; INTRAVENOUS EVERY 6 HOURS PRN
Status: DISCONTINUED | OUTPATIENT
Start: 2019-03-14 | End: 2019-03-16 | Stop reason: HOSPADM

## 2019-03-14 RX ORDER — FENTANYL CITRATE 50 UG/ML
INJECTION, SOLUTION INTRAMUSCULAR; INTRAVENOUS PRN
Status: DISCONTINUED | OUTPATIENT
Start: 2019-03-14 | End: 2019-03-14

## 2019-03-14 RX ORDER — ONDANSETRON 2 MG/ML
INJECTION INTRAMUSCULAR; INTRAVENOUS PRN
Status: DISCONTINUED | OUTPATIENT
Start: 2019-03-14 | End: 2019-03-14

## 2019-03-14 RX ORDER — AMOXICILLIN 250 MG
1 CAPSULE ORAL 2 TIMES DAILY
Status: DISCONTINUED | OUTPATIENT
Start: 2019-03-14 | End: 2019-03-16 | Stop reason: HOSPADM

## 2019-03-14 RX ORDER — LIDOCAINE 40 MG/G
CREAM TOPICAL
Status: DISCONTINUED | OUTPATIENT
Start: 2019-03-14 | End: 2019-03-16 | Stop reason: HOSPADM

## 2019-03-14 RX ORDER — ONDANSETRON 2 MG/ML
4 INJECTION INTRAMUSCULAR; INTRAVENOUS EVERY 6 HOURS PRN
Status: DISCONTINUED | OUTPATIENT
Start: 2019-03-14 | End: 2019-03-16 | Stop reason: HOSPADM

## 2019-03-14 RX ORDER — NALOXONE HYDROCHLORIDE 0.4 MG/ML
.1-.4 INJECTION, SOLUTION INTRAMUSCULAR; INTRAVENOUS; SUBCUTANEOUS
Status: DISCONTINUED | OUTPATIENT
Start: 2019-03-14 | End: 2019-03-16 | Stop reason: HOSPADM

## 2019-03-14 RX ORDER — ACETAMINOPHEN 325 MG/1
975 TABLET ORAL EVERY 8 HOURS
Status: DISCONTINUED | OUTPATIENT
Start: 2019-03-14 | End: 2019-03-16 | Stop reason: HOSPADM

## 2019-03-14 RX ORDER — LISINOPRIL 40 MG/1
40 TABLET ORAL DAILY
Status: DISCONTINUED | OUTPATIENT
Start: 2019-03-14 | End: 2019-03-16 | Stop reason: HOSPADM

## 2019-03-14 RX ORDER — NEOSTIGMINE METHYLSULFATE 1 MG/ML
VIAL (ML) INJECTION PRN
Status: DISCONTINUED | OUTPATIENT
Start: 2019-03-14 | End: 2019-03-14

## 2019-03-14 RX ORDER — HYDROMORPHONE HYDROCHLORIDE 1 MG/ML
.3-.5 INJECTION, SOLUTION INTRAMUSCULAR; INTRAVENOUS; SUBCUTANEOUS EVERY 10 MIN PRN
Status: DISCONTINUED | OUTPATIENT
Start: 2019-03-14 | End: 2019-03-14 | Stop reason: HOSPADM

## 2019-03-14 RX ORDER — NALOXONE HYDROCHLORIDE 0.4 MG/ML
.1-.4 INJECTION, SOLUTION INTRAMUSCULAR; INTRAVENOUS; SUBCUTANEOUS
Status: DISCONTINUED | OUTPATIENT
Start: 2019-03-14 | End: 2019-03-14

## 2019-03-14 RX ORDER — FENTANYL CITRATE 50 UG/ML
25-50 INJECTION, SOLUTION INTRAMUSCULAR; INTRAVENOUS
Status: DISCONTINUED | OUTPATIENT
Start: 2019-03-14 | End: 2019-03-14

## 2019-03-14 RX ORDER — NALOXONE HYDROCHLORIDE 0.4 MG/ML
.1-.4 INJECTION, SOLUTION INTRAMUSCULAR; INTRAVENOUS; SUBCUTANEOUS
Status: DISCONTINUED | OUTPATIENT
Start: 2019-03-14 | End: 2019-03-14 | Stop reason: HOSPADM

## 2019-03-14 RX ORDER — AMOXICILLIN 250 MG
2 CAPSULE ORAL 2 TIMES DAILY
Status: DISCONTINUED | OUTPATIENT
Start: 2019-03-14 | End: 2019-03-16 | Stop reason: HOSPADM

## 2019-03-14 RX ADMIN — FENTANYL CITRATE 50 MCG: 50 INJECTION, SOLUTION INTRAMUSCULAR; INTRAVENOUS at 10:54

## 2019-03-14 RX ADMIN — Medication 0.5 MG: at 16:07

## 2019-03-14 RX ADMIN — OMEPRAZOLE 40 MG: 20 CAPSULE, DELAYED RELEASE ORAL at 13:07

## 2019-03-14 RX ADMIN — Medication 0.5 MG: at 12:50

## 2019-03-14 RX ADMIN — LISINOPRIL 40 MG: 40 TABLET ORAL at 13:09

## 2019-03-14 RX ADMIN — PRAVASTATIN SODIUM 40 MG: 20 TABLET ORAL at 13:08

## 2019-03-14 RX ADMIN — DEXAMETHASONE SODIUM PHOSPHATE 8 MG: 4 INJECTION, SOLUTION INTRA-ARTICULAR; INTRALESIONAL; INTRAMUSCULAR; INTRAVENOUS; SOFT TISSUE at 07:35

## 2019-03-14 RX ADMIN — SODIUM CHLORIDE, POTASSIUM CHLORIDE, SODIUM LACTATE AND CALCIUM CHLORIDE: 600; 310; 30; 20 INJECTION, SOLUTION INTRAVENOUS at 22:45

## 2019-03-14 RX ADMIN — HYDROXYZINE HYDROCHLORIDE 25 MG: 25 TABLET ORAL at 17:41

## 2019-03-14 RX ADMIN — Medication 3 MG: at 09:45

## 2019-03-14 RX ADMIN — SODIUM CHLORIDE, POTASSIUM CHLORIDE, SODIUM LACTATE AND CALCIUM CHLORIDE: 600; 310; 30; 20 INJECTION, SOLUTION INTRAVENOUS at 08:00

## 2019-03-14 RX ADMIN — ONDANSETRON 4 MG: 2 INJECTION INTRAMUSCULAR; INTRAVENOUS at 09:15

## 2019-03-14 RX ADMIN — Medication 0.5 MG: at 19:30

## 2019-03-14 RX ADMIN — ASPIRIN 325 MG: 325 TABLET, DELAYED RELEASE ORAL at 19:29

## 2019-03-14 RX ADMIN — PROPOFOL 200 MG: 10 INJECTION, EMULSION INTRAVENOUS at 07:35

## 2019-03-14 RX ADMIN — Medication 0.5 MG: at 10:30

## 2019-03-14 RX ADMIN — FENTANYL CITRATE 50 MCG: 50 INJECTION, SOLUTION INTRAMUSCULAR; INTRAVENOUS at 11:32

## 2019-03-14 RX ADMIN — HYDROMORPHONE HYDROCHLORIDE 0.5 MG: 1 INJECTION, SOLUTION INTRAMUSCULAR; INTRAVENOUS; SUBCUTANEOUS at 08:45

## 2019-03-14 RX ADMIN — CEFAZOLIN SODIUM 2 G: 2 INJECTION, SOLUTION INTRAVENOUS at 14:51

## 2019-03-14 RX ADMIN — SODIUM CHLORIDE, POTASSIUM CHLORIDE, SODIUM LACTATE AND CALCIUM CHLORIDE: 600; 310; 30; 20 INJECTION, SOLUTION INTRAVENOUS at 09:15

## 2019-03-14 RX ADMIN — Medication 1 G: at 07:41

## 2019-03-14 RX ADMIN — SENNOSIDES AND DOCUSATE SODIUM 1 TABLET: 8.6; 5 TABLET ORAL at 19:30

## 2019-03-14 RX ADMIN — HYDROMORPHONE HYDROCHLORIDE 0.5 MG: 1 INJECTION, SOLUTION INTRAMUSCULAR; INTRAVENOUS; SUBCUTANEOUS at 08:00

## 2019-03-14 RX ADMIN — ACETAMINOPHEN 975 MG: 325 TABLET, FILM COATED ORAL at 13:07

## 2019-03-14 RX ADMIN — GLYCOPYRROLATE 0.2 MG: 0.2 INJECTION, SOLUTION INTRAMUSCULAR; INTRAVENOUS at 09:45

## 2019-03-14 RX ADMIN — METOPROLOL TARTRATE 1 MG: 1 INJECTION, SOLUTION INTRAVENOUS at 08:30

## 2019-03-14 RX ADMIN — PROPOFOL 50 MG: 10 INJECTION, EMULSION INTRAVENOUS at 09:08

## 2019-03-14 RX ADMIN — FENTANYL CITRATE 50 MCG: 50 INJECTION, SOLUTION INTRAMUSCULAR; INTRAVENOUS at 10:48

## 2019-03-14 RX ADMIN — ACETAMINOPHEN 975 MG: 325 TABLET, FILM COATED ORAL at 21:32

## 2019-03-14 RX ADMIN — HYDROMORPHONE HYDROCHLORIDE 1 MG: 1 INJECTION, SOLUTION INTRAMUSCULAR; INTRAVENOUS; SUBCUTANEOUS at 07:49

## 2019-03-14 RX ADMIN — FENTANYL CITRATE 250 MCG: 50 INJECTION, SOLUTION INTRAMUSCULAR; INTRAVENOUS at 07:35

## 2019-03-14 RX ADMIN — ROCURONIUM BROMIDE 50 MG: 10 INJECTION INTRAVENOUS at 07:35

## 2019-03-14 RX ADMIN — METOPROLOL TARTRATE 4 MG: 1 INJECTION, SOLUTION INTRAVENOUS at 08:20

## 2019-03-14 RX ADMIN — HYDRALAZINE HYDROCHLORIDE 5 MG: 20 INJECTION INTRAMUSCULAR; INTRAVENOUS at 10:27

## 2019-03-14 RX ADMIN — OXYCODONE HYDROCHLORIDE 5 MG: 5 TABLET ORAL at 21:32

## 2019-03-14 RX ADMIN — SODIUM CHLORIDE, POTASSIUM CHLORIDE, SODIUM LACTATE AND CALCIUM CHLORIDE: 600; 310; 30; 20 INJECTION, SOLUTION INTRAVENOUS at 07:29

## 2019-03-14 RX ADMIN — SODIUM CHLORIDE, POTASSIUM CHLORIDE, SODIUM LACTATE AND CALCIUM CHLORIDE: 600; 310; 30; 20 INJECTION, SOLUTION INTRAVENOUS at 11:50

## 2019-03-14 RX ADMIN — FENTANYL CITRATE 50 MCG: 50 INJECTION, SOLUTION INTRAMUSCULAR; INTRAVENOUS at 09:45

## 2019-03-14 RX ADMIN — CEFAZOLIN SODIUM 2 G: 2 INJECTION, SOLUTION INTRAVENOUS at 22:51

## 2019-03-14 RX ADMIN — METOPROLOL SUCCINATE 25 MG: 25 TABLET, EXTENDED RELEASE ORAL at 13:08

## 2019-03-14 RX ADMIN — CLONIDINE HYDROCHLORIDE 1 MG: 0.1 INJECTION, SOLUTION EPIDURAL at 09:36

## 2019-03-14 RX ADMIN — KETOROLAC TROMETHAMINE 30 MG: 30 INJECTION, SOLUTION INTRAMUSCULAR at 17:41

## 2019-03-14 RX ADMIN — PROPOFOL 100 MCG/KG/MIN: 10 INJECTION, EMULSION INTRAVENOUS at 07:37

## 2019-03-14 RX ADMIN — CEFAZOLIN SODIUM 2 G: 2 INJECTION, SOLUTION INTRAVENOUS at 07:29

## 2019-03-14 RX ADMIN — FENTANYL CITRATE 50 MCG: 50 INJECTION, SOLUTION INTRAMUSCULAR; INTRAVENOUS at 12:18

## 2019-03-14 RX ADMIN — GLYCOPYRROLATE 0.2 MG: 0.2 INJECTION, SOLUTION INTRAMUSCULAR; INTRAVENOUS at 07:35

## 2019-03-14 ASSESSMENT — MIFFLIN-ST. JEOR: SCORE: 2015.6

## 2019-03-14 ASSESSMENT — ACTIVITIES OF DAILY LIVING (ADL)
ADLS_ACUITY_SCORE: 13
ADLS_ACUITY_SCORE: 13

## 2019-03-14 NOTE — ANESTHESIA PROCEDURE NOTES
Peripheral nerve/Neuraxial procedure note : Femoral  Pre-Procedure  Performed by Joe Justice, DO  Referred by MD JE  Location: pre-op    Procedure Times:3/14/2019 7:11 AM and 3/14/2019 7:21 AM  Pre-Anesthestic Checklist: patient identified, IV checked, site marked, risks and benefits discussed, informed consent, monitors and equipment checked, pre-op evaluation, at physician/surgeon's request and post-op pain management    Timeout  Correct Patient: Yes   Correct Procedure: Yes   Correct Site: Yes   Correct Laterality: Yes   Correct Position: Yes   Site Marked: Yes   .   Procedure Documentation    .    Procedure:  left  Femoral.     Ultrasound used to identify targeted nerve, plexus, or vascular marker and placed a needle adjacent to it., Ultrasound was used to visualize the spread of the anesthetic in close proximity to the above stated nerve.   Patient Prep;mask, sterile gloves, povidone-iodine 7.5% surgical scrub.  Nerve Stim: Initial Level 5 mA. Lowest motor response mA..  Needle: insulated  Needle Length (Inches) 3.13  Insertion Method: Single Shot.       Assessment/Narrative  Paresthesias: No.  .  The placement was negative for: blood aspirated, painful injection and site bleeding.  Bolus given via needle..   Secured via.   Complications: none. Comments:  .The surgeon has given a verbal order transferring care of this patient to me for the performance of a regional analgesia block for post-op pain control. It is requested of me because I am uniquely trained and qualified to perform this block and the surgeon is neither trained nor qualified to perform this procedure.    .Femoral Nerve Block    Medication- Bupivicaine 0.75% 24cc + Lido 2% w/ epi 1:200k 6cc    MANUEL Justice

## 2019-03-14 NOTE — OP NOTE
Procedure Date: 03/14/2019      PREOPERATIVE DIAGNOSIS:  Osteoarthritis of the left knee.         POSTOPERATIVE DIAGNOSIS:  Osteoarthritis of the left knee.         PROCEDURE PERFORMED:  Left total knee arthroplasty.        IMPLANTS:  This was done using the Attune posterior stabilized rotating platform knee system, size 6 femur, #7 tibia, 12 mm of tibial polyethylene, 41 mm patellar button.      INDICATIONS FOR PROCEDURE:  The patient is a 58-year-old male with a long history of bilateral osteoarthritis of the knee.  He previously had his right knee done and did very well.  I saw the patient in consultation and discussed treatment options.  I explained to him the risks, benefits, potential complications of total knee arthroplasty.  This discussion included but not specific to infection, vascular neurologic complications, DVT, septic and aseptic loosening, arthrofibrosis, fracture and the possible need for further revision surgery.  After this discussion, the patient wishes to proceed with surgery.      ANESTHESIA:  General.        ASSISTANT:  Gary Tabares PA-C      DESCRIPTION OF PROCEDURE:  The patient was taken to the operating room, placed in a snap OR table in supine position.  After induction of a general anesthetic, a bump was placed beneath the left hip and a tourniquet applied to left thigh.  The patient was given 2 grams Ancef for infection prophylaxis given 1 hour prior to incision.  We then performed a sterile prep and drape of the left knee and left lower extremity.  After sterile prep and drape, the limb was exsanguinated, tourniquet was elevated to 300 mmHg.  I made a midline incision exposing the extensor mechanism.  We proceeded with a medial parapatellar arthrotomy and quad splitting approach, identified the entry point of the traversing femur, set at 5 degrees of valgus, made my distal femoral cut.  We then sized the femur appropriately and applied the jig for 3 degrees of external rotation of the  femoral component.  We then made our anterior and posterior cuts along with anterior, posterior chamfers.  We then directed our attention to the tibia and used extramedullary guide to establish a neutral cut of the tibia based off the deficient medial side in order to correct the varus deformity and release it off the medial side tibia.  Once the tibial cut was made, we checked our flexion and extension gaps and found it to be equal then finished preparation of the femur by making a box cut and finished preparation of the tibia.  We then sized the patella, made our patellar cuts, finished preparation of the patella.  The cement was being mixed, we began preparing the cement surfaces using pulsatile lavage with antibiotic saline.  We also injected the posterior capsule with a mixture of ropivacaine and Toradol.  We also removed the posterior osteophytes off the femur.  Once the cement was of appropriate consistency, we cemented first the tibial component followed by the femoral component.  Once these components were fully seated, excess cement was removed using [place] elevator.  We then placed the knee in full extension with trial polyethylene in place and allowed the cement to harden.        At this point, we cemented the patella, again removing the excess cement using [place] elevator.  Once the cement had hardened, the knee through a range of motion.  Patella tracked ideally with good soft tissue balance in flexion and extension.  We then removed the trial polyethylene, inspected the joint for loose bone cement and removed what was found.  We then put in the actual rotating platform tibial polyethylene, reduced the knee, again checked for patellar tracking and found to be ideal.  We then placed the knee in approximately 30 degrees of flexion, put a medium Hemovac drain deep to fascia and closed the arthrotomy using #0 Ethibond sutures.  This was oversewn using #0 Stratafix.  The deep subcutaneous was closed using #0  Vicryl, superficial subcutaneous was closed with 2-0 Vicryl.  The skin was closed with running 3-0 Monocryl subcuticular stitch followed by a light Prineo dressing followed by a sterile dressing and a knee immobilizer.        The patient tolerated the operative procedure.  There were no intraoperative complications.  The patient [facility] to [facility].  Plan will be for the patient to get 24 hours of Ancef for infection prophylaxis, 30 days of aspirin for DVT prophylaxis.         MARIAH WOOTEN MD             D: 2019   T: 2019   MT: SHIRLEY      Name:     MAKAYLA LAO   MRN:      [id number]        Account:        [id number]   :      1960           Procedure Date: 2019      Document: [id number]

## 2019-03-14 NOTE — ADDENDUM NOTE
Addendum  created 03/14/19 1217 by Joe Justice, DO    Child order released for a procedure order, Intraprocedure Blocks edited, Sign clinical note

## 2019-03-14 NOTE — PROGRESS NOTES
03/14/19 1633   Quick Adds   Type of Visit Initial PT Evaluation   Living Environment   Lives With spouse   Living Environment Comment Pt lives in [facility] with bilateral rails   Self-Care   Usual Activity Tolerance moderate   Current Activity Tolerance poor   Regular Exercise No   Activity/Exercise/Self-Care Comment Pt IND at baseline, limited by L knee pain. Work labor intensive, drives forklift    Functional Level Prior   Ambulation 0-->independent   Transferring 0-->independent   Toileting 0-->independent   Bathing 0-->independent   Communication 0-->understands/communicates without difficulty   Fall history within last six months no   Prior Functional Level Comment Pt has all equipment needs met at home. SEC, FWW. IND prior to surgery   General Information   Onset of Illness/Injury or Date of Surgery - Date 03/14/19   Referring Physician Zulema Lacey MD   Pertinent History of Current Problem (include personal factors and/or comorbidities that impact the POC) This patient is a 58 year old male with past medical history significant for hypertension, hyperlipidemia, GERD, chronic pain, DJD, pre-diabetes admitted for elective left total knee arthroplasty   Weight-Bearing Status - LLE weight-bearing as tolerated   General Observations Has FWW from home in his room    General Info Comments Of note, per RN pt had episode of low BP and reported chest tightness. Monitored symptoms during session. BP stable, no c/o chest tightness   Cognitive Status Examination   Orientation orientation to person, place and time   Pain Assessment   Patient Currently in Pain Yes, see Vital Sign flowsheet   Range of Motion (ROM)   ROM Comment L knee 0-70* AROM supine    Strength   Strength Comments Poor quad ms activation LLE, unable to perform LLE SLR    Bed Mobility   Bed Mobility Comments Supine > sit with CGA to mobilize LLE   Transfer Skills   Transfer Comments STS with FWW and close CGA KI donned   Gait   Gait Comments  "Not initiated 2/2 high pain levels and RLE buckling   Balance   Balance Comments [place] BUE support on FWW to maintain standing balance   Sensory Examination   Sensory Perception Comments INtact to light touch    General Therapy Interventions   Planned Therapy Interventions bed mobility training;balance training;gait training;neuromuscular re-education;ROM;strengthening;transfer training;stretching   Clinical Impression   Criteria for Skilled Therapeutic Intervention yes, treatment indicated   PT Diagnosis Impaired functional mobility   Influenced by the following impairments Pain, weakness, balance, endurance    Functional limitations due to impairments Impaired functional mobility   Clinical Presentation Stable/Uncomplicated   Clinical Presentation Rationale Good family support, IND prior   Clinical Decision Making (Complexity) Low complexity   Therapy Frequency` 2 times/day   Predicted Duration of Therapy Intervention (days/wks) 3 days   Anticipated Discharge Disposition Home with Outpatient Therapy   Risk & Benefits of therapy have been explained Yes   Patient, Family & other staff in agreement with plan of care Yes   [facility] AM-PAC  \"6 Clicks\" V.2 Basic Mobility Inpatient Short Form   1. Turning from your back to your side while in a flat bed without using bedrails? 3 - A Little   2. Moving from lying on your back to sitting on the side of a flat bed without using bedrails? 3 - A Little   3. Moving to and from a bed to a chair (including a wheelchair)? 3 - A Little   4. Standing up from a chair using your arms (e.g., wheelchair, or bedside chair)? 3 - A Little   5. To walk in hospital room? 3 - A Little   6. Climbing 3-5 steps with a railing? 2 - A Lot   Basic Mobility Raw Score (Score out of 24.Lower scores equate to lower levels of function) 17   Total Evaluation Time   Total Evaluation Time (Minutes) 15     "

## 2019-03-14 NOTE — ANESTHESIA POSTPROCEDURE EVALUATION
Patient: Ab Aguilar    Procedure(s):  Left total knee arthroplasty (choice anesthesia)    Diagnosis:DJD  Diagnosis Additional Information:   Left total knee arthroplasty.        Anesthesia Type:  Periph. Nerve Block for postop pain, Spinal    Note:  Anesthesia Post Evaluation    Patient location during evaluation: PACU  Patient participation: Able to fully participate in evaluation  Level of consciousness: awake  Pain management: adequate  Airway patency: patent  Cardiovascular status: acceptable  Respiratory status: acceptable  Hydration status: acceptable  PONV: controlled     Anesthetic complications: None          Last vitals:  Vitals:    03/14/19 1141 03/14/19 1145 03/14/19 1200   BP:  123/86 (!) 122/91   Pulse:  93    Resp: 9 15 10   Temp:      SpO2: (!) 88% 94% 96%         Electronically Signed By: Joe Justice DO  March 14, 2019  12:12 PM

## 2019-03-14 NOTE — ANESTHESIA PREPROCEDURE EVALUATION
Anesthesia Pre-Procedure Evaluation    Patient: Ab Aguilar   MRN: [id number] : 1960          Preoperative Diagnosis: DJD    Procedure(s):  Left total knee arthroplasty (choice anesthesia)    Past Medical History:   Diagnosis Date     Esophageal reflux      Essential hypertension, benign      Major depression      Obesity      Obesity (BMI 35.0-35.9 with comorbidity) (H)      Other and unspecified hyperlipidemia      Other chronic pain     lt knee     Prediabetes 12    A1C 6.1     Past Surgical History:   Procedure Laterality Date     C NONSPECIFIC PROCEDURE      R ankle fracture, repair     C NONSPECIFIC PROCEDURE  age 6    B inguinal hernia repair     C NONSPECIFIC PROCEDURE      lumbar laminectomy     ORTHOPEDIC SURGERY      rt knee replacement , rt rotator cuff repair 6 years ago     STRESS ECHO ([facility])  10/02    normal      STRESS ECHO ([facility])      normal stress echo     Anesthesia Evaluation     .             ROS/MED HX    ENT/Pulmonary:  - neg pulmonary ROS     Neurologic:  - neg neurologic ROS     Cardiovascular:     (+) hypertension----. : . . . :. .       METS/Exercise Tolerance:     Hematologic:  - neg hematologic  ROS       Musculoskeletal:  - neg musculoskeletal ROS       GI/Hepatic:     (+) GERD       Renal/Genitourinary: Comment: Urinary retention? - ROS Renal section negative       Endo: Comment: .Body mass index is 37.31 kg/m .  Pre-DM    (+) Obesity, .      Psychiatric:  - neg psychiatric ROS       Infectious Disease:  - neg infectious disease ROS       Malignancy:         Other: Comment: .Lab Test        19                       1115          0914          0852          WBC          6.3          6.8          7.0           HGB          15.6         15.8         16.1          MCV          89           93           91            PLT          242          294          245            Lab Test        19  "                      1115          0914          0852          NA           138          138          138           POTASSIUM    4.5          4.0          3.8           CHLORIDE     103          103          103           CO2          28           26           29            BUN          14           15           10            CR           0.74         0.74         0.70          ANIONGAP     7            9            6             SAÚL          9.5          8.9          9.3           GLC          94           109*         100*             - neg other ROS                      Physical Exam  Normal systems: cardiovascular and pulmonary    Airway   Mallampati: II    Dental     Cardiovascular   Rhythm and rate: regular and normal      Pulmonary    breath sounds clear to auscultation            Lab Results   Component Value Date    WBC 6.3 03/05/2019    HGB 15.6 03/05/2019    HCT 47.1 03/05/2019     03/05/2019    SED 4 09/26/2012     03/05/2019    POTASSIUM 4.5 03/05/2019    CHLORIDE 103 03/05/2019    CO2 28 03/05/2019    BUN 14 03/05/2019    CR 0.74 03/05/2019    GLC 94 03/05/2019    SAÚL 9.5 03/05/2019    ALBUMIN 4.4 03/05/2019    PROTTOTAL 7.6 03/05/2019    ALT 61 03/05/2019    AST 37 03/05/2019    ALKPHOS 79 03/05/2019    BILITOTAL 0.6 03/05/2019    PTT 31 07/30/2003    TSH 0.76 09/26/2012       Preop Vitals  BP Readings from Last 3 Encounters:   03/14/19 (!) 165/97   03/05/19 140/90   03/12/18 (!) 148/98    Pulse Readings from Last 3 Encounters:   03/14/19 87   03/05/19 100   03/12/18 82      Resp Readings from Last 3 Encounters:   03/14/19 18   03/05/19 16   03/12/18 16    SpO2 Readings from Last 3 Encounters:   03/14/19 98%   03/12/18 97%   01/16/18 96%      Temp Readings from Last 1 Encounters:   03/14/19 97.7  F (36.5  C) (Temporal)    Ht Readings from Last 1 Encounters:   03/14/19 1.778 m (5' 10\")      Wt Readings from Last 1 Encounters:   03/14/19 117.9 kg (260 lb)    Estimated body mass index is " "37.31 kg/m  as calculated from the following:    Height as of this encounter: 1.778 m (5' 10\").    Weight as of this encounter: 117.9 kg (260 lb).       Anesthesia Plan      History & Physical Review  History and physical reviewed and following examination; no interval change.    ASA Status:  2 .        Plan for Periph. Nerve Block for postop pain and General   PONV prophylaxis:  Ondansetron (or other 5HT-3)       Postoperative Care  Postoperative pain management:  IV analgesics, Oral pain medications, Multi-modal analgesia and Peripheral nerve block (Single Shot).      Consents                 Joe Justice DO                    .  "

## 2019-03-14 NOTE — BRIEF OP NOTE
[facility]    Brief Operative Note    Pre-operative diagnosis: DJD  Post-operative diagnosis * No post-op diagnosis entered *  Procedure: Procedure(s):  Left total knee arthroplasty (choice anesthesia)  Surgeon: Surgeon(s) and Role:     * Zulema Lacey MD - Primary     * Khang Tabares PA-C - Assisting  Anesthesia: Combined General with Block   Estimated blood loss: Less than 10 ml  Drains: Hemovac  Specimens: * No specimens in log *  Findings:   None.  Complications: None.  Implants: Materials Involved:  Metalic  Grafts:  None.

## 2019-03-14 NOTE — PLAN OF CARE
Discharge Planner PT   Patient plan for discharge: Home with assist from wife and OP PT   Current status:     Eval complete, treatment indicated. [facility] PT role, POC, KI. KI donned for all OOB mobility 2/2 impaired quad activation LLE. PLOF: IND. Has FWW from home in . Lives in rambler style house    BP stable throughout session with position change, O2 stable on RA see flowsheet. Pt reporting 9/10 pain received pain meds. Denied any chest pain during session. Reported some minor dizziness toward end of session.     Pt engaged in TKA exercises. AAROM SAQ and SLR. L knee ROM 0-70* supine. Supine > sit with CGA. STS CGA, engaged in pregait exercises in standing with UE support on FWW, minor RLE buckling with KI donned noted. Holding gait this day. Sit >supine with CGA. Alarm on and needs in reach CAPNO WNL end of session   Barriers to return to prior living situation: A x 1, pain  Recommendations for discharge: home with OP PT   Rationale for recommendations: Predict pt will progress to be safe to discharge to home with OP PT to progress funcitonal mobility          Entered by: Martha Duran 03/14/2019 4:40 PM

## 2019-03-14 NOTE — CONSULTS
Hospitalist Consultation      Ab Aguilar MRN# [id number]   YOB: 1960 Age: 58 year old   Date of Admission: 3/14/2019     Requesting Physician: Dr. Justice  Reason for consult: Medical Management           Assessment and Plan:   Mr. Berger is a 58-year-old male with past medical history significant for hypertension, hyperlipidemia, GERD, osteoarthritis, chronic pain admitted 3/14/2019 for elective left total knee arthroplasty for DJD.  Medicine team consulted for co management.    Post op patient had an episode of Chest pain.    Chest Pain  -- chest tightness 3/10 tightening/ squeezing pain  -- spontaneously resolved. PAIN  FREE  -- cardiac risk factors: htn, HLD, ? DM  --  EKG normal sinus  -- will check serial troponin  -- cardiac monitor  -- consider michele scan stress test if rules out ACS  -- received ASA earlier today    Status post arthroplasty  --Patient underwent procedure for limiting DJD   --Tolerated procedure well with no complications.  --Pain is well controlled  --Continue capnography  --Pain management, PT. anticoagulation per primary team    Hypertension  --Prior to admission patient on lisinopril and metoprolol  --We will resume home meds with parameters    Hyperlipidemia  --Resume pravastatin    GERD  --Continue PPI    Pre-diabetes  --A1c was 6.1 not on any medications  --We will monitor glucose    Thank you for the consult will continue to follow along.             History of Present Illness:   This patient is a 58 year old male with past medical history significant for hypertension, hyperlipidemia, GERD, chronic pain, DJD, pre-diabetes admitted for elective left total knee arthroplasty.  Patient had sick significant pain it affected his ADLs.  Pain was severe 9-10 out of 10 with ambulation prior to surgery.  He underwent surgery today without any complications.     Postoperatively patient had an episode of chest pain.  Chest pain described as anterior chest squeezing  discomfort 2-3 out of 10 spontaneously resolved.  Per report blood pressure at that time was lower systolic was down to 90s.  Associated with lightheadedness and dizziness no nausea no palpitations no cold sweats no radiation of pain.  No trouble breathing.    Postoperatively left knee pain is 8-9 out of 10 some improvement with pain meds.  Offers no other complaints.  Review of all the other symptoms negative.              Past Medical History:     Past Medical History:   Diagnosis Date     Esophageal reflux      Essential hypertension, benign      Major depression      Obesity      Obesity (BMI 35.0-35.9 with comorbidity) (H) 2012     Other and unspecified hyperlipidemia      Other chronic pain     lt knee     Prediabetes 9/26/12    A1C 6.1             Past Surgical History:     Past Surgical History:   Procedure Laterality Date     C NONSPECIFIC PROCEDURE  1991    R ankle fracture, repair     C NONSPECIFIC PROCEDURE  age 6    B inguinal hernia repair     C NONSPECIFIC PROCEDURE  9/01    lumbar laminectomy     ORTHOPEDIC SURGERY      rt knee replacement 2017, rt rotator cuff repair 6 years ago     STRESS ECHO ([facility])  10/02    normal      STRESS ECHO ([facility])  9/05    normal stress echo               Social History:     Social History     Tobacco Use     Smoking status: Never Smoker     Smokeless tobacco: Never Used   Substance Use Topics     Alcohol use: Yes     Comment: 10-12 beers/week             Family History:     Family History   Problem Relation Age of Onset     Hypertension Father      Heart Disease Brother 37        b. 1967, 1st MI at age 37     Family History Negative Mother      Family History Negative Brother         b. 1965     Family History Negative Sister         b. 1961             Allergies:     Allergies   Allergen Reactions     Atorvastatin GI Disturbance     Abdominal and intestinal pains from atorvastatin             Medications:     Medications Prior to Admission   Medication Sig Dispense  "Refill Last Dose     aspirin 81 MG EC tablet Take 81 mg by mouth daily   2/28/2019     ibuprofen (ADVIL,MOTRIN) 200 MG tablet Take 800 mg by mouth 3 times daily as needed.   2/28/2019     omeprazole (PRILOSEC) 20 MG CR capsule Take 2 capsules (40 mg) by mouth daily 60 capsule 11 3/14/2019     sucralfate (CARAFATE) 1 GM tablet TAKE 1 TABLET(1 GRAM) BY MOUTH FOUR TIMES DAILY AS NEEDED 360 tablet 1 Past Week at Unknown time     zolpidem (AMBIEN) 10 MG tablet Take 0.5-1 tablets (5-10 mg) by mouth nightly as needed for sleep 30 tablet 1 Past Week at Unknown time     lisinopril (PRINIVIL/ZESTRIL) 40 MG tablet Take 1 tablet (40 mg) by mouth daily 90 tablet 3 3/13/2019     metoprolol succinate ER (TOPROL-XL) 25 MG 24 hr tablet TAKE 1 TABLET BY MOUTH DAILY 90 tablet 1 3/14/2019     pravastatin (PRAVACHOL) 40 MG tablet Take 1 tablet (40 mg) by mouth daily 90 tablet 3 3/13/2019               Review of Systems:   A comprehensive greater than 10 system review of systems was carried out.  Pertinent positives and negatives are noted above.  Otherwise negative for contributory info.            Physical Exam:   Vitals were reviewed  Blood pressure 124/80, pulse 94, temperature 98.2  F (36.8  C), temperature source Oral, resp. rate 11, height 1.778 m (5' 10\"), weight 117.9 kg (260 lb), SpO2 96 %.  Exam:   GENERAL:  Comfortable.  PSYCH: pleasant, oriented, No acute distress.  EYES: PERRLA, Normal conjunctiva.  HEART:  Normal S1, S2 with no edema.  LUNGS:  Clear to auscultation, normal Respiratory effort.  ABDOMEN:  Soft, no hepatosplenomegaly, normal bowel sounds.  SKIN:  Dry to touch, No rash.  Neuro: Non focal with normal motor power, sensation, CN's and Reflexes.  EXT: Status post left knee arthroplasty           Data:   Past 24 hours labs, studies, and imaging were reviewed.  All laboratory data reviewed  All laboratory and imaging data in the past 24 hours reviewed    EKG normal sinus with no ST-T changes.  "

## 2019-03-15 ENCOUNTER — APPOINTMENT (OUTPATIENT)
Dept: OCCUPATIONAL THERAPY | Facility: CLINIC | Age: 59
DRG: 470 | End: 2019-03-15
Attending: ORTHOPAEDIC SURGERY
Payer: COMMERCIAL

## 2019-03-15 ENCOUNTER — APPOINTMENT (OUTPATIENT)
Dept: PHYSICAL THERAPY | Facility: CLINIC | Age: 59
DRG: 470 | End: 2019-03-15
Attending: ORTHOPAEDIC SURGERY
Payer: COMMERCIAL

## 2019-03-15 LAB
GLUCOSE BLDC GLUCOMTR-MCNC: 108 MG/DL (ref 70–99)
GLUCOSE BLDC GLUCOMTR-MCNC: 128 MG/DL (ref 70–99)
GLUCOSE BLDC GLUCOMTR-MCNC: 87 MG/DL (ref 70–99)
GLUCOSE SERPL-MCNC: 106 MG/DL (ref 70–99)
GLUCOSE SERPL-MCNC: 124 MG/DL (ref 70–99)
HGB BLD-MCNC: 13.1 G/DL (ref 13.3–17.7)
TROPONIN I SERPL-MCNC: <0.015 UG/L (ref 0–0.04)

## 2019-03-15 PROCEDURE — 00000146 ZZHCL STATISTIC GLUCOSE BY METER IP

## 2019-03-15 PROCEDURE — 97116 GAIT TRAINING THERAPY: CPT | Mod: GP

## 2019-03-15 PROCEDURE — 36415 COLL VENOUS BLD VENIPUNCTURE: CPT | Performed by: INTERNAL MEDICINE

## 2019-03-15 PROCEDURE — 12000000 ZZH R&B MED SURG/OB

## 2019-03-15 PROCEDURE — 97110 THERAPEUTIC EXERCISES: CPT | Mod: GP

## 2019-03-15 PROCEDURE — 25000132 ZZH RX MED GY IP 250 OP 250 PS 637: Performed by: ORTHOPAEDIC SURGERY

## 2019-03-15 PROCEDURE — 36415 COLL VENOUS BLD VENIPUNCTURE: CPT | Performed by: ORTHOPAEDIC SURGERY

## 2019-03-15 PROCEDURE — 85018 HEMOGLOBIN: CPT | Performed by: ORTHOPAEDIC SURGERY

## 2019-03-15 PROCEDURE — 25000128 H RX IP 250 OP 636: Performed by: ORTHOPAEDIC SURGERY

## 2019-03-15 PROCEDURE — 84484 ASSAY OF TROPONIN QUANT: CPT | Performed by: INTERNAL MEDICINE

## 2019-03-15 PROCEDURE — 97165 OT EVAL LOW COMPLEX 30 MIN: CPT | Mod: GO | Performed by: REHABILITATION PRACTITIONER

## 2019-03-15 PROCEDURE — 82947 ASSAY GLUCOSE BLOOD QUANT: CPT | Performed by: ORTHOPAEDIC SURGERY

## 2019-03-15 PROCEDURE — 82947 ASSAY GLUCOSE BLOOD QUANT: CPT | Performed by: INTERNAL MEDICINE

## 2019-03-15 PROCEDURE — 97535 SELF CARE MNGMENT TRAINING: CPT | Mod: GO | Performed by: REHABILITATION PRACTITIONER

## 2019-03-15 RX ORDER — HYDROXYZINE HYDROCHLORIDE 25 MG/1
25 TABLET, FILM COATED ORAL EVERY 6 HOURS PRN
Qty: 30 TABLET | Refills: 0 | Status: SHIPPED | OUTPATIENT
Start: 2019-03-15 | End: 2019-09-04

## 2019-03-15 RX ORDER — AMOXICILLIN 250 MG
1 CAPSULE ORAL 2 TIMES DAILY
Qty: 30 TABLET | Refills: 0 | Status: SHIPPED | OUTPATIENT
Start: 2019-03-15 | End: 2019-09-04

## 2019-03-15 RX ORDER — OXYCODONE HYDROCHLORIDE 5 MG/1
5-10 TABLET ORAL
Qty: 30 TABLET | Refills: 0 | Status: SHIPPED | OUTPATIENT
Start: 2019-03-15 | End: 2019-09-04

## 2019-03-15 RX ORDER — ONDANSETRON 4 MG/1
4 TABLET, ORALLY DISINTEGRATING ORAL EVERY 6 HOURS PRN
Qty: 30 TABLET | Refills: 0 | Status: SHIPPED | OUTPATIENT
Start: 2019-03-15 | End: 2019-09-04

## 2019-03-15 RX ORDER — ASPIRIN 325 MG
325 TABLET, DELAYED RELEASE (ENTERIC COATED) ORAL 2 TIMES DAILY
Qty: 60 TABLET | Refills: 0 | Status: SHIPPED | OUTPATIENT
Start: 2019-03-15 | End: 2019-09-04

## 2019-03-15 RX ADMIN — PRAVASTATIN SODIUM 40 MG: 20 TABLET ORAL at 08:31

## 2019-03-15 RX ADMIN — OMEPRAZOLE 40 MG: 20 CAPSULE, DELAYED RELEASE ORAL at 08:31

## 2019-03-15 RX ADMIN — SENNOSIDES AND DOCUSATE SODIUM 2 TABLET: 8.6; 5 TABLET ORAL at 20:02

## 2019-03-15 RX ADMIN — ASPIRIN 325 MG: 325 TABLET, DELAYED RELEASE ORAL at 20:02

## 2019-03-15 RX ADMIN — HYDROXYZINE HYDROCHLORIDE 25 MG: 25 TABLET ORAL at 00:45

## 2019-03-15 RX ADMIN — SENNOSIDES AND DOCUSATE SODIUM 1 TABLET: 8.6; 5 TABLET ORAL at 08:32

## 2019-03-15 RX ADMIN — ACETAMINOPHEN 975 MG: 325 TABLET, FILM COATED ORAL at 20:59

## 2019-03-15 RX ADMIN — OXYCODONE HYDROCHLORIDE 10 MG: 5 TABLET ORAL at 17:05

## 2019-03-15 RX ADMIN — OXYCODONE HYDROCHLORIDE 10 MG: 5 TABLET ORAL at 10:25

## 2019-03-15 RX ADMIN — ACETAMINOPHEN 975 MG: 325 TABLET, FILM COATED ORAL at 05:11

## 2019-03-15 RX ADMIN — OXYCODONE HYDROCHLORIDE 10 MG: 5 TABLET ORAL at 07:09

## 2019-03-15 RX ADMIN — ASPIRIN 325 MG: 325 TABLET, DELAYED RELEASE ORAL at 08:31

## 2019-03-15 RX ADMIN — OXYCODONE HYDROCHLORIDE 10 MG: 5 TABLET ORAL at 13:04

## 2019-03-15 RX ADMIN — HYDROXYZINE HYDROCHLORIDE 25 MG: 25 TABLET ORAL at 23:08

## 2019-03-15 RX ADMIN — ACETAMINOPHEN 975 MG: 325 TABLET, FILM COATED ORAL at 13:04

## 2019-03-15 RX ADMIN — OXYCODONE HYDROCHLORIDE 10 MG: 5 TABLET ORAL at 23:08

## 2019-03-15 RX ADMIN — OXYCODONE HYDROCHLORIDE 10 MG: 5 TABLET ORAL at 20:02

## 2019-03-15 RX ADMIN — HYDROXYZINE HYDROCHLORIDE 25 MG: 25 TABLET ORAL at 08:31

## 2019-03-15 RX ADMIN — OXYCODONE HYDROCHLORIDE 10 MG: 5 TABLET ORAL at 00:45

## 2019-03-15 RX ADMIN — Medication 0.3 MG: at 09:21

## 2019-03-15 RX ADMIN — HYDROXYZINE HYDROCHLORIDE 25 MG: 25 TABLET ORAL at 17:06

## 2019-03-15 ASSESSMENT — ACTIVITIES OF DAILY LIVING (ADL)
ADLS_ACUITY_SCORE: 13
ADLS_ACUITY_SCORE: 14
ADLS_ACUITY_SCORE: 14
ADLS_ACUITY_SCORE: 13

## 2019-03-15 NOTE — PLAN OF CARE
OT- eval completed and treatment initiated.  Patient is POD #1 for L TKA, KI donned with mobility per PT. Prior to admission, patient was living with spouse in single level home, working as a , patient plans to take 8 weeks off of work to recover. Patient had R TKA Nov '17.    Discharge Planner OT   Patient plan for discharge: home  Current status: Patient familiar techniques, therefore briefly reviewed walker safety, EC/WS techniques, advancement of activity following surgery, car transfers and driving readiness, patient was engaged in instruction and verbalized understanding. I with bed mobility, after review, was I with donning/doffing socks, and donning LE clothing. CGA, fww to stand, SBA, fww to fully don clothing over hips. CGA, fww to transfer to  at bedside. KI donned prior to standing and transfer to .   Barriers to return to prior living situation: none anticipated from OT standpoint  Recommendations for discharge: home with A from spouse and son as needed for IADL's- driving, household chores, errands.  Rationale for recommendations: anticipate patient will meet needed goals for safe return home with spouse A. Will continue with OT for ADL's and AE recommendations.       Entered by: Argelia Nogueira 03/15/2019 2:39 PM

## 2019-03-15 NOTE — PLAN OF CARE
Pt. up with assist of one using knee immobilizer. Castellano and hemovac patent and draining. Pain managed with PRN meds and scheduled tylenol. Blood pressures stable and respirations improved. Denied any chest pain for rest of shift following initial complaint. Troponin levels normal x2. EKG normal. On 2L with capnography.

## 2019-03-15 NOTE — PROGRESS NOTES
"Ab Aguilar  3/15/2019  POD # 1 s/p LTKA  Admit Date:  3/14/2019      Normal healing wound.  Objective: states no chest pain or shortness of breath this morning. States he feels much better than yesterday. The pain in the knee is controlled with oxycodone.   Blood pressure 105/53, pulse 80, temperature 96.6  F (35.9  C), temperature source Axillary, resp. rate 13, height 1.778 m (5' 10\"), weight 117.9 kg (260 lb), SpO2 96 %.    Temperatures:  Current - Temp: 96.6  F (35.9  C); Max - Temp  Av.2  F (36.2  C)  Min: 96.1  F (35.6  C)  Max: 98.2  F (36.8  C)  Pulse range: Pulse  Av.4  Min: 80  Max: 94  Blood pressure range: Systolic (24hrs), Av , Min:96 , Max:139   ; Diastolic (24hrs), Av, Min:53, Max:98    Exam:  CMS: intact  alert, stable, wound ok  Dressing c/d/i  Calf s/nt  DF/PF    Communicates clearly with examiner    Labs:  Recent Labs   Lab Test 19  1115 17  0914 17  0852   POTASSIUM 4.5 4.0 3.8     Recent Labs   Lab Test 03/15/19  0710 19  1115 17  0914   HGB 13.1* 15.6 15.8     No results for input(s): INR in the last 78463 hours.  Recent Labs   Lab Test 19  1115 17  0914 17  0852    294 [id number]       PLAN: continue current therapy regimen. Aspirin for dvt ppx. Discharge for home tomorrow morning.     "

## 2019-03-15 NOTE — PLAN OF CARE
Pt A&O x4. VS stable; having lower bp's so metoprolol and lisinopril held this AM. Afebrile. On remote telemetry. PO oxycodone and atarax managing pain. IV dilaudid given x1 this AM for increased pain. CMS intact. Dressing CDI-ace wrap on. Incision iced. Hemovac drain pulled. Up w/ A1, using gait belt, walker, and KI. Voiding in good amts. Tolerating regular diet. Plan is to discharge to home tomorrow. Will continue to monitor.

## 2019-03-15 NOTE — PLAN OF CARE
Discharge Planner PT   Patient plan for discharge: Home with assist from wife and OP PT   Current status:    pt received supine in bed, agreeable to PT. Performed TKA exercises L with cues for technique, tactile stimulation to promote quad recruitment and assist as needed. Poor quad recruitment & req Max A for SAQ, SLR. KI donned for OOB activity. Supervision supine>sit. CGA Sit<>stand with cues for arm placement. Amb 200' with FWW and CGA, step through gait, cues for upright posture, min circumduction L with KI donned. Pt seated in chair in room upon departure of PT, all needs in reach.  Barriers to return to prior living situation: A x 1, pain  Recommendations for discharge: home with OP PT   Rationale for recommendations: Predict pt will progress to be safe to discharge to home with OP PT to progress funcitonal mobility          Entered by: Maria L Velazquez 03/15/2019 10:14 AM

## 2019-03-15 NOTE — PLAN OF CARE
A&O x4. Up w/Ax1 w/walker and KI. On 2L O2 w/capnography. BS hypoactive, passing flatus. Advanced to regular diet this am. chari DIETRICH removed this am. CMS intact. SL this am. Hemovac in place. Left knee ace wrapped CDI. Pain managed w/ oxycodone, atarax and scheduled Tylenol. Plans for home at discharge.

## 2019-03-15 NOTE — PROGRESS NOTES
03/15/19 1411   Quick Adds   Type of Visit Initial Occupational Therapy Evaluation   Living Environment   Lives With spouse   Living Arrangements house   Home Accessibility stairs to enter home   Number of Stairs, Main Entrance 2   Transportation Anticipated car, drives self;family or friend will provide   [facility] with bilateral rails, tub/shower combination   Self-Care   Usual Activity Tolerance moderate   Current Activity Tolerance moderate   Regular Exercise No   Equipment Currently Used at Home cane, straight;shower chair;walker, rolling  (elevated toilet)   Activity/Exercise/Self-Care Comment Pt IND at baseline, limited by L knee pain. Work labor intensive, drives forklift    Functional Level   Ambulation 0-->independent   Transferring 0-->independent   Toileting 0-->independent   Bathing 0-->independent   Dressing 0-->independent   Eating 0-->independent   Communication 0-->understands/communicates without difficulty   Swallowing 0-->swallows foods/liquids without difficulty   Cognition 0 - no cognition issues reported   Fall history within last six months no   Which of the above functional risks had a recent onset or change? ambulation;toileting;transferring;bathing;dressing   Prior Functional Level Comment Pt has all equipment needs met at home. SEC, FWW. IND prior to surgery   General Information   Onset of Illness/Injury or Date of Surgery - Date 03/14/19   Referring Physician Dr. Lacey   Patient/Family Goals Statement to return home tomorrow   Additional Occupational Profile Info/Pertinent History of Current Problem Patient is POD #1 for L TKA, had previous R TKA 11/17   Precautions/Limitations fall precautions  (KI donned with mobility)   Weight-Bearing Status - LLE weight-bearing as tolerated   General Observations patient was in bed and agreeable to OT session   Cognitive Status Examination   Orientation orientation to person, place and time   Level of  Consciousness alert   Follows Commands (Cognition) WNL   Memory intact   Sensory Examination   Sensory Comments tingling L foot, new, RN was notified and came or assess   Pain Assessment   Patient Currently in Pain Yes, see Vital Sign flowsheet  (rating pain 8/10)   Posture   Posture not impaired   Range of Motion (ROM)   ROM Quick Adds No deficits were identified   Strength   Manual Muscle Testing Quick Adds No deficits were identified   Hand Strength   Hand Strength Comments intact   Muscle Tone Assessment   Muscle Tone Quick Adds No deficits were identified   Coordination   Upper Extremity Coordination No deficits were identified   Mobility   Bed Mobility Comments I with bed mobility- supine to sit    Transfer Skill: Bed to Chair/Chair to Bed   Level of [place]: Bed to Chair (treatment initiated-defer to OT daily note for details)   Transfer Skill: Sit to Stand   Level of [place]: Sit/Stand contact guard   Physical Assist/Nonphysical Assist: Sit/Stand supervision   Transfer Skill: Sit to Stand weight-bearing as tolerated   Assistive Device for Transfer: Sit/Stand rolling walker   Balance   Balance Comments LOB was not noted, KI donned with all mobility   Lower Body Dressing   Level of [place]: Dress Lower Body (treatment initiated-defer to OT daily note for details)   Eating/Self Feeding   Level of [place]: Eating independent   Instrumental Activities of Daily Living (IADL)   IADL Comments spouse to complete as needed, son available to A as well   Activities of Daily Living Analysis   Impairments Contributing to Impaired Activities of Daily Living pain;ROM decreased;strength decreased   General Therapy Interventions   Planned Therapy Interventions ADL retraining;progressive activity/exercise;transfer training   Clinical Impression   Criteria for Skilled Therapeutic Interventions Met yes, treatment indicated   OT Diagnosis decreased ADL's   Influenced by the following impairments pain;ROM  "decreased;strength decreased   Assessment of Occupational Performance 5 or more Performance Deficits   Identified Performance Deficits decreased ADL's and IADL's- dsg, toileting, bathing, community/functional mobility, household and work duties, driving   Clinical Decision Making (Complexity) Low complexity   Therapy Frequency daily   Predicted Duration of Therapy Intervention (days/wks) 2 sesssions   Anticipated Equipment Needs at Discharge bath sponge   Anticipated Discharge Disposition Home   Risks and Benefits of Treatment have been explained. Yes   Patient, Family & other staff in agreement with plan of care Yes   [facility] TM \"6 Clicks\"   2016, Trustees of [employer], under license to [employer].  All rights reserved.   6 Clicks Short Forms Daily Activity Inpatient Short Form   [facility]  \"6 Clicks\" Daily Activity Inpatient Short Form   1. Putting on and taking off regular lower body clothing? 3 - A Little   2. Bathing (including washing, rinsing, drying)? 3 - A Little   3. Toileting, which includes using toilet, bedpan or urinal? 3 - A Little   4. Putting on and taking off regular upper body clothing? 4 - None   5. Taking care of personal grooming such as brushing teeth? 4 - None   6. Eating meals? 4 - None   Daily Activity Raw Score (Score out of 24.Lower scores equate to lower levels of function) 21   Total Evaluation Time   Total Evaluation Time (Minutes) 10     "

## 2019-03-15 NOTE — DISCHARGE SUMMARY
Orthopedic Discharge Summary   Patient: Ab Aguilar  Admission Date: 3/14/2019  Discharge Date: 3/15/19  Date of Service: 3/15/2019  Attending Provider: Zulema Lacey MD  Admission Diagnosis: DJD  H/O total knee replacement, left  Discharge Diagnosis: left knee osteoarthritis  Procedures Performed: left total knee replacement  Complications: None apparent   History of Present Illness: see operative report for full HPI  Past Medical History:   Past Medical History:   Diagnosis Date     Esophageal reflux      Essential hypertension, benign      Major depression      Obesity      Obesity (BMI 35.0-35.9 with comorbidity) (H) 2012     Other and unspecified hyperlipidemia      Other chronic pain     lt knee     Prediabetes 9/26/12    A1C 6.1     Patient Active Problem List   Diagnosis     Essential hypertension, benign     Gastroesophageal reflux disease with esophagitis     Obesity     HYPERLIPIDEMIA LDL GOAL <130     Major depressive disorder with single episode, in full remission (H)     Prediabetes     Obesity (BMI 35.0-39.9) with comorbidity (H)     Obesity (BMI 35.0-35.9 with comorbidity) (H)     H/O total knee replacement, left     Past Surgical History:   Procedure Laterality Date     C NONSPECIFIC PROCEDURE  1991    R ankle fracture, repair     C NONSPECIFIC PROCEDURE  age 6    B inguinal hernia repair     C NONSPECIFIC PROCEDURE  9/01    lumbar laminectomy     ORTHOPEDIC SURGERY      rt knee replacement 2017, rt rotator cuff repair 6 years ago     STRESS ECHO ([facility])  10/02    normal      STRESS ECHO ([facility])  9/05    normal stress echo     Social History     Socioeconomic History     Marital status:      Spouse name: Not on file     Number of children: Not on file     Years of education: Not on file     Highest education level: Not on file   Occupational History     Not on file   Social Needs     Financial resource strain: Not on file     Food insecurity:     Worry: Not on file     Inability:  Not on file     Transportation needs:     Medical: Not on file     Non-medical: Not on file   Tobacco Use     Smoking status: Never Smoker     Smokeless tobacco: Never Used   Substance and Sexual Activity     Alcohol use: Yes     Comment: 10-12 beers/week     Drug use: No     Sexual activity: Yes     Partners: Female   Lifestyle     Physical activity:     Days per week: Not on file     Minutes per session: Not on file     Stress: Not on file   Relationships     Social connections:     Talks on phone: Not on file     Gets together: Not on file     Attends Yazidi service: Not on file     Active member of club or organization: Not on file     Attends meetings of clubs or organizations: Not on file     Relationship status: Not on file     Intimate partner violence:     Fear of current or ex partner: Not on file     Emotionally abused: Not on file     Physically abused: Not on file     Forced sexual activity: Not on file   Other Topics Concern      Service Not Asked     Blood Transfusions Not Asked     Caffeine Concern No     Occupational Exposure Not Asked     Hobby Hazards Not Asked     Sleep Concern Not Asked     Stress Concern Not Asked     Weight Concern Not Asked     Special Diet Not Asked     Back Care Not Asked     Exercise Not Asked     Bike Helmet Not Asked     Seat Belt Not Asked     Self-Exams Not Asked     Parent/sibling w/ CABG, MI or angioplasty before 65F 55M? Not Asked   Social History Narrative     Not on file     Medications on admission:   Medications Prior to Admission   Medication Sig Dispense Refill Last Dose     ibuprofen (ADVIL,MOTRIN) 200 MG tablet Take 800 mg by mouth 3 times daily as needed.   2/28/2019     omeprazole (PRILOSEC) 20 MG CR capsule Take 2 capsules (40 mg) by mouth daily 60 capsule 11 3/14/2019     sucralfate (CARAFATE) 1 GM tablet TAKE 1 TABLET(1 GRAM) BY MOUTH FOUR TIMES DAILY AS NEEDED 360 tablet 1 Past Week at Unknown time     zolpidem (AMBIEN) 10 MG tablet Take  0.5-1 tablets (5-10 mg) by mouth nightly as needed for sleep 30 tablet 1 Past Week at Unknown time     lisinopril (PRINIVIL/ZESTRIL) 40 MG tablet Take 1 tablet (40 mg) by mouth daily 90 tablet 3 3/13/2019     metoprolol succinate ER (TOPROL-XL) 25 MG 24 hr tablet TAKE 1 TABLET BY MOUTH DAILY 90 tablet 1 3/14/2019     pravastatin (PRAVACHOL) 40 MG tablet Take 1 tablet (40 mg) by mouth daily 90 tablet 3 3/13/2019     [DISCONTINUED] aspirin 81 MG EC tablet Take 81 mg by mouth daily   2/28/2019     Allergies:    Allergies   Allergen Reactions     Atorvastatin GI Disturbance     Abdominal and intestinal pains from atorvastatin       Hospital Course: Patient was admitted to Orthopedics and brought to the OR on where he underwent the above named procedure. Postoperatively he did very well with no apparent complications, and he had an uneventful hospital stay. Ancef was given for 24 hours after surgery. He was given aspirin for DVT prophylaxis and his pain was well controlled with oral medications, then transitioned to oral pain medications during his hospital stay. He progressed well with physical therapy and discharge to home was recommended. His chronic medical problems were followed by the medicine team during his hospital stay and there were no significant changes to conditions or treatment plans. No new medical problems presented during his hospital stay.   Consultations Obtained During Hospitalization:  1. Internal medicine for management of comorbid conditions and home medication management.  2. Physical Therapy for gait training, mobilization, range of motion and strengthening exercises  3. Occupational Therapy for activities of daily living.  4. Social Work for disposition planning.  Active Problems:    H/O total knee replacement, left    Discharge Disposition: patient improving  Discharge Diet: resume normal pre op diet  Discharge Medications:   Current Discharge Medication List      START taking these  medications    Details   hydrOXYzine (ATARAX) 25 MG tablet Take 1 tablet (25 mg) by mouth every 6 hours as needed for itching  Qty: 30 tablet, Refills: 0    Associated Diagnoses: H/O total knee replacement, left      ondansetron (ZOFRAN-ODT) 4 MG ODT tab Take 1 tablet (4 mg) by mouth every 6 hours as needed for nausea or vomiting  Qty: 30 tablet, Refills: 0    Associated Diagnoses: H/O total knee replacement, left      oxyCODONE (ROXICODONE) 5 MG tablet Take 1-2 tablets (5-10 mg) by mouth every 3 hours as needed for breakthrough pain  Qty: 30 tablet, Refills: 0    Associated Diagnoses: H/O total knee replacement, left      senna-docusate (SENOKOT-S/PERICOLACE) 8.6-50 MG tablet Take 1 tablet by mouth 2 times daily  Qty: 30 tablet, Refills: 0    Associated Diagnoses: H/O total knee replacement, left         CONTINUE these medications which have CHANGED    Details   aspirin (ASA) 325 MG EC tablet Take 1 tablet (325 mg) by mouth 2 times daily  Qty: 60 tablet, Refills: 0    Associated Diagnoses: H/O total knee replacement, left         CONTINUE these medications which have NOT CHANGED    Details   ibuprofen (ADVIL,MOTRIN) 200 MG tablet Take 800 mg by mouth 3 times daily as needed.      omeprazole (PRILOSEC) 20 MG CR capsule Take 2 capsules (40 mg) by mouth daily  Qty: 60 capsule, Refills: 11    Associated Diagnoses: Gastroesophageal reflux disease without esophagitis      sucralfate (CARAFATE) 1 GM tablet TAKE 1 TABLET(1 GRAM) BY MOUTH FOUR TIMES DAILY AS NEEDED  Qty: 360 tablet, Refills: 1    Comments: PROFILE FOR FUTURE REFILLS UNTIL PATIENT CALLS FOR REFILLS  Associated Diagnoses: Gastroesophageal reflux disease with esophagitis      zolpidem (AMBIEN) 10 MG tablet Take 0.5-1 tablets (5-10 mg) by mouth nightly as needed for sleep  Qty: 30 tablet, Refills: 1    Comments: Not to exceed 4 additional fills before 06/01/2019.  Associated Diagnoses: Primary insomnia      lisinopril (PRINIVIL/ZESTRIL) 40 MG tablet Take 1  tablet (40 mg) by mouth daily  Qty: 90 tablet, Refills: 3    Comments: PROFILE FOR FUTURE REFILLS UNTIL PATIENT CALLS FOR REFILLS  Associated Diagnoses: Essential hypertension, benign      metoprolol succinate ER (TOPROL-XL) 25 MG 24 hr tablet TAKE 1 TABLET BY MOUTH DAILY  Qty: 90 tablet, Refills: 1    Associated Diagnoses: Essential hypertension, benign      pravastatin (PRAVACHOL) 40 MG tablet Take 1 tablet (40 mg) by mouth daily  Qty: 90 tablet, Refills: 3    Comments: PROFILE FOR FUTURE REFILLS UNTIL PATIENT CALLS FOR REFILLS  Associated Diagnoses: Hyperlipidemia LDL goal <130           Code Status:   X-rays needed at the follow up visit:   Khang Tabares PA-C  3/15/2019 9:11 AM   [facility]  [phone]

## 2019-03-16 ENCOUNTER — APPOINTMENT (OUTPATIENT)
Dept: OCCUPATIONAL THERAPY | Facility: CLINIC | Age: 59
DRG: 470 | End: 2019-03-16
Attending: ORTHOPAEDIC SURGERY
Payer: COMMERCIAL

## 2019-03-16 ENCOUNTER — APPOINTMENT (OUTPATIENT)
Dept: PHYSICAL THERAPY | Facility: CLINIC | Age: 59
DRG: 470 | End: 2019-03-16
Attending: ORTHOPAEDIC SURGERY
Payer: COMMERCIAL

## 2019-03-16 VITALS
WEIGHT: 260 LBS | OXYGEN SATURATION: 95 % | SYSTOLIC BLOOD PRESSURE: 133 MMHG | RESPIRATION RATE: 16 BRPM | BODY MASS INDEX: 37.22 KG/M2 | HEART RATE: 102 BPM | TEMPERATURE: 97.1 F | HEIGHT: 70 IN | DIASTOLIC BLOOD PRESSURE: 87 MMHG

## 2019-03-16 LAB
GLUCOSE BLDC GLUCOMTR-MCNC: 118 MG/DL (ref 70–99)
GLUCOSE BLDC GLUCOMTR-MCNC: 125 MG/DL (ref 70–99)
GLUCOSE BLDC GLUCOMTR-MCNC: 130 MG/DL (ref 70–99)
GLUCOSE SERPL-MCNC: 114 MG/DL (ref 70–99)
HGB BLD-MCNC: 13.3 G/DL (ref 13.3–17.7)

## 2019-03-16 PROCEDURE — 85018 HEMOGLOBIN: CPT | Performed by: ORTHOPAEDIC SURGERY

## 2019-03-16 PROCEDURE — 97110 THERAPEUTIC EXERCISES: CPT | Mod: GP

## 2019-03-16 PROCEDURE — 25000132 ZZH RX MED GY IP 250 OP 250 PS 637: Performed by: INTERNAL MEDICINE

## 2019-03-16 PROCEDURE — 40000193 ZZH STATISTIC PT WARD VISIT

## 2019-03-16 PROCEDURE — 97116 GAIT TRAINING THERAPY: CPT | Mod: GP

## 2019-03-16 PROCEDURE — 25000132 ZZH RX MED GY IP 250 OP 250 PS 637: Performed by: ORTHOPAEDIC SURGERY

## 2019-03-16 PROCEDURE — 97535 SELF CARE MNGMENT TRAINING: CPT | Mod: GO

## 2019-03-16 PROCEDURE — 00000146 ZZHCL STATISTIC GLUCOSE BY METER IP

## 2019-03-16 PROCEDURE — 36415 COLL VENOUS BLD VENIPUNCTURE: CPT | Performed by: ORTHOPAEDIC SURGERY

## 2019-03-16 PROCEDURE — 82947 ASSAY GLUCOSE BLOOD QUANT: CPT | Performed by: ORTHOPAEDIC SURGERY

## 2019-03-16 RX ADMIN — METOPROLOL SUCCINATE 25 MG: 25 TABLET, EXTENDED RELEASE ORAL at 08:27

## 2019-03-16 RX ADMIN — OXYCODONE HYDROCHLORIDE 10 MG: 5 TABLET ORAL at 08:26

## 2019-03-16 RX ADMIN — SENNOSIDES AND DOCUSATE SODIUM 2 TABLET: 8.6; 5 TABLET ORAL at 08:27

## 2019-03-16 RX ADMIN — OXYCODONE HYDROCHLORIDE 10 MG: 5 TABLET ORAL at 05:18

## 2019-03-16 RX ADMIN — OXYCODONE HYDROCHLORIDE 10 MG: 5 TABLET ORAL at 11:54

## 2019-03-16 RX ADMIN — ACETAMINOPHEN 975 MG: 325 TABLET, FILM COATED ORAL at 05:18

## 2019-03-16 RX ADMIN — OMEPRAZOLE 40 MG: 20 CAPSULE, DELAYED RELEASE ORAL at 08:27

## 2019-03-16 RX ADMIN — ASPIRIN 325 MG: 325 TABLET, DELAYED RELEASE ORAL at 08:27

## 2019-03-16 RX ADMIN — HYDROXYZINE HYDROCHLORIDE 25 MG: 25 TABLET ORAL at 05:18

## 2019-03-16 RX ADMIN — LISINOPRIL 40 MG: 40 TABLET ORAL at 08:27

## 2019-03-16 RX ADMIN — HYDROXYZINE HYDROCHLORIDE 25 MG: 25 TABLET ORAL at 13:51

## 2019-03-16 RX ADMIN — PRAVASTATIN SODIUM 40 MG: 20 TABLET ORAL at 08:27

## 2019-03-16 RX ADMIN — OXYCODONE HYDROCHLORIDE 10 MG: 5 TABLET ORAL at 02:19

## 2019-03-16 RX ADMIN — ACETAMINOPHEN 975 MG: 325 TABLET, FILM COATED ORAL at 13:51

## 2019-03-16 ASSESSMENT — ACTIVITIES OF DAILY LIVING (ADL)
ADLS_ACUITY_SCORE: 11

## 2019-03-16 NOTE — PLAN OF CARE
Pt A&O x4. VS stable; afebrile. PO oxycodone, vistaril, and scheduled tylenol managing pain. CMS intact. Dressing CDI-incision iced. Up w/ A1, using gait belt, and walker. Voiding in good amts. Tolerating regular diet.     Reviewed discharge instructions and medications with patient. Questions answered. Patient discharged to home via son with, discharge instructions, filled medications (Oxycodone, senna, aspirin, vistaril, and zofran), and belongings at this time.

## 2019-03-16 NOTE — DISCHARGE INSTRUCTIONS
Return to clinic in 10-14 days.  Call [phone] to schedule or if you experience any problems before your scheduled appointment.      See general discharge instruction sheet for knees  1. Do exercises at home as instructed by therapist twice a day. Continue outpatient therapy as ordered by your doctor.  2. Ice knee after exercises and therapy.  3. Examine dressing daily for problems.  4. May shower, can get dressing wet, no soaking (no bathtubs, pools or hot tubs)  5. Notify your dentist or physician of your implant so you can get antibiotics before any dental work or before any invasive procedure (ie: colonoscopy)  6. Remove anti-embolism stockings (Justin hose) for 30 minutes twice daily.        While on narcotic pain medication, to prevent constipation:  1. Drink plenty of water to keep well hydrated   2. May take over the counter Colace or Senna (follow instructions on label)        Call your physician if: ([phone])   1. Persistent fever greater than 100 degrees with body chills or excessive sweating.  2. Increased/persistent redness, localized warmth, tenderness, drainage or swelling at incision site. Greater than 50% drainage on dressing.   3. Persistent pain not controlled with oral pain medications, ice and rest.   4. No bowel movement in 3 days (may use Milk of Magnesia or other over the counter remedy).  5. Chest pain, shortness of breath, and/or calf pain with excessive swelling.  6. Generalized feeling of illness.  7. Any other questions or concerns related to your surgery/recovery.    Thank you for allowing [facility] to participate in your cares!!

## 2019-03-16 NOTE — PLAN OF CARE
Discharge Planner PT   Patient plan for discharge: home with adult son  Current status: pt ambulated 80 feet with a FWW and KI SBA. Pt ascended and descended 4 steps with 1 HR (SBA). Pt required cuing for sequencing   Barriers to return to prior living situation: none anticipated   Recommendations for discharge: home with adult son and OP PT  Rationale for recommendations: pt would benefit from continued skilled PT in order to return to PLOF       Entered by: Tom Kim 03/16/2019 8:53 AM      PT PM session:    Discharge Planner PT   Patient plan for discharge: home with adult son  Current status: pt ambulated 100 feet with a FWW. Pt did not require a KI. LE sequencing for stairs was reviewed   Barriers to return to prior living situation: none anticipated   Recommendations for discharge: home with OP PT  Rationale for recommendations: pt has met his IP discharge goals and would benefit from OP PT       Entered by: Tom Kim 03/16/2019 1:19 PM    Physical Therapy Discharge Summary    Reason for therapy discharge:    All goals and outcomes met, no further needs identified.    Progress towards therapy goal(s). See goals on Care Plan in [facility] electronic health record for goal details.  Goals met    Therapy recommendation(s):    continue with OP PT

## 2019-03-16 NOTE — PLAN OF CARE
"[facility] Orthopedic Nursing Progress Note         Assessment     Assessment:    /61 (BP Location: Right arm)   Pulse 76   Temp 97.9  F (36.6  C) (Oral)   Resp 18   Ht 1.778 m (5' 10\")   Wt 117.9 kg (260 lb)   SpO2 98%   BMI 37.31 kg/m    Lungs: are clear, encouraged to CDB and use IS hourly  BS: positive, passing flatus, tolerating a regular diet  Urine: Voiding adequate amounts of clear yellow urine   CMS: intact, Dressing is CDI.  Pain: controlled with oral pain meds before therapies and as needed. Ice to operative extremity.   Activity: Up with assist of 1 and walker, tolerated 30 minutes of sitting in wheelchair after therapies.    "

## 2019-03-16 NOTE — PLAN OF CARE
Discharge Planner OT   Patient plan for discharge: Return home  Current status: Pt ambulated to satelite with SBA and FWW. Pt educated on compensatory tub/shower transfer strategies for increased safety and ind with I/ADL's. Pt demo'd ability to complete shower transfer with SBA, tub bench, and grab bars. Pt reported no concerns prior to discharge. Pt reported son available to assist PRN with I/ADL's. Session ended with pt supine in bed. Bed alarm on; all needs in reach.   Barriers to return to prior living situation: Increased pain; decreased ind with I/ADL's  Recommendations for discharge: Home with assist as needed for home management tasks  Rationale for recommendations: Pt progressing well. Pt reported having all necessary AE for safe and ind completion of self-care tasks. Pt reported son available to assist PRN with IADL's. All IP OT goals met.        Entered by: Anny Fan 03/16/2019 10:23 AM       Occupational Therapy Discharge Summary    Reason for therapy discharge:    All goals and outcomes met, no further needs identified.    Progress towards therapy goal(s). See goals on Care Plan in [facility] electronic health record for goal details.  Goals met    Therapy recommendation(s):    Recommended assistance as needed for home management tasks.

## 2019-03-16 NOTE — PLAN OF CARE
Assumed care from 1900 to 0730  A&Ox4, up Ax1 w/GB, walker, & KI  LDA: PIV SL  Vitals: Tmax 100.4, sched tylenol  Pain: requiring oxy q3h, atarax q6h   & 130, regular diet  Tele: ST per tele tech  Skin: Dressing CDI, CMS intact  GI/: Voiding via urinal  Followed by Ortho, PT, OT  Plan: DC Home today  Will continue to monitor

## 2019-03-16 NOTE — PROGRESS NOTES
[facility]    Hospitalist Progress Note  Provider : Waqas Ledesma MD  Date of Service (when I saw the patient): 03/15/2019    Assessment & Plan   Mr. Berger is a 58-year-old male with past medical history significant for hypertension, hyperlipidemia, GERD, osteoarthritis, chronic pain admitted 3/14/2019 for elective left total knee arthroplasty for DJD.       Post op patient had an episode of Chest pain.     1. Chest Pain, likely noncardiac  -- resolved. Troponin negative. No EKG changes     2. Status post arthroplasty  --Patient underwent procedure for limiting DJD   --Pain management, PT. anticoagulation per primary team     3. Hypertension  --Prior to admission patient on lisinopril and metoprolol  --continue meds.   --BP stable     4. Hyperlipidemia  --Resume pravastatin     5. GERD  --Continue PPI     6. Pre-diabetes  --A1c was 6.1 not on any medications  --We will monitor glucose     Disposition: Expected discharge per primary team    Waqas Ledesma MD    Interval History   Patient seen earlier today. He is  feeling ok. Sleepy. No new complaints    -Data reviewed today: I reviewed all new labs and imaging results over the last 24 hours. I personally reviewed no images or EKG's today.    Physical Exam   Temp: 100.4  F (38  C)(encourqaged IS. Patient did 10 ) Temp src: Oral BP: 127/80 Pulse: 101 Heart Rate: 92 Resp: 16 SpO2: 98 % O2 Device: None (Room air) Oxygen Delivery: 1 LPM  Vitals:    02/26/19 1321 03/12/19 0900 03/14/19 0603   Weight: 113.4 kg (250 lb) 119.7 kg (264 lb) 117.9 kg (260 lb)     Vital Signs with Ranges  Temp:  [96.1  F (35.6  C)-100.4  F (38  C)] 100.4  F (38  C)  Pulse:  [] 101  Heart Rate:  [75-92] 92  Resp:  [10-18] 16  BP: (101-127)/(53-80) 127/80  SpO2:  [96 %-98 %] 98 %  I/O last 3 completed shifts:  In: 2292 [P.O.:940; I.V.:1352]  Out: 5990 [Urine:5450; Drains:540]    GEN:  Alert, oriented x 3, appears comfortable, NAD.  HEENT:   Normocephalic/atraumatic, no scleral icterus, no nasal discharge, mouth moist.  CV:  Regular rate and rhythm, no murmur or JVD.  S1 + S2 noted, no S3 or S4.  LUNGS:  Clear to auscultation bilaterally without rales/rhonchi/wheezing/retractions.  Symmetric chest rise on inhalation noted.  ABD:  Active bowel sounds, soft, non-tender/non-distended.  No rebound/guarding/rigidity.  EXT:  No edema or cyanosis.  Hands/feet warm to touch with good signs of peripheral perfusion.  No joint synovitis noted.  SKIN:  Dry to touch, no exanthems noted in the visualized areas.  NEURO:  Symmetric muscle strength, sensation to touch grossly intact.  No new focal deficits appreciated.    Medications     lactated ringers 75 mL/hr at 03/14/19 2245       acetaminophen  975 mg Oral Q8H     aspirin  325 mg Oral BID     insulin aspart  1-7 Units Subcutaneous TID AC     insulin aspart  1-5 Units Subcutaneous At Bedtime     lisinopril  40 mg Oral Daily     metoprolol succinate ER  25 mg Oral Daily     omeprazole  40 mg Oral Daily     pravastatin  40 mg Oral Daily     senna-docusate  1 tablet Oral BID    Or     senna-docusate  2 tablet Oral BID     sodium chloride (PF)  3 mL Intracatheter Q8H       Data   Recent Labs   Lab 03/15/19  0710 03/15/19  0149 03/14/19  2230 03/14/19  1753   HGB 13.1*  --   --   --    * 124*  --   --    TROPI  --  <0.015 <0.015 <0.015       No results found for this or any previous visit (from the past 24 hour(s)).

## 2019-03-18 ENCOUNTER — TELEPHONE (OUTPATIENT)
Dept: INTERNAL MEDICINE | Facility: CLINIC | Age: 59
End: 2019-03-18

## 2019-03-18 LAB — INTERPRETATION ECG - MUSE: NORMAL

## 2019-03-28 ENCOUNTER — TRANSFERRED RECORDS (OUTPATIENT)
Dept: HEALTH INFORMATION MANAGEMENT | Facility: CLINIC | Age: 59
End: 2019-03-28

## 2019-04-20 DIAGNOSIS — F51.01 PRIMARY INSOMNIA: ICD-10-CM

## 2019-04-21 NOTE — TELEPHONE ENCOUNTER
Requested Prescriptions   Pending Prescriptions Disp Refills     zolpidem (AMBIEN) 10 MG tablet [Pharmacy Med Name: ZOLPIDEM TARTRATE 10 MG TABLET] 30 tablet 1     Sig: TAKE 0.5-1 TAB BY MOUTH NIGHTLY AS NEEDED FOR SLEEP       There is no refill protocol information for this order              Last Written Prescription Date:  2/20/2019  Last Fill Quantity: 30,   # refills: 1  Last Office Visit: 3/5/2019  Future Office visit:       Routing refill request to provider for review/approval because:  Drug not on the [facility], P or [facility] refill protocol or controlled substance

## 2019-04-23 RX ORDER — ZOLPIDEM TARTRATE 10 MG/1
TABLET ORAL
Qty: 30 TABLET | Refills: 1 | Status: SHIPPED | OUTPATIENT
Start: 2019-04-23 | End: 2019-06-14

## 2019-04-29 ENCOUNTER — TRANSFERRED RECORDS (OUTPATIENT)
Dept: HEALTH INFORMATION MANAGEMENT | Facility: CLINIC | Age: 59
End: 2019-04-29

## 2019-06-14 DIAGNOSIS — F51.01 PRIMARY INSOMNIA: ICD-10-CM

## 2019-06-17 ENCOUNTER — TELEPHONE (OUTPATIENT)
Dept: INTERNAL MEDICINE | Facility: CLINIC | Age: 59
End: 2019-06-17

## 2019-06-17 DIAGNOSIS — Z12.11 SCREEN FOR COLON CANCER: Primary | ICD-10-CM

## 2019-06-17 DIAGNOSIS — F51.01 PRIMARY INSOMNIA: ICD-10-CM

## 2019-06-17 RX ORDER — ZOLPIDEM TARTRATE 10 MG/1
TABLET ORAL
Qty: 30 TABLET | Refills: 3 | Status: SHIPPED | OUTPATIENT
Start: 2019-06-17 | End: 2019-09-30

## 2019-06-17 RX ORDER — ZOLPIDEM TARTRATE 10 MG/1
TABLET ORAL
Qty: 30 TABLET | Refills: 1 | OUTPATIENT
Start: 2019-06-17

## 2019-06-17 NOTE — LETTER
[facility]  [address] [address]  [phone]  June 17, 2019    Ab Aguilar  [address]  [facility] [address]    Dear Ab,    We care about your health and based on a review of your medical records, recommend the the following, to better manage your health:      You are in particular need of attention regarding:  -Depression/Anxiety  -High Blood Pressure  -Colon Cancer Screening  -Wellness (Physical) Visit     I am recommending that you:     -schedule a FOLLOWUP OFFICE APPOINTMENT with me.       -schedule a WELLNESS (Physical) APPOINTMENT with me.   I will check fasting labs the same day - nothing to eat except water and meds for 8-10 hours prior.    -schedule a COLONOSCOPY to look for colon cancer (due every 10 years or 5 years in higher risk situations.)        Colon cancer is now the second leading cause of cancer-related deaths in the [place] for both men and women and there are over 130,000 new cases and 50,000 deaths per year from colon cancer.  Colonoscopies can prevent 90-95% of these deaths.  Problem lesions can be removed before they ever become cancer.  This test is not only looking for cancer, but also getting rid of precancerious lesions.    If you are under/uninsured, we recommend you contact the [employer] program. [employer] is a free colorectal cancer screening program that provides colonoscopies for eligible under/uninsured [place] men and women. If you are interested in receiving a free colonoscopy, please call [employer] at 1-338.537.7628 (mention code [employer]) to see if you re eligible.      If you do not wish to do a colonoscopy or cannot afford to do one, at this time, there is another option. It is called a FIT test or Fecal Immunochemical Occult Blood Test (take home stool sample kit).  It does not replace the colonoscopy for colorectal cancer screening, but it can detect hidden  bleeding in the lower colon.  It does need to be repeated every year and if a positive result is obtained, you would be referred for a colonoscopy.          If you have completed either one of these tests at another facility, please call with the details of when and where the tests were done and if they were normal or not. Or have the records sent to our clinic so that we can best coordinate your care.    Please complete the enclosed PHQ9 and mail back to clinic in the envelope provided.         Here is a list of Health Maintenance topics that are due now or due soon:  Health Maintenance Due   Topic Date Due     Colonscopy  07/06/1972     Zoster (Shingles) Vaccine (1 of 2) 07/06/2012     Liver Function Panel  12/20/2012     Preventive Care Visit  02/15/2018     Depression Assessment  05/08/2018       Please call us at [phone] or [phone] (or use [employer] to address the above recommendations.     Thank you for trusting [facility].  We appreciate the opportunity to serve you and look forward to supporting your healthcare needs in the future.    If you have (or plan to have) any of these tests done at a facility other than a [facility] or a [facility], please have the results from these tests sent to your primary physician at [facility].    Healthy Regards,    Shayne Suggs MD

## 2019-06-17 NOTE — TELEPHONE ENCOUNTER
Zolpidem      Last Written Prescription Date:  4/23/2019  Last Fill Quantity: 30,   # refills: 1  Last Office Visit: 3/5/2019  Future Office visit:       Routing refill request to provider for review/approval because:  Drug not on the [facility], [facility] or  Health refill protocol or controlled substance    RX [place] reviewed:  reviewed- no concerns    [place]:  [email or web address]    Last Filled:  5/20/2019, #30- request considered a few days early?  4/23/2019, #30  3/24/2019, #30  2/24/2019, #30    Donna ACOSTA RN, BSN, PHN

## 2019-06-17 NOTE — TELEPHONE ENCOUNTER
Panel Management Review      Patient has the following on his problem list:     Hypertension   Last three blood pressure readings:  BP Readings from Last 3 Encounters:   03/16/19 133/87   03/05/19 140/90   03/12/18 (!) 148/98     Blood pressure: FAILED    HTN Guidelines:  Less than 140/90      Composite cancer screening  Chart review shows that this patient is due/due soon for the following Colonoscopy  Summary:    Patient is due/failing the following:   COLONOSCOPY, FOLLOW UP, PHQ9 and PHYSICAL    Action needed:   Patient needs office visit for physical and BP check., Patient needs to do PHQ9. and Patient needs referral/order: colonoscopy     Type of outreach:    Sent [employer] message. and Sent letter.    Questions for provider review:    None                                                                                                                                    Vashti Huizar       Chart routed to Provider .

## 2019-06-18 RX ORDER — ZOLPIDEM TARTRATE 10 MG/1
TABLET ORAL
Qty: 30 TABLET | Refills: 1 | OUTPATIENT
Start: 2019-06-18

## 2019-07-22 ENCOUNTER — TRANSFERRED RECORDS (OUTPATIENT)
Dept: HEALTH INFORMATION MANAGEMENT | Facility: CLINIC | Age: 59
End: 2019-07-22

## 2019-07-22 NOTE — TELEPHONE ENCOUNTER
ED / Discharge Outreach Protocol    Patient Contact    Attempt # 1    Was call answered?  No.  Left message on voicemail with information to call me back.  
ED / Discharge Outreach Protocol    Patient Contact    Attempt # 2    Was call answered?  No.  Left message on voicemail with information to call me back.    
ED / Discharge Outreach Protocol    Patient Contact    Attempt # 3    Was call answered?  No.  Left message on voicemail with information to call me back.    
Please call pt for hospital f/u.  
no fever and no chills.

## 2019-08-27 ENCOUNTER — DOCUMENTATION ONLY (OUTPATIENT)
Dept: INTERNAL MEDICINE | Facility: CLINIC | Age: 59
End: 2019-08-27

## 2019-08-27 DIAGNOSIS — R73.03 PREDIABETES: Primary | ICD-10-CM

## 2019-08-27 DIAGNOSIS — I10 ESSENTIAL HYPERTENSION, BENIGN: ICD-10-CM

## 2019-08-27 DIAGNOSIS — E78.5 HYPERLIPIDEMIA LDL GOAL <130: ICD-10-CM

## 2019-08-27 NOTE — PROGRESS NOTES
Future orders placed for lab draw 8/30.    Someone at the hospital placed orders for glucose monitoring 4 times per day that are continuing to release.   This was not D/C'd at discharge.     How do we stop this?  How do we erase these orders so as to not clog the chart?    Please contact Epic staff if needed.

## 2019-08-30 DIAGNOSIS — R73.03 PREDIABETES: ICD-10-CM

## 2019-08-30 DIAGNOSIS — I10 ESSENTIAL HYPERTENSION, BENIGN: ICD-10-CM

## 2019-08-30 LAB
ANION GAP SERPL CALCULATED.3IONS-SCNC: 7 MMOL/L (ref 3–14)
BUN SERPL-MCNC: 14 MG/DL (ref 7–30)
CALCIUM SERPL-MCNC: 8.7 MG/DL (ref 8.5–10.1)
CHLORIDE SERPL-SCNC: 108 MMOL/L (ref 94–109)
CO2 SERPL-SCNC: 27 MMOL/L (ref 20–32)
CREAT SERPL-MCNC: 0.77 MG/DL (ref 0.66–1.25)
ERYTHROCYTE [DISTWIDTH] IN BLOOD BY AUTOMATED COUNT: 15.9 % (ref 10–15)
GFR SERPL CREATININE-BSD FRML MDRD: >90 ML/MIN/{1.73_M2}
GLUCOSE SERPL-MCNC: 103 MG/DL (ref 70–99)
HBA1C MFR BLD: 6 % (ref 0–5.6)
HCT VFR BLD AUTO: 44 % (ref 40–53)
HGB BLD-MCNC: 14.5 G/DL (ref 13.3–17.7)
MCH RBC QN AUTO: 28.4 PG (ref 26.5–33)
MCHC RBC AUTO-ENTMCNC: 33 G/DL (ref 31.5–36.5)
MCV RBC AUTO: 86 FL (ref 78–100)
PLATELET # BLD AUTO: 242 10E9/L (ref 150–450)
POTASSIUM SERPL-SCNC: 4.3 MMOL/L (ref 3.4–5.3)
RBC # BLD AUTO: 5.11 10E12/L (ref 4.4–5.9)
SODIUM SERPL-SCNC: 142 MMOL/L (ref 133–144)
WBC # BLD AUTO: 6.5 10E9/L (ref 4–11)

## 2019-08-30 PROCEDURE — 85027 COMPLETE CBC AUTOMATED: CPT | Performed by: INTERNAL MEDICINE

## 2019-08-30 PROCEDURE — 36415 COLL VENOUS BLD VENIPUNCTURE: CPT | Performed by: INTERNAL MEDICINE

## 2019-08-30 PROCEDURE — 83036 HEMOGLOBIN GLYCOSYLATED A1C: CPT | Performed by: INTERNAL MEDICINE

## 2019-08-30 PROCEDURE — 80048 BASIC METABOLIC PNL TOTAL CA: CPT | Performed by: INTERNAL MEDICINE

## 2019-09-04 ENCOUNTER — OFFICE VISIT (OUTPATIENT)
Dept: INTERNAL MEDICINE | Facility: CLINIC | Age: 59
End: 2019-09-04
Payer: COMMERCIAL

## 2019-09-04 VITALS
HEART RATE: 87 BPM | BODY MASS INDEX: 39.51 KG/M2 | DIASTOLIC BLOOD PRESSURE: 89 MMHG | OXYGEN SATURATION: 98 % | SYSTOLIC BLOOD PRESSURE: 138 MMHG | WEIGHT: 276 LBS | TEMPERATURE: 98.8 F | HEIGHT: 70 IN

## 2019-09-04 DIAGNOSIS — R73.03 PREDIABETES: ICD-10-CM

## 2019-09-04 DIAGNOSIS — Z96.652 H/O TOTAL KNEE REPLACEMENT, LEFT: ICD-10-CM

## 2019-09-04 DIAGNOSIS — I10 ESSENTIAL HYPERTENSION, BENIGN: ICD-10-CM

## 2019-09-04 DIAGNOSIS — E66.01 SEVERE OBESITY (BMI 35.0-35.9 WITH COMORBIDITY) (H): ICD-10-CM

## 2019-09-04 DIAGNOSIS — Z00.00 ROUTINE GENERAL MEDICAL EXAMINATION AT A HEALTH CARE FACILITY: Primary | ICD-10-CM

## 2019-09-04 DIAGNOSIS — K21.9 GASTROESOPHAGEAL REFLUX DISEASE WITHOUT ESOPHAGITIS: ICD-10-CM

## 2019-09-04 PROCEDURE — 99396 PREV VISIT EST AGE 40-64: CPT | Performed by: INTERNAL MEDICINE

## 2019-09-04 RX ORDER — METOPROLOL SUCCINATE 25 MG/1
25 TABLET, EXTENDED RELEASE ORAL DAILY
Qty: 90 TABLET | Refills: 1 | Status: SHIPPED | OUTPATIENT
Start: 2019-09-04 | End: 2020-05-22

## 2019-09-04 ASSESSMENT — MIFFLIN-ST. JEOR: SCORE: 2083.18

## 2019-09-04 NOTE — LETTER
[facility]  [address] [address]  [phone]        January 14, 2020    Ab Aguilar  [id number]  [facility] [address]        Dear Ab Aguilar    This is to remind you that your non-fasting labs is due.    You may call our office at [phone] to schedule an appointment.    Please disregard this notice if you have already had your labs drawn or made an appointment.        Sincerely,        Shayne Suggs MD

## 2019-09-04 NOTE — PROGRESS NOTES
SUBJECTIVE:   CC: Ab Aguilar is an 59 year old male who presents for preventative health visit.     Healthy Habits:     Getting at least 3 servings of Calcium per day:  Yes    Bi-annual eye exam:  NO    Dental care twice a year:  NO    Sleep apnea or symptoms of sleep apnea:  Daytime drowsiness    Diet:  Regular (no restrictions)    Frequency of exercise:  None    Taking medications regularly:  Yes    Medication side effects:  None    PHQ-2 Total Score: 0    Additional concerns today:  No          1.    Blood presure remains well controlled at home  Readings outside clinic are within normal limits.  Reviewed last 6 BP readings in chart:  BP Readings from Last 6 Encounters:   09/04/19 138/89   03/16/19 133/87   03/05/19 140/90   03/12/18 (!) 148/98   01/16/18 123/83   11/08/17 150/82     He has not experienced any significant side effects from medicaiotns for hypertension.    NO active cardiac complaints or symptoms with exercise.     2.  The patient has a history of impaired glucose tolerance with regularly elevated blood sugars.    They have not been diagnosed with type II DM or placed on medications for diabetes before.   They deny polyuria, polydipsia.     The patient is obese with a BMI of Body mass index is 39.6 kg/m ..    Review of current labs show:    Lab Results   Component Value Date    A1C 6.0 08/30/2019    A1C 6.0 03/05/2019    A1C 5.9 10/30/2014    A1C 5.7 09/23/2013    A1C 6.1 09/26/2012    A1C 6.2 09/28/2009    A1C 5.9 04/08/2008    A1C 5.3 09/27/2007       3.  Recovering from the total knee arthroplasty this March, still has some    4.    The patient has had a history of ongoing obesity.  Reviewed the weigth curves.   Their current BMI is:  Body mass index is 39.6 kg/m .  Reviewed previous attempts at weight loss which have not been successful in producing prolonged weigth loss.   Discussed current eating and exercise habits.     Reviewed weights in chart:  Wt Readings from Last 10 Encounters:    09/04/19 125.2 kg (276 lb)   03/14/19 117.9 kg (260 lb)   03/05/19 119.7 kg (264 lb)   03/12/18 121.2 kg (267 lb 3.2 oz)   11/08/17 120.2 kg (264 lb 14.4 oz)   06/28/17 116.1 kg (255 lb 14.4 oz)   05/09/17 119.6 kg (263 lb 9.6 oz)   02/27/17 116.1 kg (255 lb 14.4 oz)   02/15/17 116.5 kg (256 lb 14.4 oz)   02/02/16 123.8 kg (273 lb)            Today's PHQ-2 Score:   PHQ-2 ( 1999 Pfizer) 9/4/2019   Q1: Little interest or pleasure in doing things 0   Q2: Feeling down, depressed or hopeless 0   PHQ-2 Score 0   Q1: Little interest or pleasure in doing things Not at all   Q2: Feeling down, depressed or hopeless Not at all   PHQ-2 Score 0       Abuse: Current or Past(Physical, Sexual or Emotional)- No  Do you feel safe in your environment? Yes    Social History     Tobacco Use     Smoking status: Never Smoker     Smokeless tobacco: Never Used   Substance Use Topics     Alcohol use: Yes     Comment: 10-12 beers/week         Alcohol Use 9/4/2019   Prescreen: >3 drinks/day or >7 drinks/week? Yes   Prescreen: >3 drinks/day or >7 drinks/week? -   AUDIT SCORE  5       Last PSA:   PSA   Date Value Ref Range Status   03/05/2019 0.55 0 - 4 ug/L Final     Comment:     Assay Method:  Chemiluminescence using Siemens Vista analyzer       Reviewed orders with patient. Reviewed health maintenance and updated orders accordingly - Yes      Reviewed and updated as needed this visit by clinical staff  Tobacco  Allergies  Meds  Problems  Med Hx  Surg Hx  Fam Hx  Soc Hx          Reviewed and updated as needed this visit by Provider  Tobacco  Allergies  Meds  Problems  Med Hx  Surg Hx  Fam Hx          Past Medical History:  ---------------------------  Past Medical History:   Diagnosis Date     Esophageal reflux      Essential hypertension, benign      Major depression      Obesity      Obesity (BMI 35.0-35.9 with comorbidity) (H) 2012     Other and unspecified hyperlipidemia      Other chronic pain     lt knee     Prediabetes  9/26/12    A1C 6.1       Past Surgical History:  ---------------------------  Past Surgical History:   Procedure Laterality Date     ARTHROPLASTY KNEE Left 3/14/2019    Procedure: Left total knee arthroplasty;  Surgeon: Zulema Lacey MD;  Location: [facility] OR     C NONSPECIFIC PROCEDURE  1991    R ankle fracture, repair     C NONSPECIFIC PROCEDURE  age 6    B inguinal hernia repair     C NONSPECIFIC PROCEDURE  9/01    lumbar laminectomy     ORTHOPEDIC SURGERY      rt knee replacement 2017, rt rotator cuff repair 6 years ago     STRESS ECHO ([facility])  10/02    normal      STRESS ECHO ([facility])  9/05    normal stress echo       Current Medications:  ---------------------------  Current Outpatient Medications   Medication Sig Dispense Refill     aspirin (ASA) 81 MG EC tablet Take 1 tablet (81 mg) by mouth daily       ibuprofen (ADVIL,MOTRIN) 200 MG tablet Take 800 mg by mouth 3 times daily as needed.       lisinopril (PRINIVIL/ZESTRIL) 40 MG tablet Take 1 tablet (40 mg) by mouth daily 90 tablet 3     metoprolol succinate ER (TOPROL-XL) 25 MG 24 hr tablet Take 1 tablet (25 mg) by mouth daily 90 tablet 1     omeprazole (PRILOSEC) 20 MG DR capsule Take 2 capsules (40 mg) by mouth daily       pravastatin (PRAVACHOL) 40 MG tablet Take 1 tablet (40 mg) by mouth daily 90 tablet 3     sucralfate (CARAFATE) 1 GM tablet TAKE 1 TABLET(1 GRAM) BY MOUTH FOUR TIMES DAILY AS NEEDED 360 tablet 1     zolpidem (AMBIEN) 10 MG tablet TAKE HALF TO ONE TABLET BY MOUTH NIGHTLY AS NEEDED FOR SLEEP 30 tablet 3       Allergies:  -------------  Allergies   Allergen Reactions     Atorvastatin GI Disturbance     Abdominal and intestinal pains from atorvastatin       Social History:  -------------------  Social History     Socioeconomic History     Marital status:      Spouse name: Not on file     Number of children: Not on file     Years of education: Not on file     Highest education level: Not on file   Occupational History     Not on file    Social Needs     Financial resource strain: Not on file     Food insecurity:     Worry: Not on file     Inability: Not on file     Transportation needs:     Medical: Not on file     Non-medical: Not on file   Tobacco Use     Smoking status: Never Smoker     Smokeless tobacco: Never Used   Substance and Sexual Activity     Alcohol use: Yes     Comment: 10-12 beers/week     Drug use: No     Sexual activity: Yes     Partners: Female   Lifestyle     Physical activity:     Days per week: Not on file     Minutes per session: Not on file     Stress: Not on file   Relationships     Social connections:     Talks on phone: Not on file     Gets together: Not on file     Attends Tenriism service: Not on file     Active member of club or organization: Not on file     Attends meetings of clubs or organizations: Not on file     Relationship status: Not on file     Intimate partner violence:     Fear of current or ex partner: Not on file     Emotionally abused: Not on file     Physically abused: Not on file     Forced sexual activity: Not on file   Other Topics Concern      Service Not Asked     Blood Transfusions Not Asked     Caffeine Concern No     Occupational Exposure Not Asked     Hobby Hazards Not Asked     Sleep Concern Not Asked     Stress Concern Not Asked     Weight Concern Not Asked     Special Diet Not Asked     Back Care Not Asked     Exercise Not Asked     Bike Helmet Not Asked     Seat Belt Not Asked     Self-Exams Not Asked     Parent/sibling w/ CABG, MI or angioplasty before 65F 55M? Not Asked   Social History Narrative     Not on file       Family Medical History:  ------------------------------  Family History   Problem Relation Age of Onset     Hypertension Father      Heart Disease Brother 37        b. 1967, 1st MI at age 37     Family History Negative Mother      Family History Negative Brother         b. 1965     Family History Negative Sister         b. 1961        Review of  "Systems  CONSTITUTIONAL: NEGATIVE for fever, chills, change in weight  INTEGUMENTARY/SKIN: NEGATIVE for worrisome rashes, moles or lesions  EYES: NEGATIVE for vision changes or irritation  ENT: NEGATIVE for ear, mouth and throat problems  RESP: NEGATIVE for significant cough or SOB  CV: NEGATIVE for chest pain, palpitations or peripheral edema  GI: NEGATIVE for nausea, abdominal pain, heartburn, or change in bowel habits   male: negative for dysuria, hematuria, decreased urinary stream, erectile dysfunction, urethral discharge  MUSCULOSKELETAL: NEGATIVE for significant arthralgias or myalgia  NEURO: NEGATIVE for weakness, dizziness or paresthesias  PSYCHIATRIC: NEGATIVE for changes in mood or affect    OBJECTIVE:   /89   Pulse 87   Temp 98.8  F (37.1  C) (Oral)   Ht 1.778 m (5' 10\")   Wt 125.2 kg (276 lb)   SpO2 98%   BMI 39.60 kg/m      Physical Exam  GENERAL alert and no distress  EYES:  Normal sclera,conjunctiva, EOMI  HENT: oral and posterior pharynx without lesions or erythema, facies symmetric  NECK: Neck supple. No LAD, without thyroidmegaly.  RESP: Clear to ausculation bilaterally without wheezes or crackles. Normal BS in all fields.  CV: RRR normal S1S2 without murmurs, rubs or gallops.  LYMPH: no cervical lymph adenopathy appreciated  MS: extremities- no gross deformities of the visible extremities noted,   EXT:  no lower extremity edema  PSYCH: Alert and oriented times 3; speech- coherent  SKIN:  No obvious significant skin lesions on visible portions of face         ASSESSMENT/PLAN:     (Z00.00) Routine general medical examination at a health care facility  (primary encounter diagnosis)  Comment: Discussed cardiac disease risk factor modification including screening for and treating HTN, lipids, DM, and smoking cessation.  Also discussed age appropriate cancer screening recommendations including testicular, prostate, colon and lung cancer as dictated by age group.  Recommended low fat, low " salt diet and moderation in any alcohol intake.  Recommended always using seatbelts when in a car.  Recommended never driving after drinking or riding with someone who has been drinking as well.       Plan:     (Z96.652) H/O total knee replacement, left  Comment: doing well since surgery, mild pain, butoverall functioning well.   Plan: aspirin (ASA) 81 MG EC tablet            (I10) Essential hypertension, benign  Comment: This condition is currently controlled on the current medical regimen.  Continue current therapy.   Discussed current hypertension treatment guidelines, including indications for treatment and treatment options.  Discussed the importance for aggressive management of HTN to prevent vascular complications later.  Recommended lower fat, lower carbohydrate, and lower sodium (<2000 mg)diet.  Discussed required intervals for follow up on HTN, lab studies.  Recommened pt. follow their blood pressures outside the clinic to ensure that BPs are remaining within guidelines, and to contact me if the readings are not within guidelines on a regular basis so we can adjust treatment as needed.   Plan: metoprolol succinate ER (TOPROL-XL) 25 MG 24 hr        tablet            (K21.9) Gastroesophageal reflux disease without esophagitis  Comment: This condition is currently controlled on the current medical regimen.  Continue current therapy.   Plan: omeprazole (PRILOSEC) 20 MG DR capsule            (E66.01,  Z68.35) Severe obesity (BMI 35.0-35.9 with comorbidity) (H)  Comment: The patient's current BMI is: Body mass index is 39.6 kg/m ..  Reviewed their weight patterns.   Discussed obesity as a biological preventable and treatable disease, which is associated with significantly increased risk of many acute and chronic health conditions. Obesity has now been recognized as a chronic disease by the American Medical Association.  Discussed serious co-morbidities associated with obesity including increased risk for  "hypertension, stroke, coronary artery disease, dyslipidemia, Type II diabetes, depression, sleep apnea, cancers of the colon, breast and endometrium, obstructive sleep apnea, osteoarthritis and female infertility.  Recommended regular aerobic exercise, recommended improved diet aiming at lowering amount of processed foods, lower sugars, and lower wheat products, and moderation carbs and fat in the diet, establishing more regular meal times, always eating breakfast, front loading some of the calories and adding more protein to the diet.     Briefly discussed bariatric surgery as a consideration, especially in patients with BMI greater than or equal to 35 kg/m2 with multiple complications.     Plan:     (R73.03) Prediabetes  Comment: Reviewed the labs showing elevated glucose levels.    Discussed \"pre-diabetes\", impaired glucose tolerance, and its part in the dysmetabolic syndrome.    Discussed the inevitable progression of impaired glucose tolerance toward worsening diabetes mellitus and the need for agressive interventions now to delay and prevent this inevitable progression.  Discussed the overall risks that dysmetabolic syndromes/impaired glucose tolerance/syndrome X pose toward increased risks of vascular disease as the main reason for agressive intervention now.  Will add medications for glucose control (i.e. metformin, glitazones, etc), lipid (e.g. statins), and for blood pressure (preferably ARBs and ACE) as indicated.  Will start these as early as needed based other proven ability to delay and modify these risk factors.   Discussed the need for aggressive diet control as the cornerstone of pre-diabetes and diabetes management, emphasizing the reduction of \"simple carbohydrates\" (e.g. Any kind of wheat products (e.g. any bread, any pasta), white rice, noodles, potatoes, snack foods, regular soda, juices (except fresh squeezed), cakes, cookies, candy, etc.) along with regular exercise.       Plan:    " "    COUNSELING:   Reviewed preventive health counseling, as reflected in patient instructions       Regular exercise       Healthy diet/nutrition       Vision screening       Hearing screening       Colon cancer screening       Prostate cancer screening    Estimated body mass index is 39.6 kg/m  as calculated from the following:    Height as of this encounter: 1.778 m (5' 10\").    Weight as of this encounter: 125.2 kg (276 lb).          reports that he has never smoked. He has never used smokeless tobacco.      Counseling Resources:  ATP IV Guidelines  Pooled Cohorts Equation Calculator  FRAX Risk Assessment  ICSI Preventive Guidelines  Dietary Guidelines for Americans, 2010  USDA's MyPlate  ASA Prophylaxis  Lung CA Screening    Shayne Suggs MD  [facility]  "

## 2019-09-04 NOTE — PROGRESS NOTES
"  SUBJECTIVE:   CC: Ab Aguilar is an 59 year old male who presents for preventive health visit.     Healthy Habits:    Do you get at least three servings of calcium containing foods daily (dairy, green leafy vegetables, etc.)? { :934986::\"yes\"}    Amount of exercise or daily activities, outside of work: { :659732}    Problems taking medications regularly { :714200::\"No\"}    Medication side effects: { :818467::\"No\"}    Have you had an eye exam in the past two years? { :403886}    Do you see a dentist twice per year? { :222294}    Do you have sleep apnea, excessive snoring or daytime drowsiness?{ :520306}  {Outside tests to abstract? :856307}    {additional problems to add (Optional):467486}    Today's PHQ-2 Score:   PHQ-2 ( 1999 Pfizer) 5/9/2017 2/27/2017   Q1: Little interest or pleasure in doing things 0 0   Q2: Feeling down, depressed or hopeless 0 0   PHQ-2 Score 0 0     {PHQ-2 LOOK IN ASSESSMENTS (Optional) :561929}  Abuse: Current or Past(Physical, Sexual or Emotional)- {YES/NO/NA:555242}  Do you feel safe in your environment? {YES/NO/NA:158254}    Social History     Tobacco Use     Smoking status: Never Smoker     Smokeless tobacco: Never Used   Substance Use Topics     Alcohol use: Yes     Comment: 10-12 beers/week     If you drink alcohol do you typically have >3 drinks per day or >7 drinks per week? {ETOH :373742}                      Last PSA:   PSA   Date Value Ref Range Status   03/05/2019 0.55 0 - 4 ug/L Final     Comment:     Assay Method:  Chemiluminescence using Siemens Vista analyzer       Reviewed orders with patient. Reviewed health maintenance and updated orders accordingly - {Yes/No:422800::\"Yes\"}  {Chronicprobdata (Optional):244084}    Reviewed and updated as needed this visit by clinical staff         Reviewed and updated as needed this visit by Provider        {HISTORY OPTIONS (Optional):126726}    ROS:  { :282535::\"CONSTITUTIONAL: NEGATIVE for fever, chills, change in " "weight\",\"INTEGUMENTARY/SKIN: NEGATIVE for worrisome rashes, moles or lesions\",\"EYES: NEGATIVE for vision changes or irritation\",\"ENT: NEGATIVE for ear, mouth and throat problems\",\"RESP: NEGATIVE for significant cough or SOB\",\"CV: NEGATIVE for chest pain, palpitations or peripheral edema\",\"GI: NEGATIVE for nausea, abdominal pain, heartburn, or change in bowel habits\",\" male: negative for dysuria, hematuria, decreased urinary stream, erectile dysfunction, urethral discharge\",\"MUSCULOSKELETAL: NEGATIVE for significant arthralgias or myalgia\",\"NEURO: NEGATIVE for weakness, dizziness or paresthesias\",\"PSYCHIATRIC: NEGATIVE for changes in mood or affect\"}    OBJECTIVE:   There were no vitals taken for this visit.  EXAM:  {Exam Choices:580294}    {Diagnostic Test Results (Optional):184643::\"Diagnostic Test Results:\",\"Labs reviewed in Epic\"}    ASSESSMENT/PLAN:   {Diag Picklist:340119}    COUNSELING:  {MALE COUNSELING MESSAGES:487488::\"Reviewed preventive health counseling, as reflected in patient instructions\"}    Estimated body mass index is 37.31 kg/m  as calculated from the following:    Height as of 3/14/19: 1.778 m (5' 10\").    Weight as of 3/14/19: 117.9 kg (260 lb).    {Weight Management Plan (ACO) Complete if BMI is abnormal-  Ages 18-64  BMI >24.9.  Age 65+ with BMI <23 or >30 (Optional):619530}     reports that he has never smoked. He has never used smokeless tobacco.  {Tobacco Cessation -- Complete if patient is a smoker (Optional):380973}    Counseling Resources:  ATP IV Guidelines  Pooled Cohorts Equation Calculator  FRAX Risk Assessment  ICSI Preventive Guidelines  Dietary Guidelines for Americans, 2010  USDA's MyPlate  ASA Prophylaxis  Lung CA Screening    Shayne Suggs MD  [facility]  "

## 2019-09-04 NOTE — PATIENT INSTRUCTIONS
"  *  Continue all medications at the same doses.  Contact your usual pharmacy if you need refills.     *  Work on your diet for weight loss.       Preventive Health Recommendations  Male Ages 50 - 64    Yearly exam:             See your health care provider every year in order to  o   Review health changes.   o   Discuss preventive care.    o   Review your medicines if your doctor has prescribed any.     Have a cholesterol test every 5 years, or more frequently if you are at risk for high cholesterol/heart disease.     Have a diabetes test (fasting glucose) every three years. If you are at risk for diabetes, you should have this test more often.     Have a colonoscopy at age 50, or have a yearly FIT test (stool test). These exams will check for colon cancer.      Talk with your health care provider about whether or not a prostate cancer screening test (PSA) is right for you.    You should be tested each year for STDs (sexually transmitted diseases), if you re at risk.     Shots: Get a flu shot each year. Get a tetanus shot every 10 years.     Nutrition:    Eat at least 5 servings of fruits and vegetables daily.     Eat whole-grain bread, whole-wheat pasta and brown rice instead of white grains and rice.     Get adequate Calcium and Vitamin D.        --Good Grains:  Oats, brown rice, Quinoa (these do not raise the blood sugar as much)     --Bad grains:  Anything made from wheat or white rice     (because these raise the blood sugars significantly, and the possible gluten issue from wheat for some people).      --Proteins:  Aim for \"lean proteins\" including chicken, fish, seafood, pork, turkey, and eggs (in moderation); Eat red meat only occasionally         Stan Portillo:                  Lifestyle    Exercise for at least 150 minutes a week (30 minutes a day, 5 days a week). This will help you control your weight and prevent disease.     Limit alcohol to one drink per day.     No smoking.     Wear sunscreen to " prevent skin cancer.     See your dentist every six months for an exam and cleaning.     See your eye doctor every 1 to 2 years.    Preventive Health Recommendations  Male Ages 50 - 64    Yearly exam:             See your health care provider every year in order to  o   Review health changes.   o   Discuss preventive care.    o   Review your medicines if your doctor has prescribed any.     Have a cholesterol test every 5 years, or more frequently if you are at risk for high cholesterol/heart disease.     Have a diabetes test (fasting glucose) every three years. If you are at risk for diabetes, you should have this test more often.     Have a colonoscopy at age 50, or have a yearly FIT test (stool test). These exams will check for colon cancer.      Talk with your health care provider about whether or not a prostate cancer screening test (PSA) is right for you.    You should be tested each year for STDs (sexually transmitted diseases), if you re at risk.     Shots: Get a flu shot each year. Get a tetanus shot every 10 years.     Nutrition:    Eat at least 5 servings of fruits and vegetables daily.     Eat whole-grain bread, whole-wheat pasta and brown rice instead of white grains and rice.     Get adequate Calcium and Vitamin D.     Lifestyle    Exercise for at least 150 minutes a week (30 minutes a day, 5 days a week). This will help you control your weight and prevent disease.     Limit alcohol to one drink per day.     No smoking.     Wear sunscreen to prevent skin cancer.     See your dentist every six months for an exam and cleaning.     See your eye doctor every 1 to 2 years.      *  OBESITY/WEIGHT LOSS:  ====================================================    *  Obesity is a major problem in this country.  Over 2/3 of adult Americans are overweight (BMI Over 25), and 1/3 are obese (BMI over 30)    *  Obesity is the leading cause of diabetes type II, which currently affects 1 out of 10 adult Americans and is  "projected to potentially affect 1 out of 3 adult Americans by 2040    *  Chronic obesity leads to \"insulin resistance\" which affects the carbohydrate (sugar) metabolism causing \"diabetisy\" which leads to type II Diabetes, increased visceral fat, a chronic low grade inflammatory state that leads to higher rates of vascular disease among other things.     *  Obesity is defined as BMI (body mass index) greater than 30.    *  Morbid obesity is defined as BMI over 40    Your most recent Body mass index is:  Body mass index is 39.6 kg/m .    The main principles in weight loss are diet and exercise. You need to have both.      + Be physically active. Do activities that you enjoy, give you energy and are safe for you to do.Gradually build up the intensity (how hard your body is working) of activity. Long-term, aim for 10,000 steps OR 30 minutes or more of activity most days of the week.     + Eat real (not processed) food. Eat mostly vegetables, fruit, whole grains and lean proteins. That way, you--not food manufacturers--control the ingredients that go into your meals.    +  Never eat a single food item with more than 6 ingredients listed on the label.    +  Keep your food shopping to the outside of the grocery store, do not eat foods that come from a bag or box, do not eat foods with bar codes.    + Aim for 5 servings of fruits and vegetables a day. Choose a variety of vegetables with different colors. Have fresh fruit for dessert. Limit  deep-fried vegetables, such as french fries.    + Choose lean protein, such as chicken or fish. Try non-meat sources of protein such as beans, soy and other legumes.    + Choose whole grains. Whole grain foods, such as whole-wheat bread, brown rice, barley, quinoa and oatmeal, contain the entire grain kernel and are better for your health. Limit refined grains, such as white bread and rice.    + Satisfy hunger with unsaturated fat. Fat helps you feel satisfied. Choose unsaturated fats, " "such as canola or olive oils, nuts and seeds, oil-based dressings and avocados. Limit saturated fats, which are found in animal products and some plant oils, such as coconut and palm oils.    + Pay attention to portion sizes. Use smaller plates, bowls and glasses. Portion out foods before you eat.    +  Use the \"fork rule\" for all eating. [If you can't pick food up with a fork, then the food will not turn off hunger very well. Using this rule helps patients avoid choosing the wrong types of food, especially if you have had a bariatric surgery operation.   The \"wrong foods\" include liquid calories such as soup, juice, soda, slimfast, ice cream, and beer, crumbly foods such as chips, crackers, and cookies, and starch based foods such as pasta, too much rice, and too much bread.]     + Drink water or unsweetened beverages. Avoid soda, sweetened coffees and teas, energy drinks and sports drinks, which are full of added sugar that your body does not need. Water is always the best option.    +  Drink one large glass of water BEFORE each meal.  This not only helps guard against dehydration, but it also helps provide additional \"fullness\" that will help reduce the amount of food that you will consume in a given meal by helping you fill up earlier.      + Eat mindfully. Take time to fully enjoy your food and pay attention to what you are eating. Make meals last 15 to 30 minutes. This gives your body a chance to become satisfied and tell your brain to stop eating. Pay attention to what you are eating, rather than doing other activities such as watching TV or driving. This helps you pay attention to your body s signals of hunger and fullness.    + Share meals when eating at restaurants, or put half of the entrée in a to-go container before you start eating.    +  Figure out what kind of calories you are taking in now at this time.  It is hard to change behaviors that you do not understand.      +  Keep your calorie intake at " "least less than 1500 per day to start (under 1300 total if you want to be more aggressive), divided between 3 meals (try to have no meal more than 500 calories) and 2 snacks.      +  \"Learn what 500 calories looks like\" and try to keep all meals under 500 calories.      +  Try to eat breakfast every day.  Skipping meals has been shown to be one of the factors that correlates the highest with obesity, (even more than fast food).  Try to avoid the typical carbohydrates and sugars that dominate the typical American breakfast.  Try to get more protein in earlier in the day, this will make you less hungry later.       +  Try High Protein Ensure or Boost nutritional supplements (or similar versions) for breakfast if nothing else.      +  Avoid \"manufactured foods\" as much as possible.  My definition of a \"manufactured\" or \"processed\" food is any single food product that has more than 6 ingredients.     +  Try to eat three smaller meals a day every day:  breakfast, lunch, and supper.    +  I NEVER recommend over the counter diet aids such as Metabolife or Dexatrim since they have a high risk of side effects mainly fast heart rate, insomnia, and nervousness.    +  It is very hard to lose weight with diet changes alone.  You need to have regular exercise.  You should aim for either 3 hours per week total of cumulative physical aerobic activity or 10,000 steps per day of activity.  Buy a pedometer and keep track of your activity.  If your typical daily activity does not add up to 10,000 total steps, then make up the difference with walking or some sort of aerobic activity.          Good resources for nutrition are:         --Good Grains:  Oats, brown rice, Quinoa (these do not raise the blood sugar as much)     --Bad grains:  Anything made from wheat or white rice     (because these raise the blood sugars significantly, and the possible gluten issue from wheat for some people).      --Proteins:  Aim for \"lean proteins\" " "including chicken, fish, seafood, pork, turkey, and eggs (in moderation); Eat red meat only occasionally      ---> \"Wheat Belly\" by Salas Harrington  (a book about the many bad things processed wheat products (i.e. Bread) have caused in our country...which I believe has serious merit)       --->  \"The Paleo Diet\"  By Zulema Alaniz.             --->\"In Defense of Food\"  Of \"Food Rules\" (Dieter Portillo) a book that goes into the causes obesity epidemic in our country    Stan Portillo:                    ---> \"Picture Perfect Weight Loss\" by Franky Smith (another good book about choices)        ---> \"Eat This, Not That\" Series,  by Sukhdeep Brar  (a book about food choices in the modern world)              ---> \"The Blood Sugar Solution\" by Aj Osorio ( a book about obesity and insulin resistance leading to \"diabesity\")           Charline Oneal ( a guidebook for counting calories, and knowing the components of the food that you eat)       \"The Best Life Diet\"  (a complete diet program)             Aim for a Healthy Weight Web Page at www.nhlbi.nih.gov       [place] Diet       Gregoriolaw Hawkins:  \"Eat More, Weigh Less\" or \"The Program for the Reversal of Heart Disease\"       [facility] web site  the food and nutrition link.       American Heart Association       American Diabetes Association (www.diabetes.org)         "

## 2019-09-30 ENCOUNTER — HEALTH MAINTENANCE LETTER (OUTPATIENT)
Age: 59
End: 2019-09-30

## 2019-09-30 DIAGNOSIS — F51.01 PRIMARY INSOMNIA: ICD-10-CM

## 2019-09-30 NOTE — TELEPHONE ENCOUNTER
ambien      Last Written Prescription Date:  6/17/19  Last Fill Quantity: 30,   # refills: 3  Last Office Visit: 9/4/19  Future Office visit:       Routing refill request to provider for review/approval because:  Drug not on the [facility], P or [facility] refill protocol or controlled substance

## 2019-10-01 RX ORDER — ZOLPIDEM TARTRATE 10 MG/1
TABLET ORAL
Qty: 30 TABLET | Refills: 3 | Status: SHIPPED | OUTPATIENT
Start: 2019-10-01 | End: 2020-01-20

## 2019-10-26 ENCOUNTER — OFFICE VISIT (OUTPATIENT)
Dept: URGENT CARE | Facility: URGENT CARE | Age: 59
End: 2019-10-26
Payer: COMMERCIAL

## 2019-10-26 VITALS
HEART RATE: 111 BPM | DIASTOLIC BLOOD PRESSURE: 90 MMHG | TEMPERATURE: 98.5 F | SYSTOLIC BLOOD PRESSURE: 156 MMHG | RESPIRATION RATE: 20 BRPM | WEIGHT: 280 LBS | BODY MASS INDEX: 40.18 KG/M2

## 2019-10-26 DIAGNOSIS — H60.312 ACUTE DIFFUSE OTITIS EXTERNA OF LEFT EAR: Primary | ICD-10-CM

## 2019-10-26 DIAGNOSIS — J34.3 NASAL TURBINATE HYPERTROPHY: ICD-10-CM

## 2019-10-26 PROCEDURE — 99203 OFFICE O/P NEW LOW 30 MIN: CPT | Performed by: PHYSICIAN ASSISTANT

## 2019-10-26 RX ORDER — FLUTICASONE PROPIONATE 50 MCG
2 SPRAY, SUSPENSION (ML) NASAL DAILY
Qty: 16 G | Refills: 0 | Status: SHIPPED | OUTPATIENT
Start: 2019-10-26 | End: 2019-11-17

## 2019-10-26 RX ORDER — CEFDINIR 300 MG/1
300 CAPSULE ORAL 2 TIMES DAILY
Qty: 14 CAPSULE | Refills: 0 | Status: SHIPPED | OUTPATIENT
Start: 2019-10-26 | End: 2019-11-02

## 2019-10-26 RX ORDER — NEOMYCIN SULFATE, POLYMYXIN B SULFATE, HYDROCORTISONE 3.5; 10000; 1 MG/ML; [USP'U]/ML; MG/ML
3 SOLUTION/ DROPS AURICULAR (OTIC) 4 TIMES DAILY
Qty: 10 ML | Refills: 0 | Status: SHIPPED | OUTPATIENT
Start: 2019-10-26 | End: 2021-12-08

## 2019-10-26 NOTE — PROGRESS NOTES
Patient presents with:  Ear Problem: lt ear pain for 10 days    SUBJECTIVE:   Ab Aguilar is a 59 year old male presenting with a chief complaint of   1) left ear swelling for the past 10 days  2) left ear pain for the past 3 days  Denies any injuries or drainage.    Denies any runny nose or congestion.        Onset of symptoms was as above.  Course of illness is worsening.    Severity moderate  Current and Associated symptoms: as above  Treatment measures tried include None tried.  Predisposing factors include None.    Past Medical History:   Diagnosis Date     Esophageal reflux      Essential hypertension, benign      Major depression      Obesity      Obesity (BMI 35.0-35.9 with comorbidity) (H) 2012     Other and unspecified hyperlipidemia      Other chronic pain     lt knee     Prediabetes 9/26/12    A1C 6.1     Patient Active Problem List   Diagnosis     Essential hypertension, benign     Gastroesophageal reflux disease with esophagitis     Obesity     HYPERLIPIDEMIA LDL GOAL <130     Prediabetes     Severe obesity (BMI 35.0-35.9 with comorbidity) (H)     H/O total knee replacement, left     Social History     Tobacco Use     Smoking status: Never Smoker     Smokeless tobacco: Never Used   Substance Use Topics     Alcohol use: Yes     Comment: 10-12 beers/week       ROS:  CONSTITUTIONAL:NEGATIVE for fever, chills, change in weight  INTEGUMENTARY/SKIN: NEGATIVE for worrisome rashes, moles or lesions  ENT/MOUTH: as per HPI  RESP:NEGATIVE for significant cough or SOB  CV: NEGATIVE for chest pain, palpitations or peripheral edema  GI: NEGATIVE for nausea, abdominal pain, heartburn, or change in bowel habits  : negative for dysuria, hematuria, decreased urinary stream, erectile dysfunction  MUSCULOSKELETAL: NEGATIVE for significant arthralgias or myalgia  NEURO: NEGATIVE for weakness, dizziness or paresthesias  ENDOCRINE: NEGATIVE for temperature intolerance, skin/hair changes  Review of systems negative  except as stated above.    OBJECTIVE  :BP (!) 156/90 (Cuff Size: Adult Large)   Pulse 111   Temp 98.5  F (36.9  C) (Oral)   Resp 20   Wt 127 kg (280 lb)   BMI 40.18 kg/m    GENERAL APPEARANCE: healthy, alert and no distress  EYES: EOMI,  PERRL, conjunctiva clear  HENT: right ear canal is normal.  Both TM's are clear.  Nose with boggy turbinates left greater than right and mouth without ulcers, erythema or lesions  HENT: left ear canal is erythematous and mildly edematous.   NECK: supple, nontender, no lymphadenopathy  RESP: lungs clear to auscultation - no rales, rhonchi or wheezes  CV: regular rates and rhythm, normal S1 S2, no murmur noted  ABDOMEN:  soft, nontender, no HSM or masses and bowel sounds normal  NEURO: Normal strength and tone, sensory exam grossly normal,  normal speech and mentation  SKIN: no suspicious lesions or rashes    (H60.312) Acute diffuse otitis externa of left ear  (primary encounter diagnosis)  Comment:   Plan: neomycin-polymyxin-hydrocortisone (CORTISPORIN)        3.5-27531-9 otic solution, cefdinir (OMNICEF)         300 MG capsule            (J34.3) Nasal turbinate hypertrophy  Comment:   Plan: fluticasone (FLONASE) 50 MCG/ACT nasal spray        Use for minimum of 2 weeks.      Tylenol or ibuprofen as needed.      Follow up with primary clinic should symptoms persist or worsen

## 2019-10-26 NOTE — PATIENT INSTRUCTIONS
(H60.312) Acute diffuse otitis externa of left ear  (primary encounter diagnosis)  Comment:   Plan: neomycin-polymyxin-hydrocortisone (CORTISPORIN)        3.5-92355-9 otic solution, cefdinir (OMNICEF)         300 MG capsule            (J34.3) Nasal turbinate hypertrophy  Comment:   Plan: fluticasone (FLONASE) 50 MCG/ACT nasal spray        Use for minimum of 2 weeks.      Tylenol or ibuprofen as needed.      Follow up with primary clinic should symptoms persist or worsen

## 2019-10-28 ENCOUNTER — APPOINTMENT (OUTPATIENT)
Dept: CT IMAGING | Facility: CLINIC | Age: 59
End: 2019-10-28
Attending: NURSE PRACTITIONER
Payer: COMMERCIAL

## 2019-10-28 ENCOUNTER — OFFICE VISIT (OUTPATIENT)
Dept: INTERNAL MEDICINE | Facility: CLINIC | Age: 59
End: 2019-10-28
Payer: COMMERCIAL

## 2019-10-28 ENCOUNTER — HOSPITAL ENCOUNTER (EMERGENCY)
Facility: CLINIC | Age: 59
Discharge: HOME | End: 2019-10-28
Attending: NURSE PRACTITIONER | Admitting: NURSE PRACTITIONER
Payer: COMMERCIAL

## 2019-10-28 VITALS
TEMPERATURE: 98.9 F | RESPIRATION RATE: 18 BRPM | HEIGHT: 71 IN | SYSTOLIC BLOOD PRESSURE: 194 MMHG | WEIGHT: 280 LBS | OXYGEN SATURATION: 98 % | DIASTOLIC BLOOD PRESSURE: 116 MMHG | HEART RATE: 87 BPM | BODY MASS INDEX: 39.2 KG/M2

## 2019-10-28 VITALS
OXYGEN SATURATION: 99 % | DIASTOLIC BLOOD PRESSURE: 100 MMHG | WEIGHT: 281.9 LBS | BODY MASS INDEX: 40.36 KG/M2 | SYSTOLIC BLOOD PRESSURE: 152 MMHG | TEMPERATURE: 98.4 F | HEART RATE: 82 BPM | HEIGHT: 70 IN

## 2019-10-28 DIAGNOSIS — H70.92 MASTOIDITIS OF LEFT SIDE: ICD-10-CM

## 2019-10-28 DIAGNOSIS — R22.0 SWELLING OF LEFT SIDE OF FACE: ICD-10-CM

## 2019-10-28 DIAGNOSIS — H92.02 LEFT EAR PAIN: ICD-10-CM

## 2019-10-28 DIAGNOSIS — K11.21 PAROTITIS, ACUTE: Primary | ICD-10-CM

## 2019-10-28 DIAGNOSIS — H66.92 OTITIS MEDIA TREATED WITH ANTIBIOTICS IN THE PAST 60 DAYS, LEFT: ICD-10-CM

## 2019-10-28 DIAGNOSIS — H60.92 OTITIS EXTERNA, LEFT: ICD-10-CM

## 2019-10-28 LAB
ANION GAP SERPL CALCULATED.3IONS-SCNC: 4 MMOL/L (ref 3–14)
BASOPHILS # BLD AUTO: 0 10E9/L (ref 0–0.2)
BASOPHILS NFR BLD AUTO: 0.3 %
BUN SERPL-MCNC: 12 MG/DL (ref 7–30)
CALCIUM SERPL-MCNC: 8.8 MG/DL (ref 8.5–10.1)
CHLORIDE SERPL-SCNC: 104 MMOL/L (ref 94–109)
CO2 SERPL-SCNC: 30 MMOL/L (ref 20–32)
CREAT SERPL-MCNC: 0.72 MG/DL (ref 0.66–1.25)
DIFFERENTIAL METHOD BLD: ABNORMAL
EOSINOPHIL # BLD AUTO: 0.1 10E9/L (ref 0–0.7)
EOSINOPHIL NFR BLD AUTO: 1.2 %
ERYTHROCYTE [DISTWIDTH] IN BLOOD BY AUTOMATED COUNT: 14.2 % (ref 10–15)
GFR SERPL CREATININE-BSD FRML MDRD: >90 ML/MIN/{1.73_M2}
GLUCOSE SERPL-MCNC: 105 MG/DL (ref 70–99)
HCT VFR BLD AUTO: 45.5 % (ref 40–53)
HGB BLD-MCNC: 15.1 G/DL (ref 13.3–17.7)
IMM GRANULOCYTES # BLD: 0 10E9/L (ref 0–0.4)
IMM GRANULOCYTES NFR BLD: 0.1 %
LYMPHOCYTES # BLD AUTO: 1.7 10E9/L (ref 0.8–5.3)
LYMPHOCYTES NFR BLD AUTO: 22.7 %
MCH RBC QN AUTO: 29.3 PG (ref 26.5–33)
MCHC RBC AUTO-ENTMCNC: 33.2 G/DL (ref 31.5–36.5)
MCV RBC AUTO: 88 FL (ref 78–100)
MONOCYTES # BLD AUTO: 1.5 10E9/L (ref 0–1.3)
MONOCYTES NFR BLD AUTO: 20 %
NEUTROPHILS # BLD AUTO: 4.2 10E9/L (ref 1.6–8.3)
NEUTROPHILS NFR BLD AUTO: 55.7 %
NRBC # BLD AUTO: 0 10*3/UL
NRBC BLD AUTO-RTO: 0 /100
PLATELET # BLD AUTO: 246 10E9/L (ref 150–450)
POTASSIUM SERPL-SCNC: 3.8 MMOL/L (ref 3.4–5.3)
RBC # BLD AUTO: 5.15 10E12/L (ref 4.4–5.9)
SODIUM SERPL-SCNC: 138 MMOL/L (ref 133–144)
WBC # BLD AUTO: 7.6 10E9/L (ref 4–11)

## 2019-10-28 PROCEDURE — 25000125 ZZHC RX 250: Performed by: NURSE PRACTITIONER

## 2019-10-28 PROCEDURE — 99213 OFFICE O/P EST LOW 20 MIN: CPT | Performed by: INTERNAL MEDICINE

## 2019-10-28 PROCEDURE — 80048 BASIC METABOLIC PNL TOTAL CA: CPT | Performed by: NURSE PRACTITIONER

## 2019-10-28 PROCEDURE — 90471 IMMUNIZATION ADMIN: CPT | Performed by: INTERNAL MEDICINE

## 2019-10-28 PROCEDURE — 85025 COMPLETE CBC W/AUTO DIFF WBC: CPT | Performed by: NURSE PRACTITIONER

## 2019-10-28 PROCEDURE — 90682 RIV4 VACC RECOMBINANT DNA IM: CPT | Performed by: INTERNAL MEDICINE

## 2019-10-28 PROCEDURE — 25000128 H RX IP 250 OP 636: Performed by: NURSE PRACTITIONER

## 2019-10-28 PROCEDURE — 96374 THER/PROPH/DIAG INJ IV PUSH: CPT

## 2019-10-28 PROCEDURE — 70487 CT MAXILLOFACIAL W/DYE: CPT

## 2019-10-28 PROCEDURE — 99285 EMERGENCY DEPT VISIT HI MDM: CPT | Mod: 25

## 2019-10-28 RX ORDER — SULFAMETHOXAZOLE/TRIMETHOPRIM 800-160 MG
1 TABLET ORAL 2 TIMES DAILY
Qty: 20 TABLET | Refills: 0 | Status: SHIPPED | OUTPATIENT
Start: 2019-10-28 | End: 2019-11-07

## 2019-10-28 RX ORDER — PREDNISONE 20 MG/1
TABLET ORAL
Qty: 10 TABLET | Refills: 0 | Status: SHIPPED | OUTPATIENT
Start: 2019-10-28 | End: 2019-11-01

## 2019-10-28 RX ORDER — IOPAMIDOL 755 MG/ML
80 INJECTION, SOLUTION INTRAVASCULAR ONCE
Status: COMPLETED | OUTPATIENT
Start: 2019-10-28 | End: 2019-10-28

## 2019-10-28 RX ORDER — OFLOXACIN 3 MG/ML
5 SOLUTION AURICULAR (OTIC) 2 TIMES DAILY
Qty: 5 ML | Refills: 0 | Status: SHIPPED | OUTPATIENT
Start: 2019-10-28 | End: 2019-11-04

## 2019-10-28 RX ORDER — HYDROCODONE BITARTRATE AND ACETAMINOPHEN 5; 325 MG/1; MG/1
1 TABLET ORAL EVERY 6 HOURS PRN
Qty: 10 TABLET | Refills: 0 | Status: SHIPPED | OUTPATIENT
Start: 2019-10-28 | End: 2019-10-31

## 2019-10-28 RX ADMIN — HYDROMORPHONE HYDROCHLORIDE 1 MG: 1 INJECTION, SOLUTION INTRAMUSCULAR; INTRAVENOUS; SUBCUTANEOUS at 14:50

## 2019-10-28 RX ADMIN — SODIUM CHLORIDE 60 ML: 9 INJECTION, SOLUTION INTRAVENOUS at 14:41

## 2019-10-28 RX ADMIN — IOPAMIDOL 80 ML: 755 INJECTION, SOLUTION INTRAVENOUS at 14:41

## 2019-10-28 ASSESSMENT — ENCOUNTER SYMPTOMS
TROUBLE SWALLOWING: 0
FACIAL SWELLING: 1
VOICE CHANGE: 0
FEVER: 0
NAUSEA: 0
SHORTNESS OF BREATH: 0
VOMITING: 0

## 2019-10-28 ASSESSMENT — MIFFLIN-ST. JEOR
SCORE: 2109.94
SCORE: 2117.2

## 2019-10-28 ASSESSMENT — PATIENT HEALTH QUESTIONNAIRE - PHQ9: SUM OF ALL RESPONSES TO PHQ QUESTIONS 1-9: 2

## 2019-10-28 NOTE — ED AVS SNAPSHOT
Emergency Department  [address] [place] [id number]  Phone:  [phone]  Fax:  [phone]                                    Ab Aguilar   MRN: [id number]    Department:   Emergency Department   Date of Visit:  10/28/2019           After Visit Summary Signature Page    I have received my discharge instructions, and my questions have been answered. I have discussed any challenges I see with this plan with the nurse or doctor.    ..........................................................................................................................................  Patient/Patient Representative Signature      ..........................................................................................................................................  Patient Representative Print Name and Relationship to Patient    ..................................................               ................................................  Date                                   Time    ..........................................................................................................................................  Reviewed by Signature/Title    ...................................................              ..............................................  Date                                               Time          22EPIC Rev 08/18

## 2019-10-28 NOTE — ED TRIAGE NOTES
Pt report 3 days of left ear pain. Went to UC on Saturday, given usual treatment. Ear pain has since gotten worse. Pt went to PCP, who thinks he needs CT for further eval.

## 2019-10-28 NOTE — NURSING NOTE
"Chief Complaint   Patient presents with     Urgent Care     Follow Up     BP (!) 152/100   Pulse 82   Temp 98.4  F (36.9  C) (Temporal)   Ht 1.778 m (5' 10\")   Wt 127.9 kg (281 lb 14.4 oz)   SpO2 99%   BMI 40.45 kg/m   Estimated body mass index is 40.45 kg/m  as calculated from the following:    Height as of this encounter: 1.778 m (5' 10\").    Weight as of this encounter: 127.9 kg (281 lb 14.4 oz).  Medication Reconciliation: complete      Health Maintenance that is potentially due pending provider review:  Colonoscopy/FIT and PHQ9    Pt will schedule Colonoscopy appt.  Completed PHQ9 today.    YVONNE Shane  "

## 2019-10-28 NOTE — LETTER
October 28, 2019      To Whom It May Concern:      Ab Aguilar was seen in our Emergency Department today, 10/28/19.  I expect his condition to improve over the next few days.  He will need to be off work until he sees a specialist in the next 2 days.         Sincerely,      Kanchan Keenan NP

## 2019-10-28 NOTE — ED NOTES
Patient sent by PMD clinic for eval of left ear pain and left facial swelling. Patient reports pain in left ear started 10 days ago. Pain became worse and went to UC on Thursday. Patient was prescribed ear drops and oral antibiotics. Today patient was at the clinic and pain was worse. PMD sent patient to ED for further eval, CT scan and possible IV antibiotic therapy.

## 2019-10-28 NOTE — PROGRESS NOTES
"Subjective     Ab Aguilar is a 59 year old male who presents to clinic today for the following health issues:    HPI   ED/UC Followup:    Facility:  [facility]   Date of visit: 10/26/2019  Reason for visit: LT ear pain   Current Status: sx have worsened, cortisporin ear drops cause irritation        Seen in urgent care 3 days ago, diagnosed with left otitis externa.  Given eardrops and Omnicef.  No fevers, no chills.  Pain is been consistently worsening over the last 3 days despite antibiotics and eardrops.  Denies purulent discharge from the ear or inside of his mouth.  Pain localized in the left anterior jaw area, over the parotid gland.  Left face is swollen.  No diarrhea.          Reviewed and updated as needed this visit by Provider         Review of Systems         Objective    BP (!) 152/100   Pulse 82   Temp 98.4  F (36.9  C) (Temporal)   Ht 1.778 m (5' 10\")   Wt 127.9 kg (281 lb 14.4 oz)   SpO2 99%   BMI 40.45 kg/m    Body mass index is 40.45 kg/m .  Physical Exam                 Past Medical History:  ---------------------------  Past Medical History:   Diagnosis Date     Esophageal reflux      Essential hypertension, benign      Major depression      Obesity      Obesity (BMI 35.0-35.9 with comorbidity) (H) 2012     Other and unspecified hyperlipidemia      Other chronic pain     lt knee     Prediabetes 9/26/12    A1C 6.1       Past Surgical History:  ---------------------------  Past Surgical History:   Procedure Laterality Date     ARTHROPLASTY KNEE Left 3/14/2019    Procedure: Left total knee arthroplasty;  Surgeon: Zulema Lacey MD;  Location: [facility] OR     C NONSPECIFIC PROCEDURE  1991    R ankle fracture, repair     C NONSPECIFIC PROCEDURE  age 6    B inguinal hernia repair     C NONSPECIFIC PROCEDURE  9/01    lumbar laminectomy     ORTHOPEDIC SURGERY      rt knee replacement 2017, rt rotator cuff repair 6 years ago     STRESS ECHO (METRO)  10/02    normal      " STRESS ECHO (METRO)  9/05    normal stress echo       Current Medications:  ---------------------------  Current Outpatient Medications   Medication Sig Dispense Refill     aspirin (ASA) 81 MG EC tablet Take 1 tablet (81 mg) by mouth daily       cefdinir (OMNICEF) 300 MG capsule Take 1 capsule (300 mg) by mouth 2 times daily for 7 days 14 capsule 0     fluticasone (FLONASE) 50 MCG/ACT nasal spray Spray 2 sprays into both nostrils daily 16 g 0     ibuprofen (ADVIL,MOTRIN) 200 MG tablet Take 800 mg by mouth 3 times daily as needed.       lisinopril (PRINIVIL/ZESTRIL) 40 MG tablet Take 1 tablet (40 mg) by mouth daily 90 tablet 3     metoprolol succinate ER (TOPROL-XL) 25 MG 24 hr tablet Take 1 tablet (25 mg) by mouth daily 90 tablet 1     neomycin-polymyxin-hydrocortisone (CORTISPORIN) 3.5-32652-8 otic solution Place 3 drops in ear(s) 4 times daily 10 mL 0     omeprazole (PRILOSEC) 20 MG DR capsule Take 2 capsules (40 mg) by mouth daily       pravastatin (PRAVACHOL) 40 MG tablet Take 1 tablet (40 mg) by mouth daily 90 tablet 3     sucralfate (CARAFATE) 1 GM tablet TAKE 1 TABLET(1 GRAM) BY MOUTH FOUR TIMES DAILY AS NEEDED 360 tablet 1     zolpidem (AMBIEN) 10 MG tablet TAKE 1/2 OR 1 TAB BY MOUTH NIGHTLY AT BEDTIME AS NEEDED FOR SLEEP 30 tablet 3       Allergies:  -------------  Allergies   Allergen Reactions     Atorvastatin GI Disturbance     Abdominal and intestinal pains from atorvastatin       Social History:  -------------------  Social History     Socioeconomic History     Marital status:      Spouse name: Not on file     Number of children: Not on file     Years of education: Not on file     Highest education level: Not on file   Occupational History     Not on file   Social Needs     Financial resource strain: Not on file     Food insecurity:     Worry: Not on file     Inability: Not on file     Transportation needs:     Medical: Not on file     Non-medical: Not on file   Tobacco Use     Smoking status:  Never Smoker     Smokeless tobacco: Never Used   Substance and Sexual Activity     Alcohol use: Yes     Comment: 10-12 beers/week     Drug use: No     Sexual activity: Yes     Partners: Female   Lifestyle     Physical activity:     Days per week: Not on file     Minutes per session: Not on file     Stress: Not on file   Relationships     Social connections:     Talks on phone: Not on file     Gets together: Not on file     Attends Taoist service: Not on file     Active member of club or organization: Not on file     Attends meetings of clubs or organizations: Not on file     Relationship status: Not on file     Intimate partner violence:     Fear of current or ex partner: Not on file     Emotionally abused: Not on file     Physically abused: Not on file     Forced sexual activity: Not on file   Other Topics Concern      Service Not Asked     Blood Transfusions Not Asked     Caffeine Concern No     Occupational Exposure Not Asked     Hobby Hazards Not Asked     Sleep Concern Not Asked     Stress Concern Not Asked     Weight Concern Not Asked     Special Diet Not Asked     Back Care Not Asked     Exercise Not Asked     Bike Helmet Not Asked     Seat Belt Not Asked     Self-Exams Not Asked     Parent/sibling w/ CABG, MI or angioplasty before 65F 55M? Not Asked   Social History Narrative     Not on file       Family Medical History:  ------------------------------  Family History   Problem Relation Age of Onset     Hypertension Father      Heart Disease Brother 37        b. 1967, 1st MI at age 37     Family History Negative Mother      Family History Negative Brother         b. 1965     Family History Negative Sister         b. 1961         ROS:  REVIEW OF SYSTEMS:    RESP: negative for cough, dyspnea, wheezing, hemoptysis  CV: negative for chest pain, palpitations, PND, MOSCOSO, orthopnea  GI: negative for  N/V, pain, melena, diarrhea and constipation  NEURO: negative for new numbness/tingling, paralysis,  "incoordination, or focal weakness     OBJECTIVE:                                                    BP (!) 152/100   Pulse 82   Temp 98.4  F (36.9  C) (Temporal)   Ht 1.778 m (5' 10\")   Wt 127.9 kg (281 lb 14.4 oz)   SpO2 99%   BMI 40.45 kg/m       GENERAL alert and moderately uncomfortable.  EYES:  Normal sclera,conjunctiva, EOMI  HEENT:  Left lateral face noticably swollen. Left outer ear swollen, erythematous, medial pinna swollen.   Significant tenderness over left parotid gland, reproduces his pain exactly.   NECK: Neck supple. No LAD, without thyroidmegaly.  RESP: Clear to ausculation bilaterally without wheezes or crackles. Normal BS in all fields.  CV: RRR normal S1S2 without murmurs, rubs or gallops.  LYMPH: no cervical lymph adenopathy appreciated  MS: extremities- no gross deformities of the visible extremities noted,   EXT:  no lower extremity edema  PSYCH: Alert and oriented times 3; speech- coherent  SKIN:  No obvious significant skin lesions on visible portions of face          ASSESSMENT/PLAN:                                                      (K11.21) Parotitis, acute  (primary encounter diagnosis)  Comment: Based on the patient's escalating symptoms and current exam, I am most concerned about a parotid gland infection.  Based on his worsening clinical course despite taking oral antibiotics and eardrops, needs to be evaluated urgently in the emergency room for consideration of imaging and possibly IV antibiotics, as acute infections of the parotid gland can turn into deep space infections easily and almost always require IV antibiotics.  Patient is able to speak, breathe, manage secretions well.  Patient agreeable to the plan.    Plan: Patient is going to the emergency room now, I contacted the staff to alert them of his arrival.      (R22.0) Swelling of left side of face  Comment:   Plan:        See Patient Instructions    FRANK BUTLER M.D., MD  [facility]    (Chart " documentation may have been completed, in part, with [employer] voice-recognition software. Even though reviewed, some grammatical, spelling, and word errors may remain.)

## 2019-10-28 NOTE — ED PROVIDER NOTES
History     Chief Complaint:  Otalgia      HPI   Ab Aguilar is a 59 year old male with hypertension and hyperlipidemia who presents with left sided otalgia. The patient says that the left ear swelling and pain started on 10/24. He says that he went to urgent care on 10/26 and was given ear drops for an ear infection, he says that the drops made the pain worse. He says that he went to his PCP this morning and was told to come to the ED for further evaluation and CT scan. The patient says that the ear pain feels as if there are needles sticking him on the inside and that he has been unable to chew due to the pain, he also notes that he has not slept for 2 days. He denies any fever, chest pain or shortness of breath, or any travel or swimming.       Allergies:  Atorvastatin      Medications:    Aspirin  Omnicef  Flonase  Ibuprofen  Lisinopril   Toprol  Cortisporin  Prilosec  Pravachol  Carafate  Ambien      Past Medical History:    Prediabetes  Hyperlipidemia  Obesity  Major depression  Esophageal reflux  GERD    Past Surgical History:    Arthroplasty  Knee  Right ankle fracture repair  Inguinal hernia repair  Lumbar laminectomy  Orthopedic surgery     Family History:    hypertension  Heart disease     Social History:  Smoking Status: Never Smoker  Smokeless Tobacco: Never Used  Alcohol Use: Positive  Drug Use: Negative  PCP: Shayne Suggs   Marital Status:       Review of Systems   Constitutional: Negative for fever.   HENT: Positive for ear pain and facial swelling. Negative for trouble swallowing and voice change.    Respiratory: Negative for shortness of breath.    Cardiovascular: Negative for chest pain.   Gastrointestinal: Negative for nausea and vomiting.   All other systems reviewed and are negative.        Physical Exam     Patient Vitals for the past 24 hrs:   BP Temp Temp src Pulse Resp SpO2 Height Weight   10/28/19 1500 (!) 176/103 -- -- 83 -- 95 % -- --   10/28/19 1358 (!) 170/106  "-- -- -- -- -- -- --   10/28/19 1354 -- 98.9  F (37.2  C) Oral 90 18 97 % 1.803 m (5' 11\") 127 kg (280 lb)        Physical Exam  Physical Exam   Constitutional: Non toxic appearing.   Head: Head moves freely with normal range of motion. Nose with mucosal edema, clear rhinorrhea.   ENT: Oropharynx is clear and moist. No posterior oropharynx erythema, edema or exudate. Tonsillar pilars and folds seen with no fullness. No trismus. No submandibular fullness. Erythema and edema over left TMJ extending to the tragus and left ear canal. Mastoid tenderness. Unable to visualize the left TM due to ear canal edema, no active drainage from the ear canal. The pinna is not edematous.   Intraoral exam: no purulence at the salivary duct.  Eyes: Conjunctivae pink. EOMs intact.  Neck: Normal range of motion. No cervical or supraclavicular lymphadenopathy. No nuchal rigidity.   Cardiovascular: Regular rate and rhythm. Normal heart sounds. No concerning murmur.  Pulmonary/Chest: No respiratory distress. No use of accessory muscles. Breath sounds normal. No decreased breath sounds. No wheezes. No rhonchi. No rales.   Abdominal: Soft. Non-tender. No rebound, no guarding.  Musculoskeletal: No peripheral edema. Distal capillary refill and sensation intact.   Neurological: Oriented to person, place, and time. No focal deficits.   Skin: Skin is warm. No rash noted.       Emergency Department Course     Imaging:  Radiology findings were communicated with the patient who voiced understanding of the findings.    CT Facial Bones with Contrast  Left-sided inflammatory process involving the left  external ear canal, the left middle ear cavity and left mastoid air  cells without any definite bony erosion or any soft tissue abscess.  The findings are consistent with otitis externa with accompanying  otitis media and possibly early mastoiditis although the right mastoid  changes could also just be due to some postobstructive fluid.  PRINCESS WYLIE, " MD  Reading per radiology    Laboratory:  Laboratory findings were communicated with the patient who voiced understanding of the findings.    CBC: WBC 7.6, HGB 15.1,   BMP:  (H) o/w WNL (Creatinine 0.72)    Interventions:  1450 Dilaudid 1 mg IV    Emergency Department Course:    1402 Nursing notes and vitals reviewed.    1416 I performed an exam of the patient as documented above.     1426 IV was inserted and blood was drawn for laboratory testing, results above.     1436 The patient was sent for a CT while in the emergency department, results above.      1526 I spoke with Dr. Baum of the ENT service regarding patient's presentation, findings, and plan of care.     1530 Patient rechecked and updated.       The patient is discharged to home.     Impression & Plan      Medical Decision Making:  Ab Aguilar is a 59 year old male who presents to the emergency department today for evaluation of left facial and left ear pain and swelling. He was treated with cortisporin drops and Cefdinir for OE and OM 2 days ago, but feels worse today. He was seen in clinic and sent here for further evaluation. On my exam he has extending edema from the ear canal and tragus into the face over the TMJ. No exam evidence of perichondritis, ludwigs angina or parotitis. Given amount of edema and pain I am concerned for possible abscess. He is afebrile. WBC is normal. CT with contrast reveals no abscess, early onset mastoiditis and left OE and OM. I discussed this with ENT, Dr Baum, and they will follow up in clinic with him in the next 1-2 days. We discussed oral Bactrim as they are finding more staph infections, we also discussed +/- additional coverage for pseudomonas with oral Cipro, treating with stress dosing of oral steroids, and changing the ear drops to Ofloxacin. Given close follow up and patient seeming overwhelmed with the medication changes I opted not to start both Cipro and Bactrim and will start with  bactrim alone + ofloxacin + prednisone and Norco prn pain. We discussed reasons to return here. He had some pain relief with IV Dilaudid. He is amenable to plan.       Diagnosis:    ICD-10-CM    1. Otitis externa, left H60.92    2. Otitis media treated with antibiotics in the past 60 days, left H66.92    3. Mastoiditis of left side H70.92    4. Left ear pain H92.02      Disposition:   Findings and plan explained to the Patient. Patient discharged home with instructions regarding supportive care, medications, and reasons to return. The importance of close follow-up was reviewed.     Discharge Medications:  New Prescriptions    HYDROCODONE-ACETAMINOPHEN (NORCO) 5-325 MG TABLET    Take 1 tablet by mouth every 6 hours as needed for severe pain    OFLOXACIN (FLOXIN) 0.3 % OTIC SOLUTION    Place 5 drops Into the left ear 2 times daily for 7 days    PREDNISONE (DELTASONE) 20 MG TABLET    Take two tablets (= 40mg) each day for 5 (five) days    SULFAMETHOXAZOLE-TRIMETHOPRIM (BACTRIM DS) 800-160 MG TABLET    Take 1 tablet by mouth 2 times daily for 10 days       Scribe Disclosure:  I, Arthur Ledesma, am serving as a scribe at 2:08 PM on 10/28/2019 to document services personally performed by Kanchan Keenan based on my observations and the provider's statements to me.       EMERGENCY DEPARTMENT       Kanchan Keenan APRN CNP  10/28/19 [id number]

## 2019-10-30 ENCOUNTER — TRANSFERRED RECORDS (OUTPATIENT)
Dept: HEALTH INFORMATION MANAGEMENT | Facility: CLINIC | Age: 59
End: 2019-10-30

## 2019-11-11 ENCOUNTER — HOSPITAL LABORATORY (OUTPATIENT)
Dept: OTHER | Facility: CLINIC | Age: 59
End: 2019-11-11

## 2019-11-13 LAB
BACTERIA SPEC CULT: ABNORMAL
BACTERIA SPEC CULT: ABNORMAL
Lab: ABNORMAL
SPECIMEN SOURCE: ABNORMAL

## 2019-11-17 DIAGNOSIS — J34.3 NASAL TURBINATE HYPERTROPHY: ICD-10-CM

## 2019-11-30 RX ORDER — FLUTICASONE PROPIONATE 50 MCG
SPRAY, SUSPENSION (ML) NASAL
Qty: 16 ML | Refills: 0 | Status: SHIPPED | OUTPATIENT
Start: 2019-11-30 | End: 2021-12-08

## 2019-12-23 DIAGNOSIS — K21.00 GASTROESOPHAGEAL REFLUX DISEASE WITH ESOPHAGITIS: ICD-10-CM

## 2019-12-23 RX ORDER — SUCRALFATE 1 G/1
TABLET ORAL
Qty: 360 TABLET | Refills: 2 | Status: SHIPPED | OUTPATIENT
Start: 2019-12-23 | End: 2020-08-27

## 2019-12-23 NOTE — TELEPHONE ENCOUNTER
"Requested Prescriptions   Pending Prescriptions Disp Refills     sucralfate (CARAFATE) 1 GM tablet [Pharmacy Med Name: SUCRALFATE 1 GM TABLET] 360 tablet 1     Sig: TAKE 1 TABLET(1 GRAM) BY MOUTH FOUR TIMES DAILY AS NEEDED       Miscellaneous Gastrointestinal Agents Passed - 12/23/2019  3:40 AM        Passed - Recent (12 mo) or future (30 days) visit within the authorizing provider's specialty     Patient has had an office visit with the authorizing provider or a provider within the authorizing providers department within the previous 12 mos or has a future within next 30 days. See \"Patient Info\" tab in inbasket, or \"Choose Columns\" in Meds & Orders section of the refill encounter.              Passed - Medication is active on med list        Passed - Patient is 18 years of age or older          "

## 2020-01-20 DIAGNOSIS — F51.01 PRIMARY INSOMNIA: ICD-10-CM

## 2020-01-20 RX ORDER — ZOLPIDEM TARTRATE 10 MG/1
TABLET ORAL
Qty: 30 TABLET | Refills: 3 | Status: SHIPPED | OUTPATIENT
Start: 2020-01-20 | End: 2020-01-24

## 2020-01-20 NOTE — TELEPHONE ENCOUNTER
ambien      Last Written Prescription Date:  10/1/19  Last Fill Quantity: 30,   # refills: 3  Last Office Visit: 10/28/19  Future Office visit:       Routing refill request to provider for review/approval because:  Drug not on the [facility], P or [facility] refill protocol or controlled substance

## 2020-01-24 ENCOUNTER — TELEPHONE (OUTPATIENT)
Dept: INTERNAL MEDICINE | Facility: CLINIC | Age: 60
End: 2020-01-24

## 2020-01-24 DIAGNOSIS — F51.01 PRIMARY INSOMNIA: ICD-10-CM

## 2020-01-24 RX ORDER — ZOLPIDEM TARTRATE 10 MG/1
5-10 TABLET ORAL
Qty: 30 TABLET | Refills: 3 | Status: SHIPPED | OUTPATIENT
Start: 2020-01-24 | End: 2020-05-11

## 2020-01-24 NOTE — TELEPHONE ENCOUNTER
Pt's wife called. Pharmacy never received this med. Looks like the transmission failed. Please resend.    zolpidem (AMBIEN) 10 MG tablet

## 2020-02-24 ENCOUNTER — TRANSFERRED RECORDS (OUTPATIENT)
Dept: HEALTH INFORMATION MANAGEMENT | Facility: CLINIC | Age: 60
End: 2020-02-24

## 2020-03-27 DIAGNOSIS — E78.5 HYPERLIPIDEMIA LDL GOAL <130: ICD-10-CM

## 2020-03-28 NOTE — TELEPHONE ENCOUNTER
"Requested Prescriptions   Pending Prescriptions Disp Refills     pravastatin (PRAVACHOL) 40 MG tablet [Pharmacy Med Name: PRAVASTATIN SODIUM 40 MG TAB] 90 tablet 3     Sig: TAKE 1 TABLET BY MOUTH EVERY DAY   Last Written Prescription Date:  3/5/2019  Last Fill Quantity: 90,  # refills: 3   Last Office Visit: 10/28/2019   Future Office Visit:         Statins Protocol Failed - 3/27/2020  9:47 PM        Failed - LDL on file in past 12 months     Recent Labs   Lab Test 03/05/19  1115   *             Passed - No abnormal creatine kinase in past 12 months     No lab results found.             Passed - Recent (12 mo) or future (30 days) visit within the authorizing provider's specialty     Patient has had an office visit with the authorizing provider or a provider within the authorizing providers department within the previous 12 mos or has a future within next 30 days. See \"Patient Info\" tab in inbasket, or \"Choose Columns\" in Meds & Orders section of the refill encounter.              Passed - Medication is active on med list        Passed - Patient is age 18 or older             "

## 2020-03-30 RX ORDER — PRAVASTATIN SODIUM 40 MG
TABLET ORAL
Qty: 90 TABLET | Refills: 1 | Status: SHIPPED | OUTPATIENT
Start: 2020-03-30 | End: 2020-08-20

## 2020-05-10 DIAGNOSIS — F51.01 PRIMARY INSOMNIA: ICD-10-CM

## 2020-05-11 RX ORDER — ZOLPIDEM TARTRATE 10 MG/1
5-10 TABLET ORAL
Qty: 30 TABLET | Refills: 3 | Status: SHIPPED | OUTPATIENT
Start: 2020-05-11 | End: 2020-08-28

## 2020-05-11 NOTE — TELEPHONE ENCOUNTER
Controlled Substance Refill Request for zolpidem (AMBIEN) 10 MG tablet  Problem List Complete:  No     PROVIDER TO CONSIDER COMPLETION OF PROBLEM LIST AND OVERVIEW/CONTROLLED SUBSTANCE AGREEMENT    Last Written Prescription Date:  01/24/20  Last Fill Quantity: 30,   # refills: 3    THE MOST RECENT OFFICE VISIT MUST BE WITHIN THE PAST 3 MONTHS. AT LEAST ONE FACE TO FACE VISIT MUST OCCUR EVERY 6 MONTHS. ADDITIONAL VISITS CAN BE VIRTUAL.  (THIS STATEMENT SHOULD BE DELETED.)    Last Office Visit with [facility] primary care provider: 10/28/19 Ifeanyi    Future Office visit:     Controlled substance agreement:   Encounter-Level CSA:    There are no encounter-level csa.     Patient-Level CSA:    There are no patient-level csa.         Last Urine Drug Screen: No results found for: CDAUT, No results found for: COMDAT, No results found for: THC13, PCP13, COC13, MAMP13, OPI13, AMP13, BZO13, TCA13, MTD13, BAR13, OXY13, PPX13, BUP13     Processing:  Rx to be electronically transmitted to pharmacy by provider      [email or web address]       checked in past 3 months?  No, route to RN

## 2020-05-11 NOTE — TELEPHONE ENCOUNTER
Routing refill request to provider for review/approval because:  Drug not on the [facility] refill protocol     RN not a delegate to check  for this provider

## 2020-05-22 DIAGNOSIS — I10 ESSENTIAL HYPERTENSION, BENIGN: ICD-10-CM

## 2020-05-22 RX ORDER — METOPROLOL SUCCINATE 25 MG/1
25 TABLET, EXTENDED RELEASE ORAL DAILY
Qty: 90 TABLET | Refills: 0 | Status: SHIPPED | OUTPATIENT
Start: 2020-05-22 | End: 2020-08-18

## 2020-05-22 NOTE — TELEPHONE ENCOUNTER
Please call the patient.      Prescription(s) sent electronically to specified pharmacy.    Patient needs follow up appointment in the clinic to recheck the blood pressure once we are seeing patients again in July.  Schedule something then    Close encounter when done.

## 2020-05-22 NOTE — TELEPHONE ENCOUNTER
Routing refill request to provider for review/approval because:  BP not at goal    BP Readings from Last 3 Encounters:   10/28/19 (!) 194/116   10/28/19 (!) 152/100   10/26/19 (!) 156/90       Donna DHILLONN, RN, PHN

## 2020-05-25 DIAGNOSIS — I10 ESSENTIAL HYPERTENSION, BENIGN: ICD-10-CM

## 2020-05-26 RX ORDER — LISINOPRIL 40 MG/1
TABLET ORAL
Qty: 90 TABLET | Refills: 0 | Status: SHIPPED | OUTPATIENT
Start: 2020-05-26 | End: 2020-08-20

## 2020-08-08 ENCOUNTER — DOCUMENTATION ONLY (OUTPATIENT)
Dept: LAB | Facility: CLINIC | Age: 60
End: 2020-08-08

## 2020-08-08 DIAGNOSIS — Z12.5 SCREENING FOR PROSTATE CANCER: ICD-10-CM

## 2020-08-08 DIAGNOSIS — E78.5 HYPERLIPIDEMIA LDL GOAL <130: ICD-10-CM

## 2020-08-08 DIAGNOSIS — R73.03 PREDIABETES: Primary | ICD-10-CM

## 2020-08-08 DIAGNOSIS — I10 ESSENTIAL HYPERTENSION, BENIGN: ICD-10-CM

## 2020-08-15 DIAGNOSIS — I10 ESSENTIAL HYPERTENSION, BENIGN: ICD-10-CM

## 2020-08-18 DIAGNOSIS — I10 ESSENTIAL HYPERTENSION, BENIGN: ICD-10-CM

## 2020-08-18 DIAGNOSIS — R73.03 PREDIABETES: ICD-10-CM

## 2020-08-18 DIAGNOSIS — E78.5 HYPERLIPIDEMIA LDL GOAL <130: ICD-10-CM

## 2020-08-18 DIAGNOSIS — Z12.5 SCREENING FOR PROSTATE CANCER: ICD-10-CM

## 2020-08-18 LAB
ALBUMIN SERPL-MCNC: 3.9 G/DL (ref 3.4–5)
ALP SERPL-CCNC: 85 U/L (ref 40–150)
ALT SERPL W P-5'-P-CCNC: 118 U/L (ref 0–70)
ANION GAP SERPL CALCULATED.3IONS-SCNC: 7 MMOL/L (ref 3–14)
AST SERPL W P-5'-P-CCNC: 84 U/L (ref 0–45)
BILIRUB SERPL-MCNC: 0.6 MG/DL (ref 0.2–1.3)
BUN SERPL-MCNC: 13 MG/DL (ref 7–30)
CALCIUM SERPL-MCNC: 9.4 MG/DL (ref 8.5–10.1)
CHLORIDE SERPL-SCNC: 105 MMOL/L (ref 94–109)
CHOLEST SERPL-MCNC: 197 MG/DL
CO2 SERPL-SCNC: 27 MMOL/L (ref 20–32)
CREAT SERPL-MCNC: 0.81 MG/DL (ref 0.66–1.25)
ERYTHROCYTE [DISTWIDTH] IN BLOOD BY AUTOMATED COUNT: 13.9 % (ref 10–15)
GFR SERPL CREATININE-BSD FRML MDRD: >90 ML/MIN/{1.73_M2}
GLUCOSE SERPL-MCNC: 121 MG/DL (ref 70–99)
HBA1C MFR BLD: 6.3 % (ref 0–5.6)
HCT VFR BLD AUTO: 48.2 % (ref 40–53)
HDLC SERPL-MCNC: 36 MG/DL
HGB BLD-MCNC: 15.4 G/DL (ref 13.3–17.7)
LDLC SERPL CALC-MCNC: 122 MG/DL
MCH RBC QN AUTO: 29.1 PG (ref 26.5–33)
MCHC RBC AUTO-ENTMCNC: 32 G/DL (ref 31.5–36.5)
MCV RBC AUTO: 91 FL (ref 78–100)
NONHDLC SERPL-MCNC: 161 MG/DL
PLATELET # BLD AUTO: 244 10E9/L (ref 150–450)
POTASSIUM SERPL-SCNC: 4.4 MMOL/L (ref 3.4–5.3)
PROT SERPL-MCNC: 7.8 G/DL (ref 6.8–8.8)
PSA SERPL-ACNC: 0.49 UG/L (ref 0–4)
RBC # BLD AUTO: 5.29 10E12/L (ref 4.4–5.9)
SODIUM SERPL-SCNC: 139 MMOL/L (ref 133–144)
TRIGL SERPL-MCNC: 193 MG/DL
WBC # BLD AUTO: 6 10E9/L (ref 4–11)

## 2020-08-18 PROCEDURE — 83036 HEMOGLOBIN GLYCOSYLATED A1C: CPT | Performed by: INTERNAL MEDICINE

## 2020-08-18 PROCEDURE — 80053 COMPREHEN METABOLIC PANEL: CPT | Performed by: INTERNAL MEDICINE

## 2020-08-18 PROCEDURE — 36415 COLL VENOUS BLD VENIPUNCTURE: CPT | Performed by: INTERNAL MEDICINE

## 2020-08-18 PROCEDURE — G0103 PSA SCREENING: HCPCS | Performed by: INTERNAL MEDICINE

## 2020-08-18 PROCEDURE — 80061 LIPID PANEL: CPT | Performed by: INTERNAL MEDICINE

## 2020-08-18 PROCEDURE — 85027 COMPLETE CBC AUTOMATED: CPT | Performed by: INTERNAL MEDICINE

## 2020-08-18 RX ORDER — METOPROLOL SUCCINATE 25 MG/1
25 TABLET, EXTENDED RELEASE ORAL DAILY
Qty: 90 TABLET | Refills: 0 | Status: SHIPPED | OUTPATIENT
Start: 2020-08-18 | End: 2020-08-20

## 2020-08-18 NOTE — TELEPHONE ENCOUNTER
Future Office visit:    Next 5 appointments (look out 90 days)    Aug 20, 2020  8:40 AM CDT  PHYSICAL with [facility]gs [facility]  [facility] [address] [place] [id number] [phone]           Routing refill request to provider for review/approval because:  Drug not on the [facility], [facility] or  Health refill protocol or controlled substance

## 2020-08-20 ENCOUNTER — OFFICE VISIT (OUTPATIENT)
Dept: INTERNAL MEDICINE | Facility: CLINIC | Age: 60
End: 2020-08-20
Payer: COMMERCIAL

## 2020-08-20 VITALS
DIASTOLIC BLOOD PRESSURE: 92 MMHG | BODY MASS INDEX: 39.27 KG/M2 | WEIGHT: 280.5 LBS | HEART RATE: 78 BPM | SYSTOLIC BLOOD PRESSURE: 144 MMHG | HEIGHT: 71 IN | OXYGEN SATURATION: 98 % | TEMPERATURE: 98 F

## 2020-08-20 DIAGNOSIS — E66.01 SEVERE OBESITY (BMI 35.0-35.9 WITH COMORBIDITY) (H): ICD-10-CM

## 2020-08-20 DIAGNOSIS — M19.042 PRIMARY OSTEOARTHRITIS OF BOTH HANDS: ICD-10-CM

## 2020-08-20 DIAGNOSIS — R73.03 PREDIABETES: ICD-10-CM

## 2020-08-20 DIAGNOSIS — E78.5 HYPERLIPIDEMIA LDL GOAL <130: ICD-10-CM

## 2020-08-20 DIAGNOSIS — Z00.00 ROUTINE GENERAL MEDICAL EXAMINATION AT A HEALTH CARE FACILITY: Primary | ICD-10-CM

## 2020-08-20 DIAGNOSIS — M19.041 PRIMARY OSTEOARTHRITIS OF BOTH HANDS: ICD-10-CM

## 2020-08-20 DIAGNOSIS — R79.89 ABNORMAL LFTS: ICD-10-CM

## 2020-08-20 DIAGNOSIS — K21.9 GASTROESOPHAGEAL REFLUX DISEASE WITHOUT ESOPHAGITIS: ICD-10-CM

## 2020-08-20 DIAGNOSIS — R06.83 SNORINGS: ICD-10-CM

## 2020-08-20 DIAGNOSIS — Z83.49 FAMILY HISTORY OF HEMOCHROMATOSIS: ICD-10-CM

## 2020-08-20 DIAGNOSIS — I10 ESSENTIAL HYPERTENSION, BENIGN: ICD-10-CM

## 2020-08-20 DIAGNOSIS — R40.0 DAYTIME SOMNOLENCE: ICD-10-CM

## 2020-08-20 LAB
FERRITIN SERPL-MCNC: 86 NG/ML (ref 26–388)
IRON SATN MFR SERPL: 17 % (ref 15–46)
IRON SERPL-MCNC: 80 UG/DL (ref 35–180)
TIBC SERPL-MCNC: 472 UG/DL (ref 240–430)

## 2020-08-20 PROCEDURE — 36415 COLL VENOUS BLD VENIPUNCTURE: CPT | Performed by: INTERNAL MEDICINE

## 2020-08-20 PROCEDURE — 82728 ASSAY OF FERRITIN: CPT | Performed by: INTERNAL MEDICINE

## 2020-08-20 PROCEDURE — 83550 IRON BINDING TEST: CPT | Performed by: INTERNAL MEDICINE

## 2020-08-20 PROCEDURE — 99396 PREV VISIT EST AGE 40-64: CPT | Performed by: INTERNAL MEDICINE

## 2020-08-20 PROCEDURE — 83540 ASSAY OF IRON: CPT | Performed by: INTERNAL MEDICINE

## 2020-08-20 PROCEDURE — 99214 OFFICE O/P EST MOD 30 MIN: CPT | Mod: 25 | Performed by: INTERNAL MEDICINE

## 2020-08-20 RX ORDER — LISINOPRIL 40 MG/1
40 TABLET ORAL DAILY
Qty: 90 TABLET | Refills: 1 | Status: SHIPPED | OUTPATIENT
Start: 2020-08-20 | End: 2020-12-17

## 2020-08-20 RX ORDER — HYDROCHLOROTHIAZIDE 25 MG/1
25 TABLET ORAL EVERY MORNING
Qty: 90 TABLET | Refills: 1 | Status: SHIPPED | OUTPATIENT
Start: 2020-08-20 | End: 2020-12-17

## 2020-08-20 RX ORDER — METOPROLOL SUCCINATE 25 MG/1
25 TABLET, EXTENDED RELEASE ORAL DAILY
Qty: 90 TABLET | Refills: 1 | Status: SHIPPED | OUTPATIENT
Start: 2020-08-20 | End: 2021-04-06

## 2020-08-20 RX ORDER — CELECOXIB 200 MG/1
200 CAPSULE ORAL 2 TIMES DAILY
Qty: 60 CAPSULE | Refills: 1 | Status: SHIPPED | OUTPATIENT
Start: 2020-08-20 | End: 2020-10-13

## 2020-08-20 RX ORDER — PRAVASTATIN SODIUM 40 MG
40 TABLET ORAL DAILY
Qty: 90 TABLET | Refills: 1 | Status: SHIPPED | OUTPATIENT
Start: 2020-08-20 | End: 2021-01-14

## 2020-08-20 RX ORDER — OMEPRAZOLE 40 MG/1
40 CAPSULE, DELAYED RELEASE ORAL DAILY
Qty: 90 CAPSULE | Refills: 1 | Status: SHIPPED | OUTPATIENT
Start: 2020-08-20 | End: 2021-04-12

## 2020-08-20 ASSESSMENT — PATIENT HEALTH QUESTIONNAIRE - PHQ9: SUM OF ALL RESPONSES TO PHQ QUESTIONS 1-9: 0

## 2020-08-20 ASSESSMENT — MIFFLIN-ST. JEOR: SCORE: 2114.47

## 2020-08-20 NOTE — PATIENT INSTRUCTIONS
*  bilateral hand osteoarthritis     --Anti inflammatory medications:  Celebrex 200 mg twice per day for the first month.  possibly go down to one per day    *  Contact me in one month with how the medication is helping.  If it si not helping, I will send you to e joint specialist (rheumatologist)    *  Blood pressure needs better control     --Add HCTZ 25 mg once per day in the morning (this is a diurtiec to help reduce the fludi in the system)    *  Continue all other medications at the same doses.  Contact your usual pharmacy if you need refills.     *  I suspect that you may have some underlying obstructive sleep apnea that is contributing to your hypertension and weight.   I would strongly recommend a visit with the [facility] ([phone]) to discuss testing for obstructive sleep apnea     *  Screening labs today for hemochromatosis (a disorder that places too much iron into your blood stream)  If the iron levels are elevated, then further testing required.  If the iron levels are normal, then you do not have it, or it does not require treatment.     Colon Cancer Screening:  ============================================================  *  All men and women are recommended to have some sort of formal colon cancer screening starting at age of 50 (or earlier if indicated based on family history of colon cancer.     *  Colon cancer is a preventable type of cancer that if caught early can be easily treated even before it becomes cancer by removing the precancerous.      *  Common Colon cancer screening options:     --Colonoscopy (every 10 years if normal exam, no family history of colon cancer)  I place order and you will be contacted to arrange this procedure.   Pros: most complete and thorough exam, identify and remove any pre-cancerous polyps.   Cons: prep before procedure, sedation required, possible cost     --ColoGuard Stool test every 3 years (be sure to confirm coverage by  "insurance).  This test checks for colon cancer specific DNA in the stool.  You will receive the collection kit in the mail.  Return the samples via mail.  If positive, you do not necessarily have colon cancer, but will need a colonoscopy.  Most insurance cover this test, but please check with your insurance to confirm this.    Go their web site for more information:  [email or web address]  Pros: easy to collect and return, decent specific screening test, newer test  Cons: cost (if not covered by insurance)     --\"FIT\" Stool test once per year.    Return the \"FIT\" stool card test to us via mail.  This is a basic level screening for colon cancer by detecting trace amount of occult blood in the intestinal tract.  If the FIT test shows any traces of occult blood, it does NOT necessarily mean that you have colon cancer, it just means that we should probably consider doing the colonoscopy.   Pros: easy to collect, cheap  Cons: not very specific, high false positive rate                Preventive Health Recommendations  Male Ages 50 - 64    Yearly exam:             See your health care provider every year in order to  o   Review health changes.   o   Discuss preventive care.    o   Review your medicines if your doctor has prescribed any.     Have a cholesterol test every 5 years, or more frequently if you are at risk for high cholesterol/heart disease.     Have a diabetes test (fasting glucose) every three years. If you are at risk for diabetes, you should have this test more often.     Have a colonoscopy at age 50, or have a yearly FIT test (stool test). These exams will check for colon cancer.      Talk with your health care provider about whether or not a prostate cancer screening test (PSA) is right for you.    You should be tested each year for STDs (sexually transmitted diseases), if you re at risk.     Shots: Get a flu shot each year. Get a tetanus shot every 10 years.     Nutrition:    Eat at least 5 " "servings of fruits and vegetables daily.     Eat whole-grain bread, whole-wheat pasta and brown rice instead of white grains and rice.     Get adequate Calcium and Vitamin D.        --Good Grains:  Oats, brown rice, Quinoa (these do not raise the blood sugar as much)     --Bad grains:  Anything made from wheat or white rice     (because these raise the blood sugars significantly, and the possible gluten issue from wheat for some people).      --Proteins:  Aim for \"lean proteins\" including chicken, fish, seafood, pork, turkey, and eggs (in moderation); Eat red meat only occasionally      Lifestyle    Exercise for at least 150 minutes a week (30 minutes a day, 5 days a week). This will help you control your weight and prevent disease.     Limit alcohol to one drink per day.     No smoking.     Wear sunscreen to prevent skin cancer.     See your dentist every six months for an exam and cleaning.     See your eye doctor every 1 to 2 years.    "

## 2020-08-20 NOTE — PROGRESS NOTES
3  SUBJECTIVE:   CC: Ab Aguilar is an 60 year old male who presents for preventive health visit.     Healthy Habits:    Do you get at least three servings of calcium containing foods daily (dairy, green leafy vegetables, etc.)? yes    Amount of exercise or daily activities, outside of work: none    Problems taking medications regularly No    Medication side effects: No    Have you had an eye exam in the past two years? no    Do you see a dentist twice per year? yes    Do you have sleep apnea, excessive snoring or daytime drowsiness?no      1.    Hypertension:  Blood presure remains well controlled at home  Readings outside clinic are within normal limits.  Reviewed last 6 BP readings in chart:  BP Readings from Last 6 Encounters:   08/20/20 (!) 150/96   10/28/19 (!) 194/116   10/28/19 (!) 152/100   10/26/19 (!) 156/90   09/04/19 138/89   03/16/19 133/87     He has not experienced any significant side effects from medicaiotns for hypertension.    NO active cardiac complaints or symptoms with exercise.     2.  Hyperlipidemia:  Has history of hyperlipidemia.    The patient is taking a medication for this.  Denies any significant side effects from his medication.      Latest labs reviewed:    Recent Labs   Lab Test 08/18/20  0812 03/05/19  1115  10/30/14  0751 09/23/13  0821   CHOL 197 188   < > 220* 237*   HDL 36* 42   < > 43 47   * 103*   < > 139* 129   TRIG 193* 216*   < > 192* 303*   CHOLHDLRATIO  --   --   --  5.1* 5.1*    < > = values in this interval not displayed.        Lab Results   Component Value Date    AST 84 08/18/2020       3.  Wants to be tests for hemochromatosis, his brother was just diagnosed with this and his [facility] doctor revcommended that the siblings (especially men) are screened.     4.  Reports restless sleep, reports onogin daytime somnolence, does not wake up refreshed from a night's sleep.  More restless sleep in general than before.  Reports snoring.     5.    The patient  "has had a history of ongoing obesity.  Reviewed the weigth curves.   Their current BMI is:  Body mass index is 39.12 kg/m .  Reviewed previous attempts at weight loss which have not been successful in producing prolonged weigth loss.   Discussed current eating and exercise habits.     Reviewed weights in chart:  Wt Readings from Last 10 Encounters:   08/20/20 127.2 kg (280 lb 8 oz)   10/28/19 127 kg (280 lb)   10/28/19 127.9 kg (281 lb 14.4 oz)   10/26/19 127 kg (280 lb)   09/04/19 125.2 kg (276 lb)   03/14/19 117.9 kg (260 lb)   03/05/19 119.7 kg (264 lb)   03/12/18 121.2 kg (267 lb 3.2 oz)   11/08/17 120.2 kg (264 lb 14.4 oz)   06/28/17 116.1 kg (255 lb 14.4 oz)      6.  The patient has a history of impaired glucose tolerance with regularly elevated blood sugars.    They have not been diagnosed with type II DM or placed on medications for diabetes before.   They deny polyuria, polydipsia.     The patient is obese with a BMI of Body mass index is 39.12 kg/m ..    Review of current labs show:    Lab Results   Component Value Date    A1C 6.3 08/18/2020    A1C 6.0 08/30/2019    A1C 6.0 03/05/2019    A1C 5.9 10/30/2014    A1C 5.7 09/23/2013    A1C 6.1 09/26/2012    A1C 6.2 09/28/2009    A1C 5.9 04/08/2008    A1C 5.3 09/27/2007     7.  LFTs mildly elevated/   reivewed all prior LFTs with the aptient.   He admistt aht he has been drinking alcohol more regularly, but not really to excess.    (\"couple of beers per night\")    8.  complains of worsening bilateral hand osetoarhtirit.   The MCP, PIP< and DIP joints are painful and stiff in the morning, buetter as dya goes on.   No fevers, no chills.   Tylenol does not help much.   No family history of rheumatoid arthritis     Today's PHQ-2 Score:   PHQ-2 ( 1999 Pf[employer]) 8/20/2020 10/28/2019   Q1: Little interest or pleasure in doing things 0 0   Q2: Feeling down, depressed or hopeless 0 0   PHQ-2 Score 0 0   Q1: Little interest or pleasure in doing things - -   Q2: Feeling " down, depressed or hopeless - -   PHQ-2 Score - -       Abuse: Current or Past(Physical, Sexual or Emotional)- No  Do you feel safe in your environment? Yes        Social History     Tobacco Use     Smoking status: Never Smoker     Smokeless tobacco: Never Used   Substance Use Topics     Alcohol use: Yes     Comment: 10-12 beers/week     If you drink alcohol do you typically have >3 drinks per day or >7 drinks per week? Yes - AUDIT SCORE:  8  AUDIT - Alcohol Use Disorders Identification Test - Reproduced from the World Health Organization Audit 2001 (Second Edition) 8/20/2020   1.  How often do you have a drink containing alcohol? 2 to 3 times a week   2.  How many drinks containing alcohol do you have on a typical day when you are drinking? 5 or 6   3.  How often do you have five or more drinks on one occasion? Weekly   4.  How often during the last year have you found that you were not able to stop drinking once you had started? Never   5.  How often during the last year have you failed to do what was normally expected of you because of drinking? Never   6.  How often during the last year have you needed a first drink in the morning to get yourself going after a heavy drinking session? Never   7.  How often during the last year have you had a feeling of guilt or remorse after drinking? Never   8.  How often during the last year have you been unable to remember what happened the night before because of your drinking? Never   9.  Have you or someone else been injured because of your drinking? No   10. Has a relative, friend, doctor or other health care worker been concerned about your drinking or suggested you cut down? No   TOTAL SCORE 8                         Last PSA:   PSA   Date Value Ref Range Status   08/18/2020 0.49 0 - 4 ug/L Final     Comment:     Assay Method:  Chemiluminescence using Siemens Vista analyzer       Reviewed orders with patient. Reviewed health maintenance and updated orders accordingly -  Yes      Reviewed and updated as needed this visit by clinical staff  Tobacco  Allergies  Meds         Reviewed and updated as needed this visit by Provider          Past Medical History:  ---------------------------  Past Medical History:   Diagnosis Date     Esophageal reflux      Essential hypertension, benign      Major depression      Obesity      Obesity (BMI 35.0-35.9 with comorbidity) (H) 2012     Other and unspecified hyperlipidemia      Other chronic pain     lt knee     Prediabetes 9/26/12    A1C 6.1       Past Surgical History:  ---------------------------  Past Surgical History:   Procedure Laterality Date     ARTHROPLASTY KNEE Left 3/14/2019    Procedure: Left total knee arthroplasty;  Surgeon: Zulema Lacey MD;  Location: [facility] OR     C NONSPECIFIC PROCEDURE  1991    R ankle fracture, repair     C NONSPECIFIC PROCEDURE  age 6    B inguinal hernia repair     C NONSPECIFIC PROCEDURE  9/01    lumbar laminectomy     ORTHOPEDIC SURGERY      rt knee replacement 2017, rt rotator cuff repair 6 years ago     STRESS ECHO (METRO)  10/02    normal      STRESS ECHO (METRO)  9/05    normal stress echo       Current Medications:  ---------------------------  Current Outpatient Medications   Medication Sig Dispense Refill     aspirin (ASA) 81 MG EC tablet Take 1 tablet (81 mg) by mouth daily       ibuprofen (ADVIL,MOTRIN) 200 MG tablet Take 800 mg by mouth 3 times daily as needed.       lisinopril (ZESTRIL) 40 MG tablet TAKE 1 TABLET BY MOUTH EVERY DAY 90 tablet 0     metoprolol succinate ER (TOPROL-XL) 25 MG 24 hr tablet TAKE 1 TABLET (25 MG) BY MOUTH DAILY LAST REFILL WITHOUT AN APPOINTMENT 90 tablet 0     omeprazole (PRILOSEC) 20 MG DR capsule Take 2 capsules (40 mg) by mouth daily       pravastatin (PRAVACHOL) 40 MG tablet TAKE 1 TABLET BY MOUTH EVERY DAY 90 tablet 1     sucralfate (CARAFATE) 1 GM tablet TAKE 1 TABLET(1 GRAM) BY MOUTH FOUR TIMES DAILY AS NEEDED 360 tablet 2     zolpidem (AMBIEN) 10 MG tablet  TAKE 0.5-1 TABLETS (5-10 MG) BY MOUTH NIGHTLY AS NEEDED FOR SLEEP 30 tablet 3     fluticasone (FLONASE) 50 MCG/ACT nasal spray INSTILL 2 SPRAYS INTO BOTH NOSTRILS DAILY 16 mL 0     neomycin-polymyxin-hydrocortisone (CORTISPORIN) 3.5-92527-7 otic solution Place 3 drops in ear(s) 4 times daily 10 mL 0       Allergies:  -------------  Allergies   Allergen Reactions     Atorvastatin GI Disturbance     Abdominal and intestinal pains from atorvastatin       Social History:  -------------------  Social History     Socioeconomic History     Marital status:      Spouse name: Not on file     Number of children: Not on file     Years of education: Not on file     Highest education level: Not on file   Occupational History     Not on file   Social Needs     Financial resource strain: Not on file     Food insecurity     Worry: Not on file     Inability: Not on file     Transportation needs     Medical: Not on file     Non-medical: Not on file   Tobacco Use     Smoking status: Never Smoker     Smokeless tobacco: Never Used   Substance and Sexual Activity     Alcohol use: Yes     Comment: 10-12 beers/week     Drug use: No     Sexual activity: Yes     Partners: Female   Lifestyle     Physical activity     Days per week: Not on file     Minutes per session: Not on file     Stress: Not on file   Relationships     Social connections     Talks on phone: Not on file     Gets together: Not on file     Attends Mosque service: Not on file     Active member of club or organization: Not on file     Attends meetings of clubs or organizations: Not on file     Relationship status: Not on file     Intimate partner violence     Fear of current or ex partner: Not on file     Emotionally abused: Not on file     Physically abused: Not on file     Forced sexual activity: Not on file   Other Topics Concern      Service Not Asked     Blood Transfusions Not Asked     Caffeine Concern No     Occupational Exposure Not Asked     Hobby  "Hazards Not Asked     Sleep Concern Not Asked     Stress Concern Not Asked     Weight Concern Not Asked     Special Diet Not Asked     Back Care Not Asked     Exercise Not Asked     Bike Helmet Not Asked     Seat Belt Not Asked     Self-Exams Not Asked     Parent/sibling w/ CABG, MI or angioplasty before 65F 55M? Not Asked   Social History Narrative     Not on file       Family Medical History:  ------------------------------  Family History   Problem Relation Age of Onset     Hypertension Father      Heart Disease Brother 37        b. 1967, 1st MI at age 37     Family History Negative Mother      Family History Negative Brother         b. 1965     Family History Negative Sister         b. 1961        ROS:  CONSTITUTIONAL: NEGATIVE for fever, chills, change in weight  INTEGUMENTARY/SKIN: NEGATIVE for worrisome rashes, moles or lesions  EYES: NEGATIVE for vision changes or irritation  ENT: NEGATIVE for ear, mouth and throat problems  RESP: NEGATIVE for significant cough or SOB  CV: NEGATIVE for chest pain, palpitations or peripheral edema  GI: NEGATIVE for nausea, abdominal pain, heartburn, or change in bowel habits   male: negative for dysuria, hematuria, decreased urinary stream, erectile dysfunction, urethral discharge  MUSCULOSKELETAL: NEGATIVE for significant arthralgias or myalgia  NEURO: NEGATIVE for weakness, dizziness or paresthesias  PSYCHIATRIC: NEGATIVE for changes in mood or affect    OBJECTIVE:   BP (!) 150/96   Pulse 78   Temp 98  F (36.7  C) (Temporal)   Ht 1.803 m (5' 11\")   Wt 127.2 kg (280 lb 8 oz)   SpO2 98%   BMI 39.12 kg/m    EXAM:  GENERAL alert and no distress  EYES:  Normal sclera,conjunctiva, EOMI  HENT: oral and posterior pharynx without lesions or erythema, facies symmetric  NECK: Neck supple. No LAD, without thyroidmegaly.  RESP: Clear to ausculation bilaterally without wheezes or crackles. Normal BS in all fields.  CV: RRR normal S1S2 without murmurs, rubs or gallops.  LYMPH: no " cervical lymph adenopathy appreciated  MS: extremities- no gross deformities of the visible extremities noted,   EXT:  no lower extremity edema  PSYCH: Alert and oriented times 3; speech- coherent  SKIN:  No obvious significant skin lesions on visible portions of face         ASSESSMENT/PLAN:     (Z00.00) Routine general medical examination at a health care facility  (primary encounter diagnosis)  Comment: Discussed cardiac disease risk factor modification including screening for and treating HTN, lipids, DM, and smoking cessation.  Also discussed age appropriate cancer screening recommendations including testicular, prostate, colon and lung cancer as dictated by age group.  Recommended low fat, low salt diet and moderation in any alcohol intake.  Recommended always using seatbelts when in a car.  Recommended never driving after drinking or riding with someone who has been drinking as well.       Plan:     (Z83.49) Family history of hemochromatosis  Comment: Reviewed the basics of hemochromatosis.  Will perform basic screening with iron and ferritin levels.  Will defer on the genetic evaluation tests unless the screening tests are positive.  Plan: Iron and iron binding capacity, Ferritin            (E78.5) Hyperlipidemia LDL goal <130  Comment: This condition is currently controlled on the current medical regimen.  Continue current therapy.   Discussed current lipid results, previous results (if available) current guidelines (NCEP) for treatment and goals for lipids.  Discussed lifestyle modification, dietary changes (low fat, low simple carb) and regular aerobic exercise.  Discussed the link between dysmetabolic syndrome and impaired glucose tolerance seen in certain patterns of lipids.  Briefly discussed medication used for lipid lowering, including the statins are their possible side effects of myalgias, rhabdomyolysis, and liver toxicity.   Plan: pravastatin (PRAVACHOL) 40 MG tablet, Lipid         panel reflex  "to direct LDL Fasting, CBC with         platelets, Comprehensive metabolic panel            (R73.03) Prediabetes  Comment: Reviewed the labs showing elevated glucose levels.    Discussed \"pre-diabetes\", impaired glucose tolerance, and its part in the dysmetabolic syndrome.    Discussed the inevitable progression of impaired glucose tolerance toward worsening diabetes mellitus and the need for agressive interventions now to delay and prevent this inevitable progression.  Discussed the overall risks that dysmetabolic syndromes/impaired glucose tolerance/syndrome X pose toward increased risks of vascular disease as the main reason for agressive intervention now.  Will add medications for glucose control (i.e. metformin, glitazones, etc), lipid (e.g. statins), and for blood pressure (preferably ARBs and ACE) as indicated.  Will start these as early as needed based other proven ability to delay and modify these risk factors.   Discussed the need for aggressive diet control as the cornerstone of pre-diabetes and diabetes management, emphasizing the reduction of \"simple carbohydrates\" (e.g. Any kind of wheat products (e.g. any bread, any pasta), white rice, noodles, potatoes, snack foods, regular soda, juices (except fresh squeezed), cakes, cookies, candy, etc.) along with regular exercise.       Plan: pravastatin (PRAVACHOL) 40 MG tablet,         lisinopril (ZESTRIL) 40 MG tablet, Lipid panel         reflex to direct LDL Fasting, CBC with         platelets, Comprehensive metabolic panel            (E66.01,  Z68.35) Severe obesity (BMI 35.0-35.9 with comorbidity) (H)  Comment: The patient's current BMI is: Body mass index is 39.12 kg/m ..  Reviewed their weight patterns.   Discussed obesity as a biological preventable and treatable disease, which is associated with significantly increased risk of many acute and chronic health conditions. Obesity has now been recognized as a chronic disease by the American Medical " Association.  Discussed serious co-morbidities associated with obesity including increased risk for hypertension, stroke, coronary artery disease, dyslipidemia, Type II diabetes, depression, sleep apnea, cancers of the colon, breast and endometrium, obstructive sleep apnea, osteoarthritis and female infertility.  Recommended regular aerobic exercise, recommended improved diet aiming at lowering amount of processed foods, lower sugars, and lower wheat products, and moderation carbs and fat in the diet, establishing more regular meal times, always eating breakfast, front loading some of the calories and adding more protein to the diet.     Briefly discussed bariatric surgery as a consideration, especially in patients with BMI greater than or equal to 35 kg/m2 with multiple complications.     Plan: Lipid panel reflex to direct LDL Fasting, CBC         with platelets, Comprehensive metabolic panel            (I10) Essential hypertension, benign  Comment: BP nbormally much better at home.   I suspect that he has some underlying obstructive sleep apnea that has yet to be diangosed.  Add diruetic for now.   Low sodium diet recommended.   Referral to sleep clinic.   Plan: lisinopril (ZESTRIL) 40 MG tablet, metoprolol         succinate ER (TOPROL-XL) 25 MG 24 hr tablet,         hydrochlorothiazide (HYDRODIURIL) 25 MG tablet,        Lipid panel reflex to direct LDL Fasting, CBC         with platelets, Comprehensive metabolic panel            (K21.9) Gastroesophageal reflux disease without esophagitis  Comment: This condition is currently controlled on the current medical regimen.  Continue current therapy.   Plan: omeprazole (PRILOSEC) 40 MG DR capsule            (R06.83) [place]  Comment:   Plan: SLEEP EVALUATION & MANAGEMENT REFERRAL - [facility]         [phone]  (Age 18 and up)            (R40.0) Daytime somnolence  Comment: strongly suspect obstructive sleep apnea.   Strongly  "recommended referral to sleep clinic to ndier sleep study  Plan: SLEEP EVALUATION & MANAGEMENT REFERRAL - ADULT         -[facility]         [phone]  (Age 18 and up)            (M19.041,  M19.042) Primary osteoarthritis of both hands  Comment: I would recommend against traditional NSAIDs due to gastric side effects and alcohol use.   triual fo celebrex  Referral to Rheumatologist if no better and sx persist to significant degree.   Plan: celecoxib (CELEBREX) 200 MG capsule            (R94.5) Abnormal LFTs  Comment: he has been drinking more lately.   Discussed how alcohol affects the liver.   This could also be from Nonalcoholic Steatorrheic Hepatitis due to his weight/obesity.   Plan: Lipid panel reflex to direct LDL Fasting, CBC         with platelets, Comprehensive metabolic panel               COUNSELING:  Reviewed preventive health counseling, as reflected in patient instructions       Regular exercise       Healthy diet/nutrition       Vision screening       Hearing screening    Estimated body mass index is 39.12 kg/m  as calculated from the following:    Height as of this encounter: 1.803 m (5' 11\").    Weight as of this encounter: 127.2 kg (280 lb 8 oz).         reports that he has never smoked. He has never used smokeless tobacco.      Counseling Resources:  ATP IV Guidelines  Pooled Cohorts Equation Calculator  FRAX Risk Assessment  ICSI Preventive Guidelines  Dietary Guidelines for Americans, 2010  USDA's MyPlate  ASA Prophylaxis  Lung CA Screening    Shayne Suggs MD  [facility]  "

## 2020-08-21 ENCOUNTER — MYC MEDICAL ADVICE (OUTPATIENT)
Dept: INTERNAL MEDICINE | Facility: CLINIC | Age: 60
End: 2020-08-21

## 2020-08-24 NOTE — TELEPHONE ENCOUNTER
The iron level and ferritin level are within normal limits, this means no hemochromatosis at this time.   The iron binding capacity only means something when you are actually have a low blood count (anemia), which is not the case for you.  This lab is not relevent for you.  The more important labs for y ou are the iron and ferritin levels.

## 2020-08-27 DIAGNOSIS — K21.00 GASTROESOPHAGEAL REFLUX DISEASE WITH ESOPHAGITIS: ICD-10-CM

## 2020-08-27 RX ORDER — SUCRALFATE 1 G/1
TABLET ORAL
Qty: 360 TABLET | Refills: 3 | Status: SHIPPED | OUTPATIENT
Start: 2020-08-27 | End: 2021-11-02

## 2020-08-28 DIAGNOSIS — F51.01 PRIMARY INSOMNIA: ICD-10-CM

## 2020-08-28 RX ORDER — ZOLPIDEM TARTRATE 10 MG/1
5-10 TABLET ORAL
Qty: 30 TABLET | Refills: 3 | Status: SHIPPED | OUTPATIENT
Start: 2020-08-28 | End: 2020-12-17

## 2020-10-11 DIAGNOSIS — M19.041 PRIMARY OSTEOARTHRITIS OF BOTH HANDS: ICD-10-CM

## 2020-10-11 DIAGNOSIS — M19.042 PRIMARY OSTEOARTHRITIS OF BOTH HANDS: ICD-10-CM

## 2020-10-13 RX ORDER — CELECOXIB 200 MG/1
CAPSULE ORAL
Qty: 60 CAPSULE | Refills: 1 | Status: SHIPPED | OUTPATIENT
Start: 2020-10-13 | End: 2020-12-17

## 2020-10-13 NOTE — TELEPHONE ENCOUNTER
Celecoxib    Routing refill request to provider for review/approval because:  Labs not current:  Hepatic panel  BP not at goal    BP Readings from Last 3 Encounters:   08/20/20 (!) 144/92   10/28/19 (!) 194/116   10/28/19 (!) 152/100

## 2020-12-04 ENCOUNTER — VIRTUAL VISIT (OUTPATIENT)
Dept: INTERNAL MEDICINE | Facility: CLINIC | Age: 60
End: 2020-12-04
Payer: COMMERCIAL

## 2020-12-04 DIAGNOSIS — I10 ESSENTIAL HYPERTENSION, BENIGN: ICD-10-CM

## 2020-12-04 DIAGNOSIS — R73.03 PREDIABETES: ICD-10-CM

## 2020-12-04 DIAGNOSIS — Z02.9 ENCOUNTER FOR ADMINISTRATIVE EXAMINATIONS: ICD-10-CM

## 2020-12-04 DIAGNOSIS — F51.01 PRIMARY INSOMNIA: ICD-10-CM

## 2020-12-04 DIAGNOSIS — Z65.9 OTHER SOCIAL STRESSOR: Primary | ICD-10-CM

## 2020-12-04 PROCEDURE — 99214 OFFICE O/P EST MOD 30 MIN: CPT | Mod: GT | Performed by: INTERNAL MEDICINE

## 2020-12-04 NOTE — PROGRESS NOTES
"Ab Aguilar is a 60 year old male who is being evaluated via a billable video visit.      The patient has been notified of following:     \"This video visit will be conducted via a call between you and your physician/provider. We have found that certain health care needs can be provided without the need for an in-person physical exam.  This service lets us provide the care you need with a video conversation.  If a prescription is necessary we can send it directly to your pharmacy.  If lab work is needed we can place an order for that and you can then stop by our lab to have the test done at a later time.    Video visits are billed at different rates depending on your insurance coverage.  Please reach out to your insurance provider with any questions.    If during the course of the call the physician/provider feels a video visit is not appropriate, you will not be charged for this service.\"    Patient has given verbal consent for Video visit? Yes  How would you like to obtain your AVS? MyChart  If you are dropped from the video visit, the video invite should be resent to: MyChart  Will anyone else be joining your video visit? No    Subjective     Ab Aguilar is a 60 year old male who presents today via video visit for the following health issues:    HPI     Chief Complaint   Patient presents with     Forms     patient requesting FMLA forms be filled out for work due to wife being sick and in the hospital        2.  Taking zolpidem for insomnia, working fine.    3.    Hypertension:  History of hypertension, on medication.  No reported side effects from medications.    Reviewed last 6 BP readings in chart:  BP Readings from Last 6 Encounters:   08/20/20 (!) 144/92   10/28/19 (!) 194/116   10/28/19 (!) 152/100   10/26/19 (!) 156/90   09/04/19 138/89   03/16/19 133/87     No active cardiac complaints or symptoms with exercise.     4. The patient has a history of impaired glucose tolerance with regularly " elevated blood sugars.    They have not been diagnosed with type II DM or placed on medications for diabetes before.   They deny polyuria, polydipsia.     The patient is obese with Body mass index is 39.12 kg/m  at last appointment in August 2020.    Review of current labs show:    Lab Results   Component Value Date    A1C 6.3 08/18/2020    A1C 6.0 08/30/2019    A1C 6.0 03/05/2019    A1C 5.9 10/30/2014    A1C 5.7 09/23/2013    A1C 6.1 09/26/2012    A1C 6.2 09/28/2009    A1C 5.9 04/08/2008    A1C 5.3 09/27/2007                **I reviewed the information recorded in the patient's EPIC chart (including but not limited to medical history, surgical history, family history, problem list, medication list, and allergy list) and updated the information as indicated based on the patients reported information.         Review of Systems   Constitutional, HEENT, cardiovascular, pulmonary, gi and gu systems are negative, except as otherwise noted.      Objective           Vitals:  No vitals were obtained today due to virtual visit.    Physical Exam     GENERAL: Healthy, alert and no distress  EYES: Eyes grossly normal to inspection.  No discharge or erythema, or obvious scleral/conjunctival abnormalities.  RESP: No audible wheeze, cough, or visible cyanosis.  No visible retractions or increased work of breathing.    SKIN: Visible skin clear. No significant rash, abnormal pigmentation or lesions.  NEURO: Cranial nerves grossly intact.  Mentation and speech appropriate for age.  PSYCH: Mentation appears normal, affect normal/bright, judgement and insight intact, normal speech and appearance well-groomed.              (Z65.9) Other social stressor  (primary encounter diagnosis)  Comment: He needs FMLA form filled out due to multiple absences from work due to his duties and requiring care for his wife.  His wife has numerous chronic medical problems, and requires numerous visits to many specialists including neurology, pulmonology,  psychiatry, pain specialty, gastroenterology.  She is currently admitted to the hospital just this morning with acute mental status changes and is currently in the intensive care unit.  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  Hard for me to know exactly how long these conditions will last, he has been missing multiple shifts at work since November 23.  For now we will anticipate that he may need to miss more work over the next 3 months while she recovers from these numerous medical problems.  I fill out FMLA form accordingly.  Plan:     (F51.01) Primary insomnia  Comment: Stable on zolpidem.  Plan:     (I10) Essential hypertension, benign  Comment: Stable on current medications.  Continue to monitor.  Plan:     (R73.03) Prediabetes  Comment: Have been watching this closely, continue to recommend lower carbohydrate diet.  Plan:     (Z02.9) Encounter for administrative examinations  Comment: FMLA form filled out for the patient, copy made for the chart.  Plan:          Video-Visit Details    Type of service:  Video Visit      Video Start Time: 1:30 PM    Video End Time:  1:45 PM    Originating Location (pt. Location): Home    Distant Location (provider location):  [facility]     Platform used for Video Visit: Christophe

## 2020-12-16 DIAGNOSIS — I10 ESSENTIAL HYPERTENSION, BENIGN: ICD-10-CM

## 2020-12-16 DIAGNOSIS — R73.03 PREDIABETES: ICD-10-CM

## 2020-12-16 DIAGNOSIS — M19.041 PRIMARY OSTEOARTHRITIS OF BOTH HANDS: ICD-10-CM

## 2020-12-16 DIAGNOSIS — F51.01 PRIMARY INSOMNIA: ICD-10-CM

## 2020-12-16 DIAGNOSIS — M19.042 PRIMARY OSTEOARTHRITIS OF BOTH HANDS: ICD-10-CM

## 2020-12-16 NOTE — TELEPHONE ENCOUNTER
Hydrochlorothiazide, Lisinopril, Celecoxib    Routing refill request to provider for review/approval because:  BP not at goal  Labs out of range: hepatic panel    BP Readings from Last 3 Encounters:   08/20/20 (!) 144/92   10/28/19 (!) 194/116   10/28/19 (!) 152/100       Zolpidem      Last Written Prescription Date:  8/28/2020  Last Fill Quantity: 30,   # refills: 3  Last Office Visit: 12-4-2020 (Virtual visit)  Future Office visit:       Routing refill request to provider for review/approval because:  Drug not on the [facility], [facility] or [facility] refill protocol or controlled substance    Donna DHILLONN, RN, PHN

## 2020-12-17 RX ORDER — HYDROCHLOROTHIAZIDE 25 MG/1
TABLET ORAL
Qty: 90 TABLET | Refills: 1 | Status: SHIPPED | OUTPATIENT
Start: 2020-12-17 | End: 2021-04-12

## 2020-12-17 RX ORDER — LISINOPRIL 40 MG/1
TABLET ORAL
Qty: 90 TABLET | Refills: 1 | Status: SHIPPED | OUTPATIENT
Start: 2020-12-17 | End: 2021-04-12

## 2020-12-17 RX ORDER — CELECOXIB 200 MG/1
CAPSULE ORAL
Qty: 60 CAPSULE | Refills: 1 | Status: SHIPPED | OUTPATIENT
Start: 2020-12-17 | End: 2021-02-09

## 2020-12-17 RX ORDER — ZOLPIDEM TARTRATE 10 MG/1
5-10 TABLET ORAL
Qty: 30 TABLET | Refills: 3 | Status: SHIPPED | OUTPATIENT
Start: 2020-12-17 | End: 2021-04-06

## 2021-01-13 DIAGNOSIS — R73.03 PREDIABETES: ICD-10-CM

## 2021-01-13 DIAGNOSIS — E78.5 HYPERLIPIDEMIA LDL GOAL <130: ICD-10-CM

## 2021-01-14 RX ORDER — PRAVASTATIN SODIUM 40 MG
TABLET ORAL
Qty: 90 TABLET | Refills: 1 | Status: SHIPPED | OUTPATIENT
Start: 2021-01-14 | End: 2021-04-12

## 2021-01-15 ENCOUNTER — HEALTH MAINTENANCE LETTER (OUTPATIENT)
Age: 61
End: 2021-01-15

## 2021-02-07 DIAGNOSIS — M19.042 PRIMARY OSTEOARTHRITIS OF BOTH HANDS: ICD-10-CM

## 2021-02-07 DIAGNOSIS — M19.041 PRIMARY OSTEOARTHRITIS OF BOTH HANDS: ICD-10-CM

## 2021-02-07 NOTE — TELEPHONE ENCOUNTER
Failed protocol.  please route to  team if patient needs an appointment     Vicenta DIASRN BSN  [facility]  [phone]

## 2021-02-09 RX ORDER — CELECOXIB 200 MG/1
CAPSULE ORAL
Qty: 60 CAPSULE | Refills: 1 | Status: SHIPPED | OUTPATIENT
Start: 2021-02-09 | End: 2021-04-06

## 2021-04-05 DIAGNOSIS — M19.042 PRIMARY OSTEOARTHRITIS OF BOTH HANDS: ICD-10-CM

## 2021-04-05 DIAGNOSIS — I10 ESSENTIAL HYPERTENSION, BENIGN: ICD-10-CM

## 2021-04-05 DIAGNOSIS — M19.041 PRIMARY OSTEOARTHRITIS OF BOTH HANDS: ICD-10-CM

## 2021-04-05 DIAGNOSIS — F51.01 PRIMARY INSOMNIA: ICD-10-CM

## 2021-04-06 RX ORDER — ZOLPIDEM TARTRATE 10 MG/1
5-10 TABLET ORAL
Qty: 30 TABLET | Refills: 3 | Status: SHIPPED | OUTPATIENT
Start: 2021-04-06 | End: 2021-07-28

## 2021-04-06 RX ORDER — METOPROLOL SUCCINATE 25 MG/1
TABLET, EXTENDED RELEASE ORAL
Qty: 90 TABLET | Refills: 1 | Status: SHIPPED | OUTPATIENT
Start: 2021-04-06 | End: 2021-10-04

## 2021-04-06 RX ORDER — CELECOXIB 200 MG/1
CAPSULE ORAL
Qty: 60 CAPSULE | Refills: 1 | Status: SHIPPED | OUTPATIENT
Start: 2021-04-06 | End: 2021-06-01

## 2021-04-06 NOTE — TELEPHONE ENCOUNTER
Routing refill request to provider for review/approval because:     Blood pressure under 140/90 in past 12 months Protocol Details    Normal ALT on file in past 12 months     Normal AST on file in past 12 months        Controlled Substance Refill Request for: zolpidem (AMBIEN) 10 MG tablet  Problem List Complete:  No     PROVIDER TO CONSIDER COMPLETION OF PROBLEM LIST AND OVERVIEW/CONTROLLED SUBSTANCE AGREEMENT    Last Written Prescription Date: 12/17/20  Last Fill Quantity: 30,   # refills: 3    THE MOST RECENT OFFICE VISIT MUST BE WITHIN THE PAST 3 MONTHS. AT LEAST ONE FACE TO FACE VISIT MUST OCCUR EVERY 6 MONTHS. ADDITIONAL VISITS CAN BE VIRTUAL.  (THIS STATEMENT SHOULD BE DELETED.)    Last Office Visit with [facility] primary care provider: 12/4/20 Virtual with Dr. Suggs     Future Office visit:   Next 5 appointments (look out 90 days)    Apr 12, 2021  2:20 PM  MyChart Short with Shayne Suggs MD  [facility] ([facility] ) [address] [address] [phone]          Controlled substance agreement:   Encounter-Level CSA:    There are no encounter-level csa.     Patient-Level CSA:    There are no patient-level csa.         Last Urine Drug Screen: No results found for: CDAUT, No results found for: COMDAT, No results found for: THC13, PCP13, COC13, MAMP13, OPI13, AMP13, BZO13, TCA13, MTD13, BAR13, OXY13, PPX13, BUP13     Processing:  Rx to be electronically transmitted to pharmacy by provider      [email or web address]     checked in past 3 months? Provider to review.

## 2021-04-12 ENCOUNTER — OFFICE VISIT (OUTPATIENT)
Dept: INTERNAL MEDICINE | Facility: CLINIC | Age: 61
End: 2021-04-12
Payer: COMMERCIAL

## 2021-04-12 VITALS
OXYGEN SATURATION: 98 % | HEART RATE: 91 BPM | SYSTOLIC BLOOD PRESSURE: 160 MMHG | DIASTOLIC BLOOD PRESSURE: 110 MMHG | TEMPERATURE: 98.1 F | BODY MASS INDEX: 41.49 KG/M2 | HEIGHT: 71 IN | WEIGHT: 296.4 LBS

## 2021-04-12 DIAGNOSIS — R73.03 PREDIABETES: ICD-10-CM

## 2021-04-12 DIAGNOSIS — E78.5 HYPERLIPIDEMIA LDL GOAL <130: ICD-10-CM

## 2021-04-12 DIAGNOSIS — M19.042 PRIMARY OSTEOARTHRITIS OF BOTH HANDS: ICD-10-CM

## 2021-04-12 DIAGNOSIS — M19.041 PRIMARY OSTEOARTHRITIS OF BOTH HANDS: ICD-10-CM

## 2021-04-12 DIAGNOSIS — I10 ESSENTIAL HYPERTENSION, BENIGN: Primary | ICD-10-CM

## 2021-04-12 DIAGNOSIS — K21.9 GASTROESOPHAGEAL REFLUX DISEASE WITHOUT ESOPHAGITIS: ICD-10-CM

## 2021-04-12 PROCEDURE — 99213 OFFICE O/P EST LOW 20 MIN: CPT | Performed by: INTERNAL MEDICINE

## 2021-04-12 RX ORDER — HYDROCHLOROTHIAZIDE 25 MG/1
25 TABLET ORAL EVERY MORNING
Qty: 90 TABLET | Refills: 1 | Status: SHIPPED | OUTPATIENT
Start: 2021-04-12 | End: 2021-11-16

## 2021-04-12 RX ORDER — PRAVASTATIN SODIUM 40 MG
40 TABLET ORAL DAILY
Qty: 90 TABLET | Refills: 1 | Status: SHIPPED | OUTPATIENT
Start: 2021-04-12 | End: 2021-11-16

## 2021-04-12 RX ORDER — OMEPRAZOLE 40 MG/1
40 CAPSULE, DELAYED RELEASE ORAL DAILY
Qty: 90 CAPSULE | Refills: 1 | Status: SHIPPED | OUTPATIENT
Start: 2021-04-12 | End: 2021-11-16

## 2021-04-12 RX ORDER — LISINOPRIL 40 MG/1
40 TABLET ORAL DAILY
Qty: 90 TABLET | Refills: 1 | Status: SHIPPED | OUTPATIENT
Start: 2021-04-12 | End: 2021-11-16

## 2021-04-12 ASSESSMENT — MIFFLIN-ST. JEOR: SCORE: 2186.59

## 2021-04-12 NOTE — PROGRESS NOTES
"    Assessment & Plan     (I10) Essential hypertension, benign  (primary encounter diagnosis)  Comment: Patient has not been taking lisinopril nor hydrochlorothiazide for the past 2 months due to mixup at the pharmacy.    Typical pressure well controlled on this dose of medicine.    Resume lisinopril, resume hydrochlorothiazide at prior doses.    Continue metoprolol at same dose.    Return to the [facility] pharmacy for blood pressure check in approximately 1 month, titrate meds appropriately.  Would next increase metoprolol if needed.  Plan: lisinopril (ZESTRIL) 40 MG tablet,         hydrochlorothiazide (HYDRODIURIL) 25 MG tablet            (R73.03) Prediabetes  Comment: Reviewed the labs showing mildly elevated glucose levels consistent with prediabetes.     Discussed \"pre-diabetes\", impaired glucose tolerance, and its part in the dysmetabolic syndrome.   No medications needed at this time.     Discussed the inevitable progression of impaired glucose tolerance toward worsening diabetes mellitus if nothing is changed in the diet, and the need for agressive interventions now to delay and prevent this inevitable progression.    Recommended lower carb diet.  Recommended regular physical activity.   We will continue to monitor this and [place] additional recommendations and treatments as indicated based on the labs.       Plan: lisinopril (ZESTRIL) 40 MG tablet, pravastatin         (PRAVACHOL) 40 MG tablet            (E78.5) Hyperlipidemia LDL goal <130  Comment: Discussed guidelines recommending a statin cholesterol lowering medication for any patient with either diabetes and/or vascular disease, aiming for a LDL goal under 100 for sure, ideally under 70, using whatever dose of statin tolerated.    Plan: pravastatin (PRAVACHOL) 40 MG tablet            (M19.041,  M19.042) Primary osteoarthritis of both hands  Comment: This condition is currently controlled on the current medical regimen.  Continue current therapy. " "  Plan:     (K21.9) Gastroesophageal reflux disease without esophagitis  Comment: This condition is currently controlled on the current medical regimen.  Continue current therapy.   Plan: omeprazole (PRILOSEC) 40 MG DR capsule                        BMI:   Estimated body mass index is 41.34 kg/m  as calculated from the following:    Height as of this encounter: 1.803 m (5' 11\").    Weight as of this encounter: 134.4 kg (296 lb 6.4 oz).           Return in about 6 months (around 10/12/2021) for Blood pressure, Annual physical.    Shayne Suggs MD  [facility]    Duane Berger is a 60 year old who presents for the following health issues     HPI     Hypertension Follow-up      Do you check your blood pressure regularly outside of the clinic? No     Are you following a low salt diet? Yes    Are your blood pressures ever more than 140 on the top number (systolic) OR more   than 90 on the bottom number (diastolic), for example 140/90? n/a    **The patient is not been taking his lisinopril nor hydrochlorothiazide for the past 2 months due to a mixup at the pharmacy.  Prescription refills were sent in December for 3 months with 1 additional refill which should last him well through the summer.  However patient states that he was told by the pharmacy that refills are not available (even though these prescriptions were sent and acknowledged to have been received by the pharmacy)    2.    The patient has had a history of ongoing obesity.  Reviewed the weigth curves.   Their current BMI is:  Body mass index is 41.34 kg/m .  Reviewed previous attempts at weight loss which have not been successful in producing prolonged weigth loss.   Discussed current eating and exercise habits.     Reviewed weights in chart:  Wt Readings from Last 10 Encounters:   04/12/21 134.4 kg (296 lb 6.4 oz)   08/20/20 127.2 kg (280 lb 8 oz)   10/28/19 127 kg (280 lb)   10/28/19 127.9 kg (281 lb 14.4 oz) " "  10/26/19 127 kg (280 lb)   09/04/19 125.2 kg (276 lb)   03/14/19 117.9 kg (260 lb)   03/05/19 119.7 kg (264 lb)   03/12/18 121.2 kg (267 lb 3.2 oz)   11/08/17 120.2 kg (264 lb 14.4 oz)        3.  Insomsnia, takes zolpidem on a semiregular basis.  Sleep has been difficult especially since the death of his wife 2 months ago.  Denies side effects of this medicine.    4.  The patient has a history of impaired glucose tolerance with regularly elevated blood sugars.    They have not been diagnosed with type II DM or placed on medications for diabetes before.   They deny polyuria, polydipsia.     The patient is obese with a BMI of Body mass index is 41.34 kg/m ..    Review of current labs show:    Lab Results   Component Value Date    A1C 6.3 08/18/2020    A1C 6.0 08/30/2019    A1C 6.0 03/05/2019    A1C 5.9 10/30/2014    A1C 5.7 09/23/2013    A1C 6.1 09/26/2012    A1C 6.2 09/28/2009    A1C 5.9 04/08/2008    A1C 5.3 09/27/2007     @bgl@       Review of Systems   Constitutional, HEENT, cardiovascular, pulmonary, gi and gu systems are negative, except as otherwise noted.      Objective    BP (!) 160/110   Pulse 91   Temp 98.1  F (36.7  C) (Temporal)   Ht 1.803 m (5' 11\")   Wt 134.4 kg (296 lb 6.4 oz)   SpO2 98%   BMI 41.34 kg/m    Body mass index is 41.34 kg/m .  Physical Exam   GENERAL alert and no distress  EYES:  Normal sclera,conjunctiva, EOMI  HENT: oral and posterior pharynx without lesions or erythema, facies symmetric  NECK: Neck supple. No LAD, without thyroidmegaly.  RESP: Clear to ausculation bilaterally without wheezes or crackles. Normal BS in all fields.  CV: RRR normal S1S2 without murmurs, rubs or gallops.  LYMPH: no cervical lymph adenopathy appreciated  MS: extremities- no gross deformities of the visible extremities noted,   EXT:  no lower extremity edema  PSYCH: Alert and oriented times 3; speech- coherent  SKIN:  No obvious significant skin lesions on visible portions of face                 "

## 2021-04-12 NOTE — PATIENT INSTRUCTIONS
*  Resume the Lisinopril and HCTZ tablets as ordered.     *  Continue all other medications at the same doses.  Contact your usual pharmacy if you need refills.     *  Sulcrafate (Carafate) tablet as needed only.     *  Come the the [facility] pharmacy for blood pressure recheck in 4-6 weeks.

## 2021-04-22 ENCOUNTER — IMMUNIZATION (OUTPATIENT)
Dept: NURSING | Facility: CLINIC | Age: 61
End: 2021-04-22
Payer: COMMERCIAL

## 2021-04-22 PROCEDURE — 0001A PR COVID VAC PFIZER DIL RECON 30 MCG/0.3 ML IM: CPT

## 2021-04-22 PROCEDURE — 91300 PR COVID VAC PFIZER DIL RECON 30 MCG/0.3 ML IM: CPT

## 2021-05-13 ENCOUNTER — IMMUNIZATION (OUTPATIENT)
Dept: NURSING | Facility: CLINIC | Age: 61
End: 2021-05-13
Attending: INTERNAL MEDICINE
Payer: COMMERCIAL

## 2021-05-13 PROCEDURE — 91300 PR COVID VAC PFIZER DIL RECON 30 MCG/0.3 ML IM: CPT

## 2021-05-13 PROCEDURE — 0002A PR COVID VAC PFIZER DIL RECON 30 MCG/0.3 ML IM: CPT

## 2021-05-27 ENCOUNTER — MYC MEDICAL ADVICE (OUTPATIENT)
Dept: INTERNAL MEDICINE | Facility: CLINIC | Age: 61
End: 2021-05-27

## 2021-05-27 NOTE — TELEPHONE ENCOUNTER
I already know for the patient, but this almost certainly will lead to an FMLA form to be completed.  Patella patient in the MyChart note that he will need to schedule a virtual visit in order for me to complete the form correctly.

## 2021-05-27 NOTE — LETTER
May 27, 2021      Ab BEGUM  [facility] [address]        To whom it may concern:    Ab Aguilar has been dealing with acute grief issues and intermittent severe insomnia since the death of his wife earlier this year.  He reports severe fatigue on the days after the severe insomnia that causes drowsiness and decreased alertness which may affect his safety while working.  Due to these ongoing intermittent issues, over the next several months, he may need to miss occasional days when he is overly fatigued and does not feel safe working.    If you have any questions or concerns, please call the clinic at the number listed above.       Sincerely,       Shayne Suggs M.D.  Dept. of Internal Medicine  [facility]       cc: [email or web address]

## 2021-05-29 DIAGNOSIS — M19.041 PRIMARY OSTEOARTHRITIS OF BOTH HANDS: ICD-10-CM

## 2021-05-29 DIAGNOSIS — M19.042 PRIMARY OSTEOARTHRITIS OF BOTH HANDS: ICD-10-CM

## 2021-06-01 RX ORDER — CELECOXIB 200 MG/1
CAPSULE ORAL
Qty: 60 CAPSULE | Refills: 11 | Status: SHIPPED | OUTPATIENT
Start: 2021-06-01 | End: 2021-12-08

## 2021-06-01 NOTE — TELEPHONE ENCOUNTER
Celecoxib   Routing refill request to provider for review/approval because:  Labs not current:  ALT, AST   BP out of range     BP Readings from Last 3 Encounters:   04/12/21 (!) 160/110   08/20/20 (!) 144/92   10/28/19 (!) 194/116

## 2021-07-28 DIAGNOSIS — F51.01 PRIMARY INSOMNIA: ICD-10-CM

## 2021-07-28 RX ORDER — ZOLPIDEM TARTRATE 10 MG/1
TABLET ORAL
Qty: 30 TABLET | Refills: 3 | Status: SHIPPED | OUTPATIENT
Start: 2021-07-28 | End: 2021-11-18

## 2021-07-28 NOTE — TELEPHONE ENCOUNTER
Routing refill request to provider for review/approval because:  Drug not on the [facility] refill protocol     Pending Prescriptions:                       Disp   Refills    zolpidem (AMBIEN) 10 MG tablet [Pharmacy *30 tab*3            Sig: TAKE 1/2 TO 1 TABLET (5-10 MG) BY MOUTH NIGHTLY           AS NEEDED FOR SLEEP    Last Written Prescription Date:  4/6/21   Last Fill Quantity: 30,  # refills: 3   Last office visit: 4/12/2021 with prescribing provider:  susan Liang Office Visit:      Emilio Franklin RN  [facility]

## 2021-10-02 DIAGNOSIS — I10 ESSENTIAL HYPERTENSION, BENIGN: ICD-10-CM

## 2021-10-04 RX ORDER — METOPROLOL SUCCINATE 25 MG/1
TABLET, EXTENDED RELEASE ORAL
Qty: 90 TABLET | Refills: 1 | Status: SHIPPED | OUTPATIENT
Start: 2021-10-04 | End: 2021-12-08

## 2021-10-04 NOTE — TELEPHONE ENCOUNTER
Routing refill request to provider for review/approval because:  Labs out of range:    BP Readings from Last 3 Encounters:   04/12/21 (!) 160/110   08/20/20 (!) 144/92   10/28/19 (!) 194/116   Leelee Turner RN

## 2021-10-24 ENCOUNTER — HEALTH MAINTENANCE LETTER (OUTPATIENT)
Age: 61
End: 2021-10-24

## 2021-11-16 ENCOUNTER — MYC MEDICAL ADVICE (OUTPATIENT)
Dept: INTERNAL MEDICINE | Facility: CLINIC | Age: 61
End: 2021-11-16
Payer: COMMERCIAL

## 2021-11-16 DIAGNOSIS — I10 ESSENTIAL HYPERTENSION, BENIGN: ICD-10-CM

## 2021-11-16 DIAGNOSIS — K21.9 GASTROESOPHAGEAL REFLUX DISEASE WITHOUT ESOPHAGITIS: ICD-10-CM

## 2021-11-16 DIAGNOSIS — R73.03 PREDIABETES: ICD-10-CM

## 2021-11-16 DIAGNOSIS — F51.01 PRIMARY INSOMNIA: ICD-10-CM

## 2021-11-16 DIAGNOSIS — E78.5 HYPERLIPIDEMIA LDL GOAL <130: ICD-10-CM

## 2021-11-16 RX ORDER — OMEPRAZOLE 40 MG/1
CAPSULE, DELAYED RELEASE ORAL
Qty: 90 CAPSULE | Refills: 0 | Status: SHIPPED | OUTPATIENT
Start: 2021-11-16 | End: 2021-12-08

## 2021-11-16 RX ORDER — LISINOPRIL 40 MG/1
TABLET ORAL
Qty: 90 TABLET | Refills: 0 | Status: SHIPPED | OUTPATIENT
Start: 2021-11-16 | End: 2021-12-08 | Stop reason: ALTCHOICE

## 2021-11-16 RX ORDER — HYDROCHLOROTHIAZIDE 25 MG/1
TABLET ORAL
Qty: 90 TABLET | Refills: 0 | Status: SHIPPED | OUTPATIENT
Start: 2021-11-16 | End: 2021-12-08 | Stop reason: ALTCHOICE

## 2021-11-16 RX ORDER — PRAVASTATIN SODIUM 40 MG
TABLET ORAL
Qty: 90 TABLET | Refills: 0 | Status: SHIPPED | OUTPATIENT
Start: 2021-11-16 | End: 2021-12-08

## 2021-11-16 NOTE — TELEPHONE ENCOUNTER
Routing refill request to provider for review/approval because:  Labs out of range:    BP Readings from Last 3 Encounters:   04/12/21 (!) 160/110   08/20/20 (!) 144/92   10/28/19 (!) 194/116       Labs not current:    Creatinine   Date Value Ref Range Status   08/18/2020 0.81 0.66 - 1.25 mg/dL Final     LDL Cholesterol Calculated   Date Value Ref Range Status   08/18/2020 122 (H) <100 mg/dL Final     Comment:     Above desirable:  100-129 mg/dl  Borderline High:  130-159 mg/dL  High:             160-189 mg/dL  Very high:       >189 mg/dl       Potassium   Date Value Ref Range Status   08/18/2020 4.4 3.4 - 5.3 mmol/L Final       Leelee Turner RN

## 2021-11-16 NOTE — TELEPHONE ENCOUNTER
Prescription(s) sent electronically to specified pharmacy.    Needs follow up appointment in next 1-2 months

## 2021-11-18 RX ORDER — ZOLPIDEM TARTRATE 10 MG/1
TABLET ORAL
Qty: 30 TABLET | Refills: 3 | Status: SHIPPED | OUTPATIENT
Start: 2021-11-18 | End: 2022-03-08

## 2021-12-05 ASSESSMENT — ENCOUNTER SYMPTOMS
COUGH: 0
FEVER: 0
HEARTBURN: 0
SHORTNESS OF BREATH: 0
ARTHRALGIAS: 1
HEMATOCHEZIA: 0
ABDOMINAL PAIN: 0
SORE THROAT: 0
HEADACHES: 0
MYALGIAS: 0
HEMATURIA: 0
CHILLS: 0
WEAKNESS: 0
DIZZINESS: 0
PARESTHESIAS: 0
DYSURIA: 0
EYE PAIN: 0
JOINT SWELLING: 0
DIARRHEA: 0
PALPITATIONS: 0
FREQUENCY: 0
NAUSEA: 0
CONSTIPATION: 0
NERVOUS/ANXIOUS: 0

## 2021-12-08 ENCOUNTER — ANCILLARY PROCEDURE (OUTPATIENT)
Dept: GENERAL RADIOLOGY | Facility: CLINIC | Age: 61
End: 2021-12-08
Attending: INTERNAL MEDICINE
Payer: COMMERCIAL

## 2021-12-08 ENCOUNTER — OFFICE VISIT (OUTPATIENT)
Dept: INTERNAL MEDICINE | Facility: CLINIC | Age: 61
End: 2021-12-08
Payer: COMMERCIAL

## 2021-12-08 VITALS
BODY MASS INDEX: 39.48 KG/M2 | SYSTOLIC BLOOD PRESSURE: 126 MMHG | HEIGHT: 71 IN | DIASTOLIC BLOOD PRESSURE: 78 MMHG | TEMPERATURE: 97.5 F | OXYGEN SATURATION: 96 % | WEIGHT: 282 LBS | HEART RATE: 91 BPM

## 2021-12-08 DIAGNOSIS — E78.5 HYPERLIPIDEMIA LDL GOAL <130: ICD-10-CM

## 2021-12-08 DIAGNOSIS — E11.9 TYPE 2 DIABETES MELLITUS WITHOUT COMPLICATION, WITHOUT LONG-TERM CURRENT USE OF INSULIN (H): ICD-10-CM

## 2021-12-08 DIAGNOSIS — Z23 HIGH PRIORITY FOR 2019-NCOV VACCINE: ICD-10-CM

## 2021-12-08 DIAGNOSIS — M19.042 PRIMARY OSTEOARTHRITIS OF BOTH HANDS: ICD-10-CM

## 2021-12-08 DIAGNOSIS — M19.041 PRIMARY OSTEOARTHRITIS OF BOTH HANDS: ICD-10-CM

## 2021-12-08 DIAGNOSIS — M18.12 OSTEOARTHRITIS OF LEFT THUMB: ICD-10-CM

## 2021-12-08 DIAGNOSIS — M18.11 OSTEOARTHRITIS OF RIGHT THUMB: ICD-10-CM

## 2021-12-08 DIAGNOSIS — K21.9 GASTROESOPHAGEAL REFLUX DISEASE WITHOUT ESOPHAGITIS: ICD-10-CM

## 2021-12-08 DIAGNOSIS — R73.03 PREDIABETES: ICD-10-CM

## 2021-12-08 DIAGNOSIS — I10 ESSENTIAL HYPERTENSION, BENIGN: ICD-10-CM

## 2021-12-08 DIAGNOSIS — Z00.01 ENCOUNTER FOR ROUTINE ADULT MEDICAL EXAM WITH ABNORMAL FINDINGS: Primary | ICD-10-CM

## 2021-12-08 DIAGNOSIS — E66.01 SEVERE OBESITY (BMI 35.0-35.9 WITH COMORBIDITY) (H): ICD-10-CM

## 2021-12-08 DIAGNOSIS — Z23 NEED FOR PROPHYLACTIC VACCINATION AND INOCULATION AGAINST INFLUENZA: ICD-10-CM

## 2021-12-08 DIAGNOSIS — F51.01 PRIMARY INSOMNIA: ICD-10-CM

## 2021-12-08 LAB
ALT SERPL W P-5'-P-CCNC: 134 U/L (ref 0–70)
ANION GAP SERPL CALCULATED.3IONS-SCNC: 5 MMOL/L (ref 3–14)
AST SERPL W P-5'-P-CCNC: 77 U/L (ref 0–45)
BUN SERPL-MCNC: 14 MG/DL (ref 7–30)
CALCIUM SERPL-MCNC: 9.5 MG/DL (ref 8.5–10.1)
CHLORIDE BLD-SCNC: 97 MMOL/L (ref 94–109)
CO2 SERPL-SCNC: 30 MMOL/L (ref 20–32)
CREAT SERPL-MCNC: 0.71 MG/DL (ref 0.66–1.25)
GFR SERPL CREATININE-BSD FRML MDRD: >90 ML/MIN/1.73M2
GLUCOSE BLD-MCNC: 210 MG/DL (ref 70–99)
HBA1C MFR BLD: 8.9 % (ref 0–5.6)
HGB BLD-MCNC: 16.3 G/DL (ref 13.3–17.7)
POTASSIUM BLD-SCNC: 4.4 MMOL/L (ref 3.4–5.3)
PSA SERPL-MCNC: 0.6 UG/L (ref 0–4)
SODIUM SERPL-SCNC: 132 MMOL/L (ref 133–144)

## 2021-12-08 PROCEDURE — G0103 PSA SCREENING: HCPCS | Performed by: INTERNAL MEDICINE

## 2021-12-08 PROCEDURE — 85018 HEMOGLOBIN: CPT | Performed by: INTERNAL MEDICINE

## 2021-12-08 PROCEDURE — 83036 HEMOGLOBIN GLYCOSYLATED A1C: CPT | Performed by: INTERNAL MEDICINE

## 2021-12-08 PROCEDURE — 80061 LIPID PANEL: CPT | Performed by: INTERNAL MEDICINE

## 2021-12-08 PROCEDURE — 90682 RIV4 VACC RECOMBINANT DNA IM: CPT | Performed by: INTERNAL MEDICINE

## 2021-12-08 PROCEDURE — 90471 IMMUNIZATION ADMIN: CPT | Performed by: INTERNAL MEDICINE

## 2021-12-08 PROCEDURE — 80048 BASIC METABOLIC PNL TOTAL CA: CPT | Performed by: INTERNAL MEDICINE

## 2021-12-08 PROCEDURE — 84460 ALANINE AMINO (ALT) (SGPT): CPT | Performed by: INTERNAL MEDICINE

## 2021-12-08 PROCEDURE — 73130 X-RAY EXAM OF HAND: CPT | Mod: 59 | Performed by: RADIOLOGY

## 2021-12-08 PROCEDURE — 99396 PREV VISIT EST AGE 40-64: CPT | Mod: 25 | Performed by: INTERNAL MEDICINE

## 2021-12-08 PROCEDURE — 99214 OFFICE O/P EST MOD 30 MIN: CPT | Mod: 25 | Performed by: INTERNAL MEDICINE

## 2021-12-08 PROCEDURE — 0004A COVID-19,PF,PFIZER (12+ YRS): CPT | Performed by: INTERNAL MEDICINE

## 2021-12-08 PROCEDURE — 36415 COLL VENOUS BLD VENIPUNCTURE: CPT | Performed by: INTERNAL MEDICINE

## 2021-12-08 PROCEDURE — 91300 COVID-19,PF,PFIZER (12+ YRS): CPT | Performed by: INTERNAL MEDICINE

## 2021-12-08 PROCEDURE — 84450 TRANSFERASE (AST) (SGOT): CPT | Performed by: INTERNAL MEDICINE

## 2021-12-08 RX ORDER — FLASH GLUCOSE SCANNING READER
1 EACH MISCELLANEOUS ONCE
Qty: 1 EACH | Refills: 0 | Status: SHIPPED | OUTPATIENT
Start: 2021-12-08 | End: 2021-12-08

## 2021-12-08 RX ORDER — METOPROLOL SUCCINATE 25 MG/1
25 TABLET, EXTENDED RELEASE ORAL DAILY
Qty: 90 TABLET | Refills: 3 | Status: SHIPPED | OUTPATIENT
Start: 2021-12-08 | End: 2022-09-09

## 2021-12-08 RX ORDER — METFORMIN HCL 500 MG
500 TABLET, EXTENDED RELEASE 24 HR ORAL
Qty: 90 TABLET | Refills: 0 | Status: SHIPPED | OUTPATIENT
Start: 2021-12-08 | End: 2022-03-29

## 2021-12-08 RX ORDER — LISINOPRIL AND HYDROCHLOROTHIAZIDE 12.5; 2 MG/1; MG/1
2 TABLET ORAL EVERY MORNING
Qty: 180 TABLET | Refills: 3 | Status: SHIPPED | OUTPATIENT
Start: 2021-12-08 | End: 2022-09-09

## 2021-12-08 RX ORDER — CELECOXIB 200 MG/1
200 CAPSULE ORAL 2 TIMES DAILY
Qty: 180 CAPSULE | Refills: 3 | Status: SHIPPED | OUTPATIENT
Start: 2021-12-08 | End: 2022-09-09

## 2021-12-08 RX ORDER — FLASH GLUCOSE SENSOR
1 KIT MISCELLANEOUS
Qty: 2 EACH | Refills: 5 | Status: SHIPPED | OUTPATIENT
Start: 2021-12-08 | End: 2022-06-01

## 2021-12-08 RX ORDER — PRAVASTATIN SODIUM 40 MG
40 TABLET ORAL DAILY
Qty: 90 TABLET | Refills: 3 | Status: SHIPPED | OUTPATIENT
Start: 2021-12-08 | End: 2022-09-09

## 2021-12-08 RX ORDER — OMEPRAZOLE 40 MG/1
40 CAPSULE, DELAYED RELEASE ORAL DAILY
Qty: 90 CAPSULE | Refills: 3 | Status: SHIPPED | OUTPATIENT
Start: 2021-12-08 | End: 2022-06-01

## 2021-12-08 ASSESSMENT — ENCOUNTER SYMPTOMS
PALPITATIONS: 0
CHILLS: 0
EYE PAIN: 0
SORE THROAT: 0
HEMATURIA: 0
ARTHRALGIAS: 1
HEARTBURN: 0
DIZZINESS: 0
ABDOMINAL PAIN: 0
HEADACHES: 0
HEMATOCHEZIA: 0
WEAKNESS: 0
MYALGIAS: 0
FREQUENCY: 0
CONSTIPATION: 0
FEVER: 0
JOINT SWELLING: 0
NAUSEA: 0
SHORTNESS OF BREATH: 0
COUGH: 0
DYSURIA: 0
NERVOUS/ANXIOUS: 0
PARESTHESIAS: 0
DIARRHEA: 0

## 2021-12-08 ASSESSMENT — MIFFLIN-ST. JEOR: SCORE: 2116.27

## 2021-12-08 NOTE — PROGRESS NOTES
SUBJECTIVE:   CC: Ab Aguilar is an 61 year old male who presents for preventative health visit.       Patient has been advised of split billing requirements and indicates understanding: Yes  Healthy Habits:     Getting at least 3 servings of Calcium per day:  Yes    Bi-annual eye exam:  NO    Dental care twice a year:  NO    Sleep apnea or symptoms of sleep apnea:  None    Diet:  Regular (no restrictions)    Frequency of exercise:  None    Duration of exercise:  N/A    Taking medications regularly:  Yes    Barriers to taking medications:  None    Medication side effects:  None    PHQ-2 Total Score: 0    Additional concerns today:  Yes (Arthritis in hands?)          1.    Hypertension:  History of hypertension, on medication.  No reported side effects from medications.    Reviewed last 6 BP readings in chart:  BP Readings from Last 6 Encounters:   12/08/21 126/78   04/12/21 (!) 160/110   08/20/20 (!) 144/92   10/28/19 (!) 194/116   10/28/19 (!) 152/100   10/26/19 (!) 156/90     No active cardiac complaints or symptoms with exercise.     2.  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   Pain worse right thumb.     3.   The patient has had a history of ongoing obesity.  Reviewed the weigth curves.   Their current BMI is:  Body mass index is 39.33 kg/m .  Reviewed previous attempts at weight loss which have not been successful in producing prolonged weigth loss.   Discussed current eating and exercise habits.     Reviewed weights in chart:  Wt Readings from Last 10 Encounters:   12/08/21 127.9 kg (282 lb)   04/12/21 134.4 kg (296 lb 6.4 oz)   08/20/20 127.2 kg (280 lb 8 oz)   10/28/19 127 kg (280 lb)   10/28/19 127.9 kg (281 lb 14.4 oz)   10/26/19 127 kg (280 lb)   09/04/19 125.2 kg (276 lb)   03/14/19 117.9 kg (260 lb)   03/05/19 119.7 kg (264 lb)   03/12/18 121.2 kg (267 lb 3.2 oz)        4.  Insomina, uses zolpidem on near nightly basis withour  reported side effects, including no sleep walking.     5.  Still dealing with grief since his wife death earlier this year.     6.  history of prior prediabetes, has not been wathcing doet closely in the past year since his wife .   Reviewed labs:    Lab Results   Component Value Date          A1C 6.3 2020    A1C 6.0 2019    A1C 6.0 2019    A1C 5.9 10/30/2014    A1C 5.7 2013    A1C 6.1 2012    A1C 6.2 2009    A1C 5.9 2008    A1C 5.3 2007          Today's PHQ-2 Score:   PHQ-2 (  [employer]) 2021   Q1: Little interest or pleasure in doing things 0   Q2: Feeling down, depressed or hopeless 0   PHQ-2 Score 0   PHQ-2 Total Score (12-17 Years)- Positive if 3 or more points; Administer PHQ-A if positive -   Q1: Little interest or pleasure in doing things Not at all   Q2: Feeling down, depressed or hopeless Not at all   PHQ-2 Score 0       Abuse: Current or Past(Physical, Sexual or Emotional)- No  Do you feel safe in your environment? Yes        Social History     Tobacco Use     Smoking status: Never Smoker     Smokeless tobacco: Never Used   Substance Use Topics     Alcohol use: Yes     Comment: 10-12 beers/week     If you drink alcohol do you typically have >3 drinks per day or >7 drinks per week? Yes        AUDIT - Alcohol Use Disorders Identification Test - Reproduced from the World Health Organization Audit 2001 (Second Edition) 2020   1.  How often do you have a drink containing alcohol? 2 to 3 times a week   2.  How many drinks containing alcohol do you have on a typical day when you are drinking? 5 or 6   3.  How often do you have five or more drinks on one occasion? Weekly   4.  How often during the last year have you found that you were not able to stop drinking once you had started? Never   5.  How often during the last year have you failed to do what was normally expected of you because of drinking? Never   6.  How often during the last year have you  needed a first drink in the morning to get yourself going after a heavy drinking session? Never   7.  How often during the last year have you had a feeling of guilt or remorse after drinking? Never   8.  How often during the last year have you been unable to remember what happened the night before because of your drinking? Never   9.  Have you or someone else been injured because of your drinking? No   10. Has a relative, friend, doctor or other health care worker been concerned about your drinking or suggested you cut down? No   TOTAL SCORE 8       Last PSA:   PSA   Date Value Ref Range Status   08/18/2020 0.49 0 - 4 ug/L Final     Comment:     Assay Method:  Chemiluminescence using Siemens Vista analyzer       Reviewed orders with patient. Reviewed health maintenance and updated orders accordingly - Yes      Reviewed and updated as needed this visit by clinical staff  Tobacco  Allergies  Meds             Reviewed and updated as needed this visit by Provider                 **I reviewed the information recorded in the patient's EPIC chart (including but not limited to medical history, surgical history, family history, problem list, medication list, and allergy list) and updated the information as indicated based on the patients reported information.         Review of Systems   Constitutional: Negative for chills and fever.   HENT: Negative for congestion, ear pain, hearing loss and sore throat.    Eyes: Negative for pain and visual disturbance.   Respiratory: Negative for cough and shortness of breath.    Cardiovascular: Negative for chest pain, palpitations and peripheral edema.   Gastrointestinal: Negative for abdominal pain, constipation, diarrhea, heartburn, hematochezia and nausea.   Genitourinary: Negative for dysuria, frequency, genital sores, hematuria, impotence, penile discharge and urgency.   Musculoskeletal: Positive for arthralgias. Negative for joint swelling and myalgias.   Skin: Negative for  "rash.   Neurological: Negative for dizziness, weakness, headaches and paresthesias.   Psychiatric/Behavioral: Negative for mood changes. The patient is not nervous/anxious.      CONSTITUTIONAL: NEGATIVE for fever, chills, change in weight  INTEGUMENTARY/SKIN: NEGATIVE for worrisome rashes, moles or lesions  EYES: NEGATIVE for vision changes or irritation  ENT: NEGATIVE for ear, mouth and throat problems  RESP: NEGATIVE for significant cough or SOB  CV: NEGATIVE for chest pain, palpitations or peripheral edema  GI: NEGATIVE for nausea, abdominal pain, heartburn, or change in bowel habits   male: negative for dysuria, hematuria, decreased urinary stream, erectile dysfunction, urethral discharge  MUSCULOSKELETAL: NEGATIVE for significant arthralgias or myalgia  NEURO: NEGATIVE for weakness, dizziness or paresthesias  PSYCHIATRIC: NEGATIVE for changes in mood or affect    OBJECTIVE:   /78 (BP Location: Left arm, Cuff Size: Adult Large)   Pulse 91   Temp 97.5  F (36.4  C) (Temporal)   Ht 1.803 m (5' 11\")   Wt 127.9 kg (282 lb)   SpO2 96%   BMI 39.33 kg/m      Physical Exam  GENERAL alert and no distress  EYES:  Normal sclera,conjunctiva, EOMI  HENT: oral and posterior pharynx without lesions or erythema, facies symmetric  NECK: Neck supple. No LAD, without thyroidmegaly.  RESP: Clear to ausculation bilaterally without wheezes or crackles. Normal BS in all fields.  CV: RRR normal S1S2 without murmurs, rubs or gallops.  LYMPH: no cervical lymph adenopathy appreciated  MS: extremities- no gross deformities of the visible extremities noted,   EXT:  no lower extremity edema  PSYCH: Alert and oriented times 3; speech- coherent  SKIN:  No obvious significant skin lesions on visible portions of face     Diagnostic Test Results:  Labs reviewed in Epic and in clinci today as well:    Results for orders placed or performed in visit on 12/08/21   XR Hand Bilateral G/E 3 Views     Status: None    Narrative    XR HAND " BILATERAL G/E 3 VIEWS 12/8/2021 3:08 PM     HISTORY: Osteoarthritis of right thumb; Osteoarthritis of left thumb    COMPARISON: None.      Impression    IMPRESSION: Mild to moderate degenerative changes in the right first  CMC joint and third MCP joint. Mild degenerative changes in the left  first CMC joint and multiple bilateral MCP and IP joints.    ADAM ESPINOSA MD         SYSTEM ID:  [id number]   Results for orders placed or performed in visit on 12/08/21   Basic metabolic panel     Status: Abnormal   Result Value Ref Range    Sodium 132 (L) 133 - 144 mmol/L    Potassium 4.4 3.4 - 5.3 mmol/L    Chloride 97 94 - 109 mmol/L    Carbon Dioxide (CO2) 30 20 - 32 mmol/L    Anion Gap 5 3 - 14 mmol/L    Urea Nitrogen 14 7 - 30 mg/dL    Creatinine 0.71 0.66 - 1.25 mg/dL    Calcium 9.5 8.5 - 10.1 mg/dL    Glucose 210 (H) 70 - 99 mg/dL    GFR Estimate >90 >60 mL/min/1.73m2   Hemoglobin     Status: Normal   Result Value Ref Range    Hemoglobin 16.3 13.3 - 17.7 g/dL   PSA, screen     Status: Normal   Result Value Ref Range    Prostate Specific Antigen Screen 0.60 0.00 - 4.00 ug/L   Hemoglobin A1c     Status: Abnormal   Result Value Ref Range    Hemoglobin A1C 8.9 (H) 0.0 - 5.6 %    Narrative    Results confirmed by repeat test.     AST     Status: Abnormal   Result Value Ref Range    AST 77 (H) 0 - 45 U/L   ALT     Status: Abnormal   Result Value Ref Range     (H) 0 - 70 U/L        ASSESSMENT/PLAN:     (Z00.01) Encounter for routine adult medical exam with abnormal findings  (primary encounter diagnosis)  Comment: Discussed cardiac disease risk factor modification including screening, preventing, and treating hypertension, elevated lipids, diabetes, and smoking cessation.    Discussed age appropriate cancer screening recommendations as dictated by age group and past medical history.    Recommended making better food choices as often as possible, including lower carb, lower fat, lower salt diet and moderation in any  alcohol intake.    Recommended maintaining regular physical activity/exercise throughout their lifetime.  Recommended safety and injury prevention (i.e. seatbelt use, safety equipment like helmets when biking, etc).       Plan:     (E11.9) Type 2 diabetes mellitus without complication, without long-term current use of insulin (H)  Comment: previosuly pre-diabetic, no actual type II diabetes.   Discussed importance of aggressive management of diabetes, including aggressive low cabr diet (one of the most powerful ways to control type II DM), performing regular glucose monitoring, (either with standard glucometer, or preferably personal continuous glucose monitor), medication compliance, regular laboratory monitoring at least every 6 months (or possibly more often if diabetes not at goal), and attending regular follow up appointments as ordered.    Aggressive diabetes management of diabetes will greatly reduce the future risk of diabetes related complications such as CAD, CVA, PVD, and retinopathy/neuropathy/nephropathy.  Based on level of diabetes control: testing frequency ONE TIME PER DAY     I feel that his glucose levels will go down significanlty with better lower cabr diet, as he has done before.   After disucssion of the low cabr diet, he admits that he has gotten very carb heavy in his intake, especailyl since his wife  and he is responsible for his own food.     Start metfomrin, reviewed side effects   Discussed monitoring  I recommended that the patient investigate personal continuous glucose monitoring systems (such as Freestyle Lynnette or DexCom, etc.) as an alternative to the standard glucometer.  These systems allow for continuous glucose monitoring for 7-14 days and will provide much more data regarding glucose fluctuations, giving real time feedback on the effect of lifestyle and diet interventions, and medication effect.  I described the basics on how these devices operate and the potentially huge  "benefit that comes with closer observation of glucose levels.  Discussed the goal of \"time in the target range\" of 70-75% with very few , if any, low glucose readings (below 2% ideally)       Plan: metFORMIN (GLUCOPHAGE-XR) 500 MG 24 hr tablet,         Basic metabolic panel, Hemoglobin A1c,         Continuous Blood Gluc  (FREESTYLE RONNIE        14 DAY READER) RIC, Continuous Blood Gluc         Sensor (FREESTYLE RONNIE 14 DAY SENSOR) [facility]            (E66.01,  Z68.35) Severe obesity (BMI 35.0-35.9 with comorbidity) (H)  Comment: type Ii diabetes is a consequence of his obesity.   Current BMI is: Body mass index is 39.33 kg/m ..  Reviewed weight patterns.   Obesity as a biological preventable and treatable disease, which is associated with significantly increased risk of many acute and chronic health conditions.   Obesity has now been recognized as a chronic disease by the American Medical Association.  Obesity has serious co-morbidities associated with obesity including increased risk for hypertension, stroke, coronary artery disease, dyslipidemia, Type II diabetes, depression, sleep apnea, cancers of the colon, breast and endometrium, obstructive sleep apnea, osteoarthritis and female infertility.  Recommended regular aerobic exercise, recommended improved diet aiming at lowering amount of processed foods, lower sugars, moderation/reduction of simple carbs and fat in the diet, establishing more regular meal times, always eating breakfast, front loading some of the calories and adding more protein to the diet.        Plan:     (I10) Essential hypertension, benign  Comment: This condition is currently controlled on the current medical regimen.  Continue current therapy.   Plan: metoprolol succinate ER (TOPROL-XL) 25 MG 24 hr        tablet, lisinopril-hydrochlorothiazide         (ZESTORETIC) 20-12.5 MG tablet, Lipid panel         reflex to direct LDL Fasting, Basic metabolic         panel, Hemoglobin, PSA, " screen, Hemoglobin A1c            (R73.03) Prediabetes  Comment: no longer just prediabetic.   Plan: pravastatin (PRAVACHOL) 40 MG tablet, Lipid         panel reflex to direct LDL Fasting, Basic         metabolic panel, Hemoglobin, PSA, screen,         Hemoglobin A1c, AST, ALT            (K21.9) Gastroesophageal reflux disease without esophagitis  Comment: This condition is currently controlled on the current medical regimen.  Continue current therapy.   Plan: omeprazole (PRILOSEC) 40 MG DR capsule            (E78.5) Hyperlipidemia LDL goal <130  Comment: Discussed guidelines recommending a statin cholesterol lowering medication for any patient with either diabetes and/or vascular disease, aiming for a LDL goal under 100 for sure, ideally under 70, using whatever dose of statin tolerated.    Plan: pravastatin (PRAVACHOL) 40 MG tablet, Lipid         panel reflex to direct LDL Fasting, Basic         metabolic panel, Hemoglobin, PSA, screen,         Hemoglobin A1c, AST, ALT            (F51.01) Primary insomnia  Comment: This condition is currently controlled on the current medical regimen.  Continue current therapy.   Plan:     (M19.041,  M19.042) Primary osteoarthritis of both hands  Comment:   Plan: celecoxib (CELEBREX) 200 MG capsule            (M18.11) Osteoarthritis of right thumb  Comment: referral to Hand orthopedics for possible injeciton.   He may eventually need joint replacement of the 1st MCP  Plan: XR Hand Bilateral G/E 3 Views, Orthopedic          Referral, Orthopedic          Referral            (Z23) High priority for 2019-nCoV vaccine  Comment:   Plan: COVID-19,PF,PFIZER (12+ Yrs PURPLE LABEL)            (M18.12) Osteoarthritis of left thumb  Comment:   Plan: XR Hand Bilateral G/E 3 Views, Orthopedic          Referral, Orthopedic          Referral            (Z23) Need for prophylactic vaccination and inoculation against influenza  Comment:   Plan:        Patient has  "been advised of split billing requirements and indicates understanding: Yes  COUNSELING:   Reviewed preventive health counseling, as reflected in patient instructions       Regular exercise       Healthy diet/nutrition       Vision screening       Hearing screening       Immunizations    Vaccinated for: Influenza and pfizer covid           Aspirin prophylaxis        Alcohol Use        Colon cancer screening       Prostate cancer screening    Estimated body mass index is 39.33 kg/m  as calculated from the following:    Height as of this encounter: 1.803 m (5' 11\").    Weight as of this encounter: 127.9 kg (282 lb).         He reports that he has never smoked. He has never used smokeless tobacco.      Counseling Resources:  ATP IV Guidelines  Pooled Cohorts Equation Calculator  FRAX Risk Assessment  ICSI Preventive Guidelines  Dietary Guidelines for Americans, 2010  [employer] MyPlate  ASA Prophylaxis  Lung CA Screening      Discussed that additional charges may be applied for addressing concerns at today's visit above and beyond what is covered by the \"routine physical exam\" diagnosis code.  Patient verbalized understanding and would like additional concerns addressed today.    Spent greater than 15 minutes above and beyond routine Health Care Maintenence issues on the above mentioned additional problem(s), includign discussion int he visit about new onset type II diabetes.       Shayne Suggs MD  [facility]  "

## 2021-12-08 NOTE — PATIENT INSTRUCTIONS
"*  Hypertension:     --blood pressure well controlled.       *  Arthritis of the thumbs:     --referral to Hand surgeon for steroid injections into the thumb joints.      --if the osteoarthritis worsens and continues, joint replacement can be considered based on your symptoms.      --Hand Surgeon at [facility] [place] [phone]   [email or web address]       *  New onset diabetes:     --start low dose medication:  Metformin 500 mg once per day at supper     --the most powerful way to control type II diabetes is through diet control     --reduce the intake of \"simple carbohydrates\" (e.g. White bread, white rice, pasta, noodles, potatoes, snack foods, regular soda, juices (except fresh squeezed), cakes, cookies, candy, etc.)     *  Continue all other medications at the same doses.  Contact your usual pharmacy if you need refills.     *  Return to see me in approximately 3 months, sooner if needed.  Please get non-fasting labs done at the [facility] or any other [facility] Lab lab 1-2 days before this appointment (schedule a \"lab appointment\").  If you get the labs done at another clinic, make arrangements with them directly.  The orders will be in place.  Use [employer] or Call [phone] to schedule the appointment with me and lab appointment.        *  Investigate a personal continuous glucose monitoring  system (most common devices are Freestyle Lynnette and DexCom).  These systems allow for continuous glucose monitoring all throughout the day for 7-14 days and will show you the ways your glucose will fluctuate over this period by measuring your glucose every minute.  The sensor is applied to the back of your arm, and sits in place for up to 7-14 days, depending on which version you get.  You can really see how your diet and lifestyle changes affect your glucose levels.    If covered or reasonable expense, please consider getting it.      *  The goal for your diabetes " "control is to keep the glucose between , 70-75% of the time with very few low readings below 70.    Do NOT be alarmed if you see the glucose go very high, it always goes down.      *  Go to the \"settings\" button and Set the \"Target Range\" between .     *  At the end of the day, review the glucose readings and see if it was a good day for the diabetes or not.  If it was a good day ( time in target range 75% or better), then do more of the things that made it a good day.  If it was not a good day for the diabetes (time in target range LESS than 75% of the time), then review why it may have been a bad day for the diabetes and reconsider and change those variables moving forward.     *  The glucose readings from the continuous glucose monitor will not directly compare with those from a standard glucometer because they measure the glucose in slightly different ways, they are usually about 30-60 minutes later from what you would see on a standard glucometer.     Check their web sites for more details:      --Freestyle Lynnette: https://www.freestyleSmart Wire Gridre.Lifeblob/  For the FreestyleLIbre system, Check for eligibility for a voucher from the  for discounted Lynnette prices: [phone]  (may cost no more than $75/month if you are eligible)    (sign up for the Free trial to experience the unit and see how it can help you)    --Dexcom:  https://www.dexcom.com/    In case you are worried about keeping the CGM sensor in place, the following are all patches meant to cover/secure medical devices.  All of them have device specific options (e.g., the patches for Dexcom have a rectangular hole cut, Lynnette and Guardian have no hole, etc.)  The first three are all available on Amazon.  Sugar Patch is ordered direct.  Sugar Patch is popular because it comes in many patterns and designs for those that want to use the patch as a fashion accessory.    * 9+  http://simpatchnyc.com/  * " "GrifGrips  https://www.ETF.com.com/  *Skin   https://Chlorogen.Neurotrack/  *Sugar Patch  https://theAvnerapatch.shop  *Hypafix is a good, all-purpose tape to cover most anything.  Waterproof and most people don't get any irritation.  I use the 2\" - 2 pack of 2\" x 30' rolls is $13.  https://www.[employer]/s?k=hypafix+tape         "

## 2021-12-13 PROBLEM — E11.9 TYPE 2 DIABETES MELLITUS WITHOUT COMPLICATION, WITHOUT LONG-TERM CURRENT USE OF INSULIN (H): Status: ACTIVE | Noted: 2021-12-13

## 2021-12-15 LAB
CHOLEST SERPL-MCNC: 202 MG/DL
FASTING STATUS PATIENT QL REPORTED: NO
HDLC SERPL-MCNC: 48 MG/DL
LDLC SERPL CALC-MCNC: 106 MG/DL
NONHDLC SERPL-MCNC: 154 MG/DL
TRIGL SERPL-MCNC: 241 MG/DL

## 2022-03-25 ENCOUNTER — LAB (OUTPATIENT)
Dept: LAB | Facility: CLINIC | Age: 62
End: 2022-03-25
Payer: COMMERCIAL

## 2022-03-25 DIAGNOSIS — E11.9 TYPE 2 DIABETES MELLITUS WITHOUT COMPLICATION, WITHOUT LONG-TERM CURRENT USE OF INSULIN (H): ICD-10-CM

## 2022-03-25 LAB
ANION GAP SERPL CALCULATED.3IONS-SCNC: 9 MMOL/L (ref 3–14)
BUN SERPL-MCNC: 16 MG/DL (ref 7–30)
CALCIUM SERPL-MCNC: 10 MG/DL (ref 8.5–10.1)
CHLORIDE BLD-SCNC: 101 MMOL/L (ref 94–109)
CO2 SERPL-SCNC: 23 MMOL/L (ref 20–32)
CREAT SERPL-MCNC: 0.87 MG/DL (ref 0.66–1.25)
GFR SERPL CREATININE-BSD FRML MDRD: >90 ML/MIN/1.73M2
GLUCOSE BLD-MCNC: 110 MG/DL (ref 70–99)
HBA1C MFR BLD: 5.8 % (ref 0–5.6)
POTASSIUM BLD-SCNC: 4.5 MMOL/L (ref 3.4–5.3)
SODIUM SERPL-SCNC: 133 MMOL/L (ref 133–144)

## 2022-03-25 PROCEDURE — 80048 BASIC METABOLIC PNL TOTAL CA: CPT

## 2022-03-25 PROCEDURE — 36415 COLL VENOUS BLD VENIPUNCTURE: CPT

## 2022-03-25 PROCEDURE — 83036 HEMOGLOBIN GLYCOSYLATED A1C: CPT

## 2022-03-28 DIAGNOSIS — E11.9 TYPE 2 DIABETES MELLITUS WITHOUT COMPLICATION, WITHOUT LONG-TERM CURRENT USE OF INSULIN (H): ICD-10-CM

## 2022-03-29 RX ORDER — METFORMIN HCL 500 MG
TABLET, EXTENDED RELEASE 24 HR ORAL
Qty: 90 TABLET | Refills: 0 | Status: SHIPPED | OUTPATIENT
Start: 2022-03-29 | End: 2022-06-23

## 2022-03-29 NOTE — TELEPHONE ENCOUNTER
Prescription approved per [facility] Refill Protocol.    Rhianna Howe RN

## 2022-04-01 ENCOUNTER — OFFICE VISIT (OUTPATIENT)
Dept: INTERNAL MEDICINE | Facility: CLINIC | Age: 62
End: 2022-04-01
Payer: COMMERCIAL

## 2022-04-01 VITALS
HEART RATE: 81 BPM | RESPIRATION RATE: 16 BRPM | TEMPERATURE: 97.1 F | BODY MASS INDEX: 34.58 KG/M2 | HEIGHT: 71 IN | WEIGHT: 247 LBS | DIASTOLIC BLOOD PRESSURE: 72 MMHG | OXYGEN SATURATION: 98 % | SYSTOLIC BLOOD PRESSURE: 124 MMHG

## 2022-04-01 DIAGNOSIS — I10 ESSENTIAL HYPERTENSION, BENIGN: ICD-10-CM

## 2022-04-01 DIAGNOSIS — E11.9 TYPE 2 DIABETES MELLITUS WITHOUT COMPLICATION, WITHOUT LONG-TERM CURRENT USE OF INSULIN (H): Primary | ICD-10-CM

## 2022-04-01 DIAGNOSIS — Z12.11 SCREEN FOR COLON CANCER: ICD-10-CM

## 2022-04-01 DIAGNOSIS — K21.9 GASTROESOPHAGEAL REFLUX DISEASE WITHOUT ESOPHAGITIS: ICD-10-CM

## 2022-04-01 DIAGNOSIS — E66.01 SEVERE OBESITY (BMI 35.0-35.9 WITH COMORBIDITY) (H): ICD-10-CM

## 2022-04-01 DIAGNOSIS — Z13.6 CARDIOVASCULAR SCREENING; LDL GOAL LESS THAN 100: ICD-10-CM

## 2022-04-01 DIAGNOSIS — E78.5 HYPERLIPIDEMIA LDL GOAL <130: ICD-10-CM

## 2022-04-01 PROCEDURE — 99214 OFFICE O/P EST MOD 30 MIN: CPT | Performed by: INTERNAL MEDICINE

## 2022-04-01 NOTE — PROGRESS NOTES
Assessment & Plan     (E11.9) Type 2 diabetes mellitus without complication, without long-term current use of insulin (H)  (primary encounter diagnosis)  Comment: Doing very well with much improved control mostly due to excellent diet choices.  Continue low-dose Metformin.  Discussed importance of aggressive management of diabetes, including aggressive low cabr diet (one of the most powerful ways to control type II DM), performing regular glucose monitoring, (either with standard glucometer, or preferably personal continuous glucose monitor), medication compliance, regular laboratory monitoring at least every 6 months (or possibly more often if diabetes not at goal), and attending regular follow up appointments as ordered.    Aggressive diabetes management of diabetes will greatly reduce the future risk of diabetes related complications such as CAD, CVA, PVD, and retinopathy/neuropathy/nephropathy.  Based on level of diabetes control: testing frequency ONE TIME PER DAY with a standard glucometer, however I credit his significant improvement in diabetes control due to the personal continuous glucose monitor, where he has seen the effect of the diet choices on a real-time basis.  Use that information to guide him in making better food choices and this is directly responsible for the improvement in his diabetes.  Plan: REVIEW OF HEALTH MAINTENANCE PROTOCOL ORDERS,         Lipid panel reflex to direct LDL Fasting,         Comprehensive metabolic panel, Hemoglobin A1c,         Albumin Random Urine Quantitative with Creat         Ratio, CBC with platelets            (Z12.11) Screen for colon cancer  Comment: I discussed current recommendations for routine colon cancer screening starting at age 45 (age 35 for family history of colon cancer).  Recommending colonoscopy as gold standard test, but the patient is declining to do colonoscopy at this time.   Discussed ColoGuard stool test for routine colon cancer screening, and  will order it if covered by their insurance.   If result Negative, repeat ColoGuard testing every 3 years (or eventually consider colonoscopy)  If result Positive, does not necessarily mean colon cancer, but means they need colonoscopy.    Plan: REVIEW OF HEALTH MAINTENANCE PROTOCOL ORDERS,         COLOGUARD(EXACT SCIENCES)            (E78.5) Hyperlipidemia LDL goal <130  Comment: Discussed guidelines recommending a statin cholesterol lowering medication for any patient with either diabetes and/or vascular disease, aiming for a LDL goal under 100 for sure, ideally under 70, using whatever dose of statin tolerated.    Plan: REVIEW OF HEALTH MAINTENANCE PROTOCOL ORDERS,         Lipid panel reflex to direct LDL Fasting,         Comprehensive metabolic panel            (E66.01,  Z68.35) Severe obesity (BMI 35.0-35.9 with comorbidity) (H)  Comment: Significant improvement in his weight with lifestyle choices.  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  Current BMI is: Body mass index is 34.45 kg/m ..  Reviewed weight patterns.   Obesity as a biological preventable and treatable disease, which is associated with significantly increased risk of many acute and chronic health conditions.   Obesity has now been recognized as a chronic disease by the American Medical Association.  Obesity has serious co-morbidities associated with obesity including increased risk for hypertension, stroke, coronary artery disease, dyslipidemia, Type II diabetes, depression, sleep apnea, cancers of the colon, breast and endometrium, obstructive sleep apnea, osteoarthritis and female infertility.  Recommended regular aerobic exercise, recommended improved diet aiming at lowering amount of processed foods, lower sugars, moderation/reduction of simple carbs and fat in the diet, establishing more regular meal times, always eating breakfast, front loading some  "of the calories and adding more protein to the diet.        Plan: REVIEW OF HEALTH MAINTENANCE PROTOCOL ORDERS            (K21.9) Gastroesophageal reflux disease without esophagitis  Comment: Much improved with diet changes.  Continue same medicines.  Plan: REVIEW OF HEALTH MAINTENANCE PROTOCOL ORDERS            (Z13.6) CARDIOVASCULAR SCREENING; LDL GOAL LESS THAN 100  Comment: Discussed cardiac disease risk factors and cardiac disease risk factor modification.   Plan: REVIEW OF HEALTH MAINTENANCE PROTOCOL ORDERS            (I10) Essential hypertension, benign  Comment: This condition is currently controlled on the current medical regimen.  Continue current therapy.   Discussed current hypertension treatment guidelines, including indications for treatment and treatment options.  Discussed the importance for aggressive management of HTN to prevent vascular complications later.  Recommended lower fat, lower carbohydrate, and lower sodium (<2000 mg)diet.  Discussed required intervals for follow up on HTN, lab studies.  Recommened pt. occasionally follow their blood pressures outside the clinic to ensure that BPs are remaining within guidelines, and to contact me if the readings are not within guidelines on a regular basis so we can adjust treatment as needed.   Plan: REVIEW OF HEALTH MAINTENANCE PROTOCOL ORDERS,         Comprehensive metabolic panel, CBC with         platelets                        BMI:   Estimated body mass index is 34.45 kg/m  as calculated from the following:    Height as of this encounter: 1.803 m (5' 11\").    Weight as of this encounter: 112 kg (247 lb).           Return in about 6 months (around 10/1/2022) for Blood pressure, Lab Work, with pre-visit fasting labs.    Shayne Suggs MD  [facility]    Duane Berger is a 61 year old who presents for the following health issues     History of Present Illness       Diabetes:   He presents for follow up " of diabetes.  He is checking home blood glucose three times daily. He checks blood glucose before and after meals and at bedtime.  Blood glucose is never over 200 and never under 70. When his blood glucose is low, the patient is asymptomatic for confusion, blurred vision, lethargy and reports not feeling dizzy, shaky, or weak.  He has no concerns regarding his diabetes at this time.  He is not experiencing numbness or burning in feet, excessive thirst, blurry vision, weight changes or redness, sores or blisters on feet. The patient has not had a diabetic eye exam in the last 12 months.         He eats 4 or more servings of fruits and vegetables daily.He consumes 0 sweetened beverage(s) daily.He exercises with enough effort to increase his heart rate 20 to 29 minutes per day.  He exercises with enough effort to increase his heart rate 5 days per week.   He is taking medications regularly.       The patient has had a history of ongoing obesity.  Reviewed the weigth curves.   Their current BMI is:  Body mass index is 34.45 kg/m .  Reviewed previous attempts at weight loss which have not been successful in producing prolonged weigth loss.   Discussed current eating and exercise habits.     **Has lost a tremendous amount of weight in the last several months due to greatly improved eating, greatly reducing carbohydrate intake and discontinuing most beer.    Reviewed weights in chart:  Wt Readings from Last 10 Encounters:   04/01/22 112 kg (247 lb)   12/08/21 127.9 kg (282 lb)   04/12/21 134.4 kg (296 lb 6.4 oz)   08/20/20 127.2 kg (280 lb 8 oz)   10/28/19 127 kg (280 lb)   10/28/19 127.9 kg (281 lb 14.4 oz)   10/26/19 127 kg (280 lb)   09/04/19 125.2 kg (276 lb)   03/14/19 117.9 kg (260 lb)   03/05/19 119.7 kg (264 lb)       History of reflux, significant improvement with him changes in his diet.    **I reviewed the information recorded in the patient's EPIC chart (including but not limited to medical history, surgical  "history, family history, problem list, medication list, and allergy list) and updated the information as indicated based on the patients reported information.             Review of Systems   Constitutional, HEENT, cardiovascular, pulmonary, gi and gu systems are negative, except as otherwise noted.      Objective    /72   Pulse 81   Temp 97.1  F (36.2  C) (Temporal)   Resp 16   Ht 1.803 m (5' 11\")   Wt 112 kg (247 lb)   SpO2 98%   BMI 34.45 kg/m    Body mass index is 34.45 kg/m .  Physical Exam   GENERAL alert and no distress  EYES:  Normal sclera,conjunctiva, EOMI  HENT: oral and posterior pharynx without lesions or erythema, facies symmetric  NECK: Neck supple. No LAD, without thyroidmegaly.  RESP: Clear to ausculation bilaterally without wheezes or crackles. Normal BS in all fields.  CV: RRR normal S1S2 without murmurs, rubs or gallops.  LYMPH: no cervical lymph adenopathy appreciated  MS: extremities- no gross deformities of the visible extremities noted,   EXT:  no lower extremity edema  PSYCH: Alert and oriented times 3; speech- coherent  SKIN:  No obvious significant skin lesions on visible portions of face                 "

## 2022-04-01 NOTE — PATIENT INSTRUCTIONS
"*  Congratulations on the weigh loss and significant improvement on the diabetes.     *  This is 95% due to the great changes you have made in your diet and alcohol reduction.     *  Continue all medications at the same doses.  Contact your usual pharmacy if you need refills.     *  Return to see me in 6 months, sooner if needed.  Please get fasting labs done at the [facility] or any other [facility] Lab lab 1-2 days before this appointment (schedule a \"lab appointment\").  If you get the labs done at another clinic, make arrangements with them directly.  The orders will be in place.  Eat nothing for at least 8 hours prior to having these labs drawn.  Use [employer] or Call [phone] to schedule the appointment with me and lab appointment.           Shingles Vaccine (SHINGRIX):        I would recommend that you consider getting the Shingrix shingles vaccine.  The shingles vaccine is recommended for anyone over age 50.       The Shingrix vaccine is a series of 2 vaccines given 2-6 months apart.       The shingles vaccine does not stop you from getting shingles, but it decreases the intensity of the event, the duration of the event, and decreases the painful nerve condition that results       Based on the available data, the Shingrix vaccine has superior benefit and should be considered even if you have had the old Zostavax shinglesvaccine before.        Contact your insurance provider and ask them if either shingles vaccine is covered and is so, how much it will cost you.  Usually this will be cheaper to get in a pharmacy given by the pharmacist.      Regardless of the coverage, I would recommend that you consider the vaccine since shingles is very painful, (just ask anyone who has ever had it)      For Medicare insurance, the vaccine is cheaper to receive from a pharmacist in a pharmacy than for us to give you in the clinic.        ColoGuard Colon cancer screening:     *  The ColoGuard stool test is " good noninvasive way to screen for colon cancer, in average risk individuals.      *  You should NOT use ColoGuard for colon cancer screening if you have a family history of colon cancer or if you have a history of pre-cancerous polyps, you should have a colonoscopy if you have either of these situations.      *  The ColoGuard collection kit will be mailed to your home address by the [employer].  Follow the instructions for collecting and returning the tablet.      *  I will inform you about the results.     *  If the ColoGuard test is Negative, then no colon cancer screening needed for 3 years (would consider either repeating the ColoGuard test or else doing a colonoscopy).     *  If the ColoGuard test is Positive, this does NOT mean that you have colon cacner, it simply means that you will need to have a colonoscopy.  Most of the time, pre-cancerous polyps can turn this test positive.              5 GOALS IN MANAGING DIABETES (i.e. to give the best chance to prevent diabetic complications and vascular disease):     1.  Aggressive diabetic management.  The target for A1C (3 months average blood sugar) is ideally below 6.5, preferably below 7.5    Your diabetes is under good control.      Lab Results   Component Value Date    A1C 5.8 03/25/2022    A1C 8.9 12/08/2021    A1C 6.3 08/18/2020    A1C 6.0 08/30/2019    A1C 6.0 03/05/2019    A1C 5.9 10/30/2014    A1C 5.7 09/23/2013    A1C 6.1 09/26/2012    A1C 6.2 09/28/2009    A1C 5.9 04/08/2008    A1C 5.3 09/27/2007       2.  Aggressive blood pressure control, under 130/80 ideally.  Using medications if needed.    Your blood pressure is under good control    BP Readings from Last 4 Encounters:   04/01/22 124/72   12/08/21 126/78   04/12/21 (!) 160/110   08/20/20 (!) 144/92       3.  Aggressive LDL cholesterol (bad cholesterol) lowering as needed.  Your goal is an LDL under 100 for sure, preferably under 70.     *  All patients with diabetes are recommended to be on  "a \"statin\" cholesterol lowering medication regardless of the cholesterol levels to give the best chance at avoiding vascular disease.      New guidelines identify four high-risk groups who could benefit from statins:   *people with pre-existing heart disease, such as those who have had a heart attack;   *people ages 40 to 75 who have diabetes of any type  *patients ages 40 to 75 with at least a 7.5% risk of developing cardiovascular disease over the next decade, according to a formula described in the guidelines  *patients with the sort of super-high cholesterol that sometimes runs in families, as evidenced by an LDL of 190 milligrams per deciliter or higher    *  Your cholesterol levels are well controlled.    Recent Labs   Lab Test 12/08/21  1454 08/18/20  0812 01/27/16  0746 10/30/14  0751   CHOL 202* 197   < > 220*   HDL 48 36*   < > 43   * 122*   < > 139*   TRIG 241* 193*   < > 192*   CHOLHDLRATIO  --   --   --  5.1*    < > = values in this interval not displayed.       4.  No smoking    5.  Consider daily preventative Aspirin once per day, every day over age 50 unless there is a specific reason that you cannot take aspirin.       *You should take Aspirin 81 mg once per day, unless you have a reason NOT to take aspirin (i.e. side effect, excessive bruising or bleeding, taking another \"blood tinning\" medication, etc.)       DIABETES REMINDERS:   1. Check your blood glucose regularly either with standard glucometer or personal continuous glucose monitor.    2) Your blood sugar goals:  before eating and  two hours after eating.  If using personal continuous glucose monitor, the goal is Time in the Target range ( ) of 70-75% with very few (under 2%) Below target.    3) Always be prepared to treat low blood sugar symptoms should it happen. Keep a sugar-containing beverage or food nearby.  4) When to call your clinic:     Blood sugar over 400     If you have 2 to 3 low blood sugars (under 70) " in a row    Low readings the same time of day several days in a row  5) When to call 911: If your low blood glucose symptoms do not get better with treatment, or if you/someone else is unable to give you treatment.  6) Work with a Certified Diabetes Educator to assist you with your diabetes management  7) Contact us if you have ANY questions about your medications or instructions, or have problems with getting prescriptions filled.

## 2022-04-05 DIAGNOSIS — F51.01 PRIMARY INSOMNIA: ICD-10-CM

## 2022-04-05 RX ORDER — ZOLPIDEM TARTRATE 10 MG/1
TABLET ORAL
Qty: 30 TABLET | Refills: 2 | Status: SHIPPED | OUTPATIENT
Start: 2022-04-05 | End: 2022-06-22

## 2022-04-05 NOTE — TELEPHONE ENCOUNTER
Routing refill request to provider for review/approval because:  Drug not on the [facility] refill protocol     Hugh Gregorio RN

## 2022-04-28 DIAGNOSIS — K21.00 GASTROESOPHAGEAL REFLUX DISEASE WITH ESOPHAGITIS: ICD-10-CM

## 2022-04-29 RX ORDER — SUCRALFATE 1 G/1
TABLET ORAL
Qty: 360 TABLET | Refills: 3 | Status: SHIPPED | OUTPATIENT
Start: 2022-04-29 | End: 2023-03-13

## 2022-04-29 NOTE — TELEPHONE ENCOUNTER
Prescription approved per [facility] Refill Protocol.    Opal Moran RN on 4/29/2022 at 1:32 PM

## 2022-06-20 ENCOUNTER — MYC MEDICAL ADVICE (OUTPATIENT)
Dept: INTERNAL MEDICINE | Facility: CLINIC | Age: 62
End: 2022-06-20

## 2022-06-20 DIAGNOSIS — F51.01 PRIMARY INSOMNIA: ICD-10-CM

## 2022-06-22 NOTE — TELEPHONE ENCOUNTER
Routing refill request to provider for review/approval because:  Drug not on the [facility] refill protocol     LOV 4/1/22

## 2022-06-23 RX ORDER — ZOLPIDEM TARTRATE 10 MG/1
TABLET ORAL
Qty: 30 TABLET | Refills: 2 | Status: SHIPPED | OUTPATIENT
Start: 2022-06-23 | End: 2022-09-09

## 2022-07-31 ENCOUNTER — HEALTH MAINTENANCE LETTER (OUTPATIENT)
Age: 62
End: 2022-07-31

## 2022-08-24 ENCOUNTER — LAB (OUTPATIENT)
Dept: LAB | Facility: CLINIC | Age: 62
End: 2022-08-24
Payer: COMMERCIAL

## 2022-08-24 DIAGNOSIS — E78.5 HYPERLIPIDEMIA LDL GOAL <130: ICD-10-CM

## 2022-08-24 DIAGNOSIS — I10 ESSENTIAL HYPERTENSION, BENIGN: ICD-10-CM

## 2022-08-24 DIAGNOSIS — E11.9 TYPE 2 DIABETES MELLITUS WITHOUT COMPLICATION, WITHOUT LONG-TERM CURRENT USE OF INSULIN (H): ICD-10-CM

## 2022-08-24 LAB
ALBUMIN SERPL-MCNC: 3.9 G/DL (ref 3.4–5)
ALP SERPL-CCNC: 74 U/L (ref 40–150)
ALT SERPL W P-5'-P-CCNC: 43 U/L (ref 0–70)
ANION GAP SERPL CALCULATED.3IONS-SCNC: 4 MMOL/L (ref 3–14)
AST SERPL W P-5'-P-CCNC: 29 U/L (ref 0–45)
BILIRUB SERPL-MCNC: 0.5 MG/DL (ref 0.2–1.3)
BUN SERPL-MCNC: 18 MG/DL (ref 7–30)
CALCIUM SERPL-MCNC: 9.1 MG/DL (ref 8.5–10.1)
CHLORIDE BLD-SCNC: 104 MMOL/L (ref 94–109)
CHOLEST SERPL-MCNC: 163 MG/DL
CO2 SERPL-SCNC: 27 MMOL/L (ref 20–32)
CREAT SERPL-MCNC: 0.81 MG/DL (ref 0.66–1.25)
CREAT UR-MCNC: 26 MG/DL
ERYTHROCYTE [DISTWIDTH] IN BLOOD BY AUTOMATED COUNT: 13.4 % (ref 10–15)
FASTING STATUS PATIENT QL REPORTED: YES
GFR SERPL CREATININE-BSD FRML MDRD: >90 ML/MIN/1.73M2
GLUCOSE BLD-MCNC: 102 MG/DL (ref 70–99)
HBA1C MFR BLD: 5.6 % (ref 0–5.6)
HCT VFR BLD AUTO: 47 % (ref 40–53)
HDLC SERPL-MCNC: 44 MG/DL
HGB BLD-MCNC: 15.7 G/DL (ref 13.3–17.7)
LDLC SERPL CALC-MCNC: 85 MG/DL
MCH RBC QN AUTO: 30.8 PG (ref 26.5–33)
MCHC RBC AUTO-ENTMCNC: 33.4 G/DL (ref 31.5–36.5)
MCV RBC AUTO: 92 FL (ref 78–100)
MICROALBUMIN UR-MCNC: <5 MG/L
MICROALBUMIN/CREAT UR: NORMAL MG/G{CREAT}
NONHDLC SERPL-MCNC: 119 MG/DL
PLATELET # BLD AUTO: 225 10E3/UL (ref 150–450)
POTASSIUM BLD-SCNC: 3.9 MMOL/L (ref 3.4–5.3)
PROT SERPL-MCNC: 7.1 G/DL (ref 6.8–8.8)
RBC # BLD AUTO: 5.1 10E6/UL (ref 4.4–5.9)
SODIUM SERPL-SCNC: 135 MMOL/L (ref 133–144)
TRIGL SERPL-MCNC: 172 MG/DL
WBC # BLD AUTO: 8 10E3/UL (ref 4–11)

## 2022-08-24 PROCEDURE — 36415 COLL VENOUS BLD VENIPUNCTURE: CPT

## 2022-08-24 PROCEDURE — 82043 UR ALBUMIN QUANTITATIVE: CPT

## 2022-08-24 PROCEDURE — 83036 HEMOGLOBIN GLYCOSYLATED A1C: CPT

## 2022-08-24 PROCEDURE — 80061 LIPID PANEL: CPT

## 2022-08-24 PROCEDURE — 80053 COMPREHEN METABOLIC PANEL: CPT

## 2022-08-24 PROCEDURE — 85027 COMPLETE CBC AUTOMATED: CPT

## 2022-09-09 ENCOUNTER — OFFICE VISIT (OUTPATIENT)
Dept: INTERNAL MEDICINE | Facility: CLINIC | Age: 62
End: 2022-09-09
Payer: COMMERCIAL

## 2022-09-09 VITALS
HEART RATE: 70 BPM | DIASTOLIC BLOOD PRESSURE: 88 MMHG | HEIGHT: 71 IN | OXYGEN SATURATION: 98 % | BODY MASS INDEX: 33.92 KG/M2 | WEIGHT: 242.3 LBS | SYSTOLIC BLOOD PRESSURE: 138 MMHG | TEMPERATURE: 98 F | RESPIRATION RATE: 16 BRPM

## 2022-09-09 DIAGNOSIS — Z23 NEED FOR PNEUMOCOCCAL VACCINATION: ICD-10-CM

## 2022-09-09 DIAGNOSIS — K21.00 GASTROESOPHAGEAL REFLUX DISEASE WITH ESOPHAGITIS WITHOUT HEMORRHAGE: ICD-10-CM

## 2022-09-09 DIAGNOSIS — I10 ESSENTIAL HYPERTENSION, BENIGN: ICD-10-CM

## 2022-09-09 DIAGNOSIS — M19.041 PRIMARY OSTEOARTHRITIS OF BOTH HANDS: ICD-10-CM

## 2022-09-09 DIAGNOSIS — E66.01 SEVERE OBESITY (BMI 35.0-35.9 WITH COMORBIDITY) (H): ICD-10-CM

## 2022-09-09 DIAGNOSIS — E78.5 HYPERLIPIDEMIA LDL GOAL <130: ICD-10-CM

## 2022-09-09 DIAGNOSIS — Z12.5 SCREENING FOR PROSTATE CANCER: ICD-10-CM

## 2022-09-09 DIAGNOSIS — Z23 NEED FOR PROPHYLACTIC VACCINATION AND INOCULATION AGAINST INFLUENZA: ICD-10-CM

## 2022-09-09 DIAGNOSIS — E11.9 TYPE 2 DIABETES MELLITUS WITHOUT COMPLICATION, WITHOUT LONG-TERM CURRENT USE OF INSULIN (H): Primary | ICD-10-CM

## 2022-09-09 DIAGNOSIS — M19.042 PRIMARY OSTEOARTHRITIS OF BOTH HANDS: ICD-10-CM

## 2022-09-09 DIAGNOSIS — F51.01 PRIMARY INSOMNIA: ICD-10-CM

## 2022-09-09 DIAGNOSIS — R73.03 PREDIABETES: ICD-10-CM

## 2022-09-09 PROCEDURE — 90471 IMMUNIZATION ADMIN: CPT | Performed by: INTERNAL MEDICINE

## 2022-09-09 PROCEDURE — 90472 IMMUNIZATION ADMIN EACH ADD: CPT | Performed by: INTERNAL MEDICINE

## 2022-09-09 PROCEDURE — 90682 RIV4 VACC RECOMBINANT DNA IM: CPT | Performed by: INTERNAL MEDICINE

## 2022-09-09 PROCEDURE — 99214 OFFICE O/P EST MOD 30 MIN: CPT | Mod: 25 | Performed by: INTERNAL MEDICINE

## 2022-09-09 PROCEDURE — 90677 PCV20 VACCINE IM: CPT | Performed by: INTERNAL MEDICINE

## 2022-09-09 RX ORDER — LISINOPRIL AND HYDROCHLOROTHIAZIDE 12.5; 2 MG/1; MG/1
2 TABLET ORAL EVERY MORNING
Qty: 180 TABLET | Refills: 3 | Status: SHIPPED | OUTPATIENT
Start: 2022-09-09 | End: 2023-09-01

## 2022-09-09 RX ORDER — PRAVASTATIN SODIUM 40 MG
40 TABLET ORAL DAILY
Qty: 90 TABLET | Refills: 3 | Status: SHIPPED | OUTPATIENT
Start: 2022-09-09 | End: 2023-09-01

## 2022-09-09 RX ORDER — METOPROLOL SUCCINATE 25 MG/1
25 TABLET, EXTENDED RELEASE ORAL DAILY
Qty: 90 TABLET | Refills: 3 | Status: SHIPPED | OUTPATIENT
Start: 2022-09-09 | End: 2023-07-18

## 2022-09-09 RX ORDER — METFORMIN HCL 500 MG
500 TABLET, EXTENDED RELEASE 24 HR ORAL
Qty: 90 TABLET | Refills: 1 | Status: SHIPPED | OUTPATIENT
Start: 2022-09-09 | End: 2023-03-13

## 2022-09-09 RX ORDER — CELECOXIB 200 MG/1
200 CAPSULE ORAL 2 TIMES DAILY PRN
Qty: 180 CAPSULE | Refills: 3 | Status: SHIPPED | OUTPATIENT
Start: 2022-09-09 | End: 2023-09-01

## 2022-09-09 RX ORDER — ZOLPIDEM TARTRATE 10 MG/1
TABLET ORAL
Qty: 30 TABLET | Refills: 5 | Status: SHIPPED | OUTPATIENT
Start: 2022-09-09 | End: 2023-03-02

## 2022-09-09 RX ORDER — FLASH GLUCOSE SCANNING READER
EACH MISCELLANEOUS
COMMUNITY
Start: 2021-12-08 | End: 2022-12-03

## 2022-09-09 ASSESSMENT — PAIN SCALES - GENERAL: PAINLEVEL: MODERATE PAIN (4)

## 2022-09-09 NOTE — PATIENT INSTRUCTIONS
Excellent control of the diabetes.      Continue all of your efforts, they are working very well.      Annual influenza vaccine given today     Pneumonia vaccine given today, you may need another one at age 65     Get the updated covid booster vaccine when available in a coupel of weeks  You can get this from any pharmacy or [facility]    [facility] Covid Scheduling number: [phone]   (to arrange Covid testing and/or Covid vaccine appointments)       *-  Return to see me in approximately 6 months, sooner if needed.  Please get nonfasting labs done in the [facility] or at any other [facility] Lab lab 1-2 days before this appointment.  If you get the labs done at another clinic, make arrangements with that clinic directly.  The orders will be in place.  Use [employer] or Call [phone] to schedule the appointment with me and lab appointment.           5 GOALS IN MANAGING DIABETES (i.e. to give the best chance to prevent diabetic complications and vascular disease):     1.  Aggressive diabetic management.  The target for A1C (3 months average blood sugar) is ideally below 6.5, preferably below 7.5    Your diabetes is under good control.      Lab Results   Component Value Date    A1C 5.6 08/24/2022    A1C 5.8 03/25/2022    A1C 8.9 12/08/2021    A1C 6.3 08/18/2020    A1C 6.0 08/30/2019    A1C 6.0 03/05/2019    A1C 5.9 10/30/2014    A1C 5.7 09/23/2013    A1C 6.1 09/26/2012    A1C 6.2 09/28/2009    A1C 5.9 04/08/2008    A1C 5.3 09/27/2007       2.  Aggressive blood pressure control, under 130/80 ideally.  Using medications if needed.    Your blood pressure is under good control    BP Readings from Last 4 Encounters:   09/09/22 (!) 140/90   04/01/22 124/72   12/08/21 126/78   04/12/21 (!) 160/110       3.  Aggressive LDL cholesterol (bad cholesterol) lowering as needed.  Your goal is an LDL under 100 for sure, preferably under 70.     *  All patients with diabetes are recommended to be on a  "\"statin\" cholesterol lowering medication regardless of the cholesterol levels to give the best chance at avoiding vascular disease.      New guidelines identify four high-risk groups who could benefit from statins:   *people with pre-existing heart disease, such as those who have had a heart attack;   *people ages 40 to 75 who have diabetes of any type  *patients ages 40 to 75 with at least a 7.5% risk of developing cardiovascular disease over the next decade, according to a formula described in the guidelines  *patients with the sort of super-high cholesterol that sometimes runs in families, as evidenced by an LDL of 190 milligrams per deciliter or higher    *  Your cholesterol levels are well controlled.    Recent Labs   Lab Test 08/24/22  0819 12/08/21  1454 01/27/16  0746 10/30/14  0751   CHOL 163 202*   < > 220*   HDL 44 48   < > 43   LDL 85 106*   < > 139*   TRIG 172* 241*   < > 192*   CHOLHDLRATIO  --   --   --  5.1*    < > = values in this interval not displayed.       4.  No smoking    5.  Consider daily preventative Aspirin once per day, every day over age 50 unless there is a specific reason that you cannot take aspirin.       *You should take Aspirin 81 mg once per day, unless you have a reason NOT to take aspirin (i.e. side effect, excessive bruising or bleeding, taking another \"blood tinning\" medication, etc.)       DIABETES REMINDERS:   1. Check your blood glucose regularly either with standard glucometer or personal continuous glucose monitor.    2) Your blood sugar goals:  before eating and  two hours after eating.  If using personal continuous glucose monitor, the goal is Time in the Target range ( ) of 70-75% with very few (under 2%) Below target.    3) Always be prepared to treat low blood sugar symptoms should it happen. Keep a sugar-containing beverage or food nearby.  4) When to call your clinic:     Blood sugar over 400     If you have 2 to 3 low blood sugars (under 70) in a " row    Low readings the same time of day several days in a row  5) When to call 911: If your low blood glucose symptoms do not get better with treatment, or if you/someone else is unable to give you treatment.  6) Work with a Certified Diabetes Educator to assist you with your diabetes management  7) Contact us if you have ANY questions about your medications or instructions, or have problems with getting prescriptions filled.

## 2022-09-09 NOTE — PROGRESS NOTES
Assessment & Plan     (E11.9) Type 2 diabetes mellitus without complication, without long-term current use of insulin (H)  (primary encounter diagnosis)  Comment: Has done amazingly well with improving his diabetes which just diet predominantly.  Still taking metformin tablet once daily.  The personal continuous glucose monitor has been invaluable in achieving this excellent control of his diabetes and I strongly recommended that he continue to do this as having the constant feedback regarding diet and lifestyle changes has been extremely effective in reminding him.  Discussed importance of aggressive management of diabetes, including aggressive low cabr diet (one of the most powerful ways to control type II DM), performing regular glucose monitoring, (either with standard glucometer, or preferably personal continuous glucose monitor), medication compliance, regular laboratory monitoring at least every 6 months (or possibly more often if diabetes not at goal), and attending regular follow up appointments as ordered.    Aggressive diabetes management of diabetes will greatly reduce the future risk of diabetes related complications such as CAD, CVA, PVD, and retinopathy/neuropathy/nephropathy.  Based on level of diabetes control: testing frequency ONE TIME PER DAY   Plan: metFORMIN (GLUCOPHAGE XR) 500 MG 24 hr tablet,         Hemoglobin A1c, Basic metabolic panel, REVIEW         OF HEALTH MAINTENANCE PROTOCOL ORDERS            (R73.03) Prediabetes  Comment:   Plan: pravastatin (PRAVACHOL) 40 MG tablet, REVIEW OF        HEALTH MAINTENANCE PROTOCOL ORDERS            (E78.5) Hyperlipidemia LDL goal <130  Comment: This condition is currently controlled on the current medical regimen.  Continue current therapy.   Discussed guidelines recommending a statin cholesterol lowering medication for any patient with either diabetes and/or vascular disease, aiming for a LDL goal under 100 for sure, ideally under 70, using whatever  dose of statin tolerated.    Plan: pravastatin (PRAVACHOL) 40 MG tablet, REVIEW OF        HEALTH MAINTENANCE PROTOCOL ORDERS            (I10) Essential hypertension, benign  Comment: This condition is currently controlled on the current medical regimen.  Continue current therapy.   Plan: lisinopril-hydrochlorothiazide (ZESTORETIC)         20-12.5 MG tablet, metoprolol succinate ER         (TOPROL XL) 25 MG 24 hr tablet, Basic metabolic        panel, REVIEW OF HEALTH MAINTENANCE PROTOCOL         ORDERS            (F51.01) Primary insomnia  Comment: This condition is currently controlled on the current medical regimen.  Continue current therapy.   Reviewed side effects of zolpidem, he wishes to continue it.  Plan: REVIEW OF HEALTH MAINTENANCE PROTOCOL ORDERS,         zolpidem (AMBIEN) 10 MG tablet            (M19.041,  M19.042) Primary osteoarthritis of both hands  Comment: This condition is currently controlled on the current medical regimen.  Continue current therapy.    arthritis in most of his joints are much better with the weight loss.  Plan: celecoxib (CELEBREX) 200 MG capsule, REVIEW OF         HEALTH MAINTENANCE PROTOCOL ORDERS            (Z12.5) Screening for prostate cancer  Comment: Future lab only  Plan: Prostate Specific Antigen Screen, REVIEW OF         HEALTH MAINTENANCE PROTOCOL ORDERS            (Z23) Need for pneumococcal vaccination  Comment:   Plan: Pneumococcal 20 Valent Conjugate (Prevnar 20)            (Z23) Need for prophylactic vaccination and inoculation against influenza  Comment:   Plan: INFLUENZA QUAD, RECOMBINANT, P-FREE (RIV4)         (FLUBLOK)            (E66.01,  Z68.35) Severe obesity (BMI 35.0-35.9 with comorbidity) (H)  Comment: Obesity contributing to diabetes, hypertension and osteoarthritis.  He is on an amazing job at losing weight and inches with mostly lifestyle changes focused around better eating habits.  Continue low-carb diet exercise as tolerated.  Plan:     (K21.00)  "Gastroesophageal reflux disease with esophagitis without hemorrhage  Comment: Reflux is virtually disappeared with changes in his diet.  Plan:                 BMI:   Estimated body mass index is 33.79 kg/m  as calculated from the following:    Height as of this encounter: 1.803 m (5' 11\").    Weight as of this encounter: 109.9 kg (242 lb 4.8 oz).           Return in about 6 months (around 3/9/2023) for Blood pressure, Diabetes, with pre-visit non-fasting labs.    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  He is checking home blood glucose two times daily. He checks blood glucose before and after meals.  Blood glucose is never over 200 and never under 70. When his blood glucose is low, the patient is asymptomatic for confusion, blurred vision, lethargy and reports not feeling dizzy, shaky, or weak.  He has no concerns regarding his diabetes at this time.  He is not experiencing numbness or burning in feet, excessive thirst, blurry vision, weight changes or redness, sores or blisters on feet. The patient has not had a diabetic eye exam in the last 12 months.         He eats 4 or more servings of fruits and vegetables daily.He consumes 0 sweetened beverage(s) daily.He exercises with enough effort to increase his heart rate 9 or less minutes per day.  He exercises with enough effort to increase his heart rate 3 or less days per week.   He is taking medications regularly.     1.  Diabetes:  In regards specifically to the patient's diabetes, they reports no unusual symptoms.  Medication compliance: good  Diabetic diet compliance: good    Patient reports no significant episodes of hypo- or hyperglycemia    Diabetic complications: none     Most  recent labs:    Lab Results   Component Value " Date    A1C 5.6 08/24/2022    A1C 5.8 03/25/2022    A1C 8.9 12/08/2021    A1C 6.3 08/18/2020    A1C 6.0 08/30/2019    A1C 6.0 03/05/2019    A1C 5.9 10/30/2014    A1C 5.7 09/23/2013    A1C 6.1 09/26/2012    A1C 6.2 09/28/2009    A1C 5.9 04/08/2008    A1C 5.3 09/27/2007            2.    Hypertension:  History of hypertension, on medication.  No reported side effects from medications.    Reviewed last 6 BP readings in chart:  BP Readings from Last 6 Encounters:   09/09/22 138/88   04/01/22 124/72   12/08/21 126/78   04/12/21 (!) 160/110   08/20/20 (!) 144/92   10/28/19 (!) 194/116     No active cardiac complaints or symptoms with exercise.       3.    The patient has had a history of ongoing obesity.  Reviewed the weigth curves.   Their current BMI is:  Body mass index is 33.79 kg/m .  Reviewed previous attempts at weight loss which have not been successful in producing prolonged weigth loss.   Discussed current eating and exercise habits.     Reviewed weights in chart:  Wt Readings from Last 10 Encounters:   09/09/22 109.9 kg (242 lb 4.8 oz)   04/01/22 112 kg (247 lb)   12/08/21 127.9 kg (282 lb)   04/12/21 134.4 kg (296 lb 6.4 oz)   08/20/20 127.2 kg (280 lb 8 oz)   10/28/19 127 kg (280 lb)   10/28/19 127.9 kg (281 lb 14.4 oz)   10/26/19 127 kg (280 lb)   09/04/19 125.2 kg (276 lb)   03/14/19 117.9 kg (260 lb)        4.  Still uses zolpidem on a regular basis to help sleep.  Especially hard since the death of his wife last year.  No side effects with the use of this medicine.      5.  Previous reflux has virtually disappeared with a significant change in his diet over the last year.  No longer requires medicines.        **I reviewed the information recorded in the patient's EPIC chart (including but not limited to medical history, surgical history, family history, problem list, medication list, and allergy list) and updated the information as indicated based on the patients reported information.         Review of  "Systems   Constitutional, HEENT, cardiovascular, pulmonary, gi and gu systems are negative, except as otherwise noted.      Objective    /88   Pulse 70   Temp 98  F (36.7  C) (Tympanic)   Resp 16   Ht 1.803 m (5' 11\")   Wt 109.9 kg (242 lb 4.8 oz)   SpO2 98%   BMI 33.79 kg/m    Body mass index is 33.79 kg/m .  Physical Exam   GENERAL alert and no distress  EYES:  Normal sclera,conjunctiva, EOMI  HENT: oral and posterior pharynx without lesions or erythema, facies symmetric  NECK: Neck supple. No LAD, without thyroidmegaly.  RESP: Clear to ausculation bilaterally without wheezes or crackles. Normal BS in all fields.  CV: RRR normal S1S2 without murmurs, rubs or gallops.  LYMPH: no cervical lymph adenopathy appreciated  MS: extremities- no gross deformities of the visible extremities noted,   EXT:  no lower extremity edema  PSYCH: Alert and oriented times 3; speech- coherent  SKIN:  No obvious significant skin lesions on visible portions of face                     "

## 2022-10-12 ENCOUNTER — TRANSFERRED RECORDS (OUTPATIENT)
Dept: HEALTH INFORMATION MANAGEMENT | Facility: CLINIC | Age: 62
End: 2022-10-12

## 2022-12-03 ENCOUNTER — HEALTH MAINTENANCE LETTER (OUTPATIENT)
Age: 62
End: 2022-12-03

## 2022-12-06 ENCOUNTER — MYC REFILL (OUTPATIENT)
Dept: INTERNAL MEDICINE | Facility: CLINIC | Age: 62
End: 2022-12-06

## 2022-12-06 DIAGNOSIS — E11.9 TYPE 2 DIABETES MELLITUS WITHOUT COMPLICATION, WITHOUT LONG-TERM CURRENT USE OF INSULIN (H): ICD-10-CM

## 2022-12-12 ENCOUNTER — MYC MEDICAL ADVICE (OUTPATIENT)
Dept: INTERNAL MEDICINE | Facility: CLINIC | Age: 62
End: 2022-12-12

## 2022-12-12 RX ORDER — FLASH GLUCOSE SCANNING READER
EACH MISCELLANEOUS
Qty: 1 EACH | Refills: 0 | Status: SHIPPED | OUTPATIENT
Start: 2022-12-12

## 2023-03-02 ENCOUNTER — MYC MEDICAL ADVICE (OUTPATIENT)
Dept: INTERNAL MEDICINE | Facility: CLINIC | Age: 63
End: 2023-03-02
Payer: COMMERCIAL

## 2023-03-02 DIAGNOSIS — F51.01 PRIMARY INSOMNIA: ICD-10-CM

## 2023-03-02 RX ORDER — ZOLPIDEM TARTRATE 10 MG/1
TABLET ORAL
Qty: 30 TABLET | Refills: 2 | Status: SHIPPED | OUTPATIENT
Start: 2023-03-02 | End: 2023-05-29

## 2023-03-10 ENCOUNTER — LAB (OUTPATIENT)
Dept: LAB | Facility: CLINIC | Age: 63
End: 2023-03-10
Payer: COMMERCIAL

## 2023-03-10 DIAGNOSIS — Z12.5 SCREENING FOR PROSTATE CANCER: ICD-10-CM

## 2023-03-10 DIAGNOSIS — I10 ESSENTIAL HYPERTENSION, BENIGN: ICD-10-CM

## 2023-03-10 DIAGNOSIS — E11.9 TYPE 2 DIABETES MELLITUS WITHOUT COMPLICATION, WITHOUT LONG-TERM CURRENT USE OF INSULIN (H): ICD-10-CM

## 2023-03-10 LAB
ANION GAP SERPL CALCULATED.3IONS-SCNC: 12 MMOL/L (ref 7–15)
BUN SERPL-MCNC: 20.6 MG/DL (ref 8–23)
CALCIUM SERPL-MCNC: 9.2 MG/DL (ref 8.8–10.2)
CHLORIDE SERPL-SCNC: 101 MMOL/L (ref 98–107)
CREAT SERPL-MCNC: 0.76 MG/DL (ref 0.67–1.17)
DEPRECATED HCO3 PLAS-SCNC: 24 MMOL/L (ref 22–29)
GFR SERPL CREATININE-BSD FRML MDRD: >90 ML/MIN/1.73M2
GLUCOSE SERPL-MCNC: 102 MG/DL (ref 70–99)
HBA1C MFR BLD: 5.9 % (ref 0–5.6)
POTASSIUM SERPL-SCNC: 4.2 MMOL/L (ref 3.4–5.3)
PSA SERPL-MCNC: 0.51 NG/ML (ref 0–4.5)
SODIUM SERPL-SCNC: 137 MMOL/L (ref 136–145)

## 2023-03-10 PROCEDURE — 36415 COLL VENOUS BLD VENIPUNCTURE: CPT

## 2023-03-10 PROCEDURE — 83036 HEMOGLOBIN GLYCOSYLATED A1C: CPT

## 2023-03-10 PROCEDURE — G0103 PSA SCREENING: HCPCS

## 2023-03-10 PROCEDURE — 80048 BASIC METABOLIC PNL TOTAL CA: CPT

## 2023-03-12 DIAGNOSIS — E11.9 TYPE 2 DIABETES MELLITUS WITHOUT COMPLICATION, WITHOUT LONG-TERM CURRENT USE OF INSULIN (H): ICD-10-CM

## 2023-03-12 DIAGNOSIS — K21.9 GASTROESOPHAGEAL REFLUX DISEASE WITHOUT ESOPHAGITIS: ICD-10-CM

## 2023-03-12 DIAGNOSIS — K21.00 GASTROESOPHAGEAL REFLUX DISEASE WITH ESOPHAGITIS: ICD-10-CM

## 2023-03-13 RX ORDER — METFORMIN HCL 500 MG
TABLET, EXTENDED RELEASE 24 HR ORAL
Qty: 90 TABLET | Refills: 0 | Status: SHIPPED | OUTPATIENT
Start: 2023-03-13 | End: 2023-03-24

## 2023-03-13 RX ORDER — SUCRALFATE 1 G/1
TABLET ORAL
Qty: 360 TABLET | Refills: 0 | Status: SHIPPED | OUTPATIENT
Start: 2023-03-13 | End: 2023-07-27

## 2023-03-13 RX ORDER — OMEPRAZOLE 40 MG/1
CAPSULE, DELAYED RELEASE ORAL
Qty: 90 CAPSULE | Refills: 0 | Status: SHIPPED | OUTPATIENT
Start: 2023-03-13 | End: 2023-03-24

## 2023-03-24 ENCOUNTER — OFFICE VISIT (OUTPATIENT)
Dept: INTERNAL MEDICINE | Facility: CLINIC | Age: 63
End: 2023-03-24
Payer: COMMERCIAL

## 2023-03-24 VITALS
HEART RATE: 80 BPM | SYSTOLIC BLOOD PRESSURE: 124 MMHG | HEIGHT: 71 IN | OXYGEN SATURATION: 99 % | DIASTOLIC BLOOD PRESSURE: 82 MMHG | TEMPERATURE: 98.1 F | WEIGHT: 247.7 LBS | BODY MASS INDEX: 34.68 KG/M2

## 2023-03-24 DIAGNOSIS — M19.042 PRIMARY OSTEOARTHRITIS OF BOTH HANDS: ICD-10-CM

## 2023-03-24 DIAGNOSIS — E11.9 TYPE 2 DIABETES MELLITUS WITHOUT COMPLICATION, WITHOUT LONG-TERM CURRENT USE OF INSULIN (H): Primary | ICD-10-CM

## 2023-03-24 DIAGNOSIS — Z23 HIGH PRIORITY FOR 2019-NCOV VACCINE: ICD-10-CM

## 2023-03-24 DIAGNOSIS — E66.01 SEVERE OBESITY (BMI 35.0-35.9 WITH COMORBIDITY) (H): ICD-10-CM

## 2023-03-24 DIAGNOSIS — K21.9 GASTROESOPHAGEAL REFLUX DISEASE WITHOUT ESOPHAGITIS: ICD-10-CM

## 2023-03-24 DIAGNOSIS — Z12.11 SCREEN FOR COLON CANCER: ICD-10-CM

## 2023-03-24 DIAGNOSIS — R73.03 PREDIABETES: ICD-10-CM

## 2023-03-24 DIAGNOSIS — E78.5 HYPERLIPIDEMIA LDL GOAL <130: ICD-10-CM

## 2023-03-24 DIAGNOSIS — M19.041 PRIMARY OSTEOARTHRITIS OF BOTH HANDS: ICD-10-CM

## 2023-03-24 DIAGNOSIS — F51.01 PRIMARY INSOMNIA: ICD-10-CM

## 2023-03-24 DIAGNOSIS — I10 ESSENTIAL HYPERTENSION, BENIGN: ICD-10-CM

## 2023-03-24 DIAGNOSIS — Z23 NEED FOR TDAP VACCINATION: ICD-10-CM

## 2023-03-24 DIAGNOSIS — K21.00 GASTROESOPHAGEAL REFLUX DISEASE WITH ESOPHAGITIS WITHOUT HEMORRHAGE: ICD-10-CM

## 2023-03-24 PROCEDURE — 90715 TDAP VACCINE 7 YRS/> IM: CPT | Performed by: INTERNAL MEDICINE

## 2023-03-24 PROCEDURE — 91312 COVID-19 VACCINE BIVALENT BOOSTER 12+ (PFIZER): CPT | Performed by: INTERNAL MEDICINE

## 2023-03-24 PROCEDURE — 90471 IMMUNIZATION ADMIN: CPT | Performed by: INTERNAL MEDICINE

## 2023-03-24 PROCEDURE — 99214 OFFICE O/P EST MOD 30 MIN: CPT | Mod: 25 | Performed by: INTERNAL MEDICINE

## 2023-03-24 PROCEDURE — 0124A COVID-19 VACCINE BIVALENT BOOSTER 12+ (PFIZER): CPT | Performed by: INTERNAL MEDICINE

## 2023-03-24 RX ORDER — METFORMIN HCL 500 MG
500 TABLET, EXTENDED RELEASE 24 HR ORAL
Qty: 90 TABLET | Refills: 1 | Status: SHIPPED | OUTPATIENT
Start: 2023-03-24 | End: 2023-09-01

## 2023-03-24 RX ORDER — OMEPRAZOLE 40 MG/1
40 CAPSULE, DELAYED RELEASE ORAL DAILY
Qty: 90 CAPSULE | Refills: 0 | Status: SHIPPED | OUTPATIENT
Start: 2023-03-24 | End: 2023-09-01

## 2023-03-24 NOTE — PROGRESS NOTES
Assessment & Plan     (E11.9) Type 2 diabetes mellitus without complication, without long-term current use of insulin (H)  (primary encounter diagnosis)  Comment: Continues to do very well predominately with diet and lifestyle changes.  He feels very confident in his ability to continue these lower carbohydrate diet.  Continue low-dose metformin once daily.  Referral to eye doctor annually.  Plan: Adult Eye  Referral, metFORMIN         (GLUCOPHAGE XR) 500 MG 24 hr tablet, Lipid         panel reflex to direct LDL Fasting, Hemoglobin         A1c, Basic metabolic panel, CBC with platelets,        Albumin Random Urine Quantitative with Creat         Ratio, ALT            (E66.01,  Z68.35) Severe obesity (BMI 35.0-35.9 with comorbidity) (H)  Comment: The obesity is contributing to diabetes and hypertension.  Current BMI is: Body mass index is 34.55 kg/m ..  Reviewed weight patterns.   Obesity as a biological preventable and treatable disease, which is associated with significantly increased risk of many acute and chronic health conditions.   Obesity has now been recognized as a chronic disease by the American Medical Association.  Obesity has serious co-morbidities associated with obesity including increased risk for hypertension, stroke, coronary artery disease, dyslipidemia, Type II diabetes, depression, sleep apnea, cancers of the colon, breast and endometrium, obstructive sleep apnea, osteoarthritis and female infertility.  Recommended regular aerobic exercise, recommended improved diet aiming at lowering amount of processed foods, lower sugars, moderation/reduction of simple carbs and fat in the diet, establishing more regular meal times, always eating breakfast, front loading some of the calories and adding more protein to the diet.          Plan:     (I10) Essential hypertension, benign  Comment: This condition is currently controlled on the current medical regimen.  Continue current therapy.   Plan:  Lipid panel reflex to direct LDL Fasting,         Hemoglobin A1c, Basic metabolic panel, CBC with        platelets, Albumin Random Urine Quantitative         with Creat Ratio            (K21.9) Gastroesophageal reflux disease without esophagitis  Comment: This condition is currently controlled on the current medical regimen.  Continue current therapy.   Plan: omeprazole (PRILOSEC) 40 MG DR capsule            (R73.03) Prediabetes  Comment:   Plan:     (E78.5) Hyperlipidemia LDL goal <130  Comment: This condition is currently controlled on the current medical regimen.  Continue current therapy.   Discussed guidelines recommending a statin cholesterol lowering medication for any patient with either diabetes and/or vascular disease, aiming for a LDL goal under 100 for sure, ideally under 70, using whatever dose of statin tolerated.    Plan: Lipid panel reflex to direct LDL Fasting, ALT            (K21.00) Gastroesophageal reflux disease with esophagitis without hemorrhage  Comment: This condition is currently controlled on the current medical regimen.  Continue current therapy.   Plan:     (F51.01) Primary insomnia  Comment: This condition is currently controlled on the current medical regimen.  Continue current therapy.   Plan:     (M19.041,  M19.042) Primary osteoarthritis of both hands  Comment: This condition is currently controlled on the current medical regimen.  Continue current therapy.   Plan:     (Z23) Need for Tdap vaccination  Comment:   Plan: TDAP VACCINE (Adacel, Boostrix)            (Z23) High priority for 2019-nCoV vaccine  Comment:   Plan: COVID-19,PF,PFIZER BOOSTER BIVALENT 12+Yrs            (Z12.11) Screen for colon cancer  Comment: I discussed current recommendations for routine colon cancer screening starting at age 45 (age 35 for family history of colon cancer).  Recommending colonoscopy as gold standard test, but the patient is declining to do colonoscopy at this time.   Discussed ColoGuard stool  "test for routine colon cancer screening, and will order it if covered by their insurance.   If result Negative, repeat ColoGuard testing every 3 years (or eventually consider colonoscopy)  If result Positive, does not necessarily mean colon cancer, but means they need colonoscopy.    Plan: COLOGUARD(EXACT SCIENCES)                        BMI:   Estimated body mass index is 34.55 kg/m  as calculated from the following:    Height as of this encounter: 1.803 m (5' 11\").    Weight as of this encounter: 112.4 kg (247 lb 11.2 oz).           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  He is not checking blood glucose. He has no concerns regarding his diabetes at this time.  He is not experiencing numbness or burning in feet, excessive thirst, blurry vision, weight changes or redness, sores or blisters on feet. The patient has not had a diabetic eye exam in the last 12 months.         He eats 2-3 servings of fruits and vegetables daily.He consumes 0 sweetened beverage(s) daily.He exercises with enough effort to increase his heart rate 30 to 60 minutes per day.  He exercises with enough effort to increase his heart rate 5 days per week.   He is taking medications regularly.     Hyperlipidemia Follow-Up      Are you regularly taking any medication or supplement to lower your cholesterol?   Yes- Pravastatin    Are you having muscle aches or other side effects that you think could be caused by your cholesterol lowering medication?  No    Hypertension Follow-up      Do you check your blood pressure regularly outside of the clinic? No     Are you following a low salt diet? Yes    Are your blood pressures ever more than 140 on the top number (systolic) OR more   than " "90 on the bottom number (diastolic), for example 140/90? Not checking    The patient has had a history of ongoing obesity.  Reviewed the weigth curves.   Their current BMI is:  Body mass index is 34.55 kg/m .  Reviewed previous attempts at weight loss which have not been successful in producing prolonged weigth loss.   Discussed current eating and exercise habits.     Reviewed weights in chart:  Wt Readings from Last 10 Encounters:   03/24/23 112.4 kg (247 lb 11.2 oz)   09/09/22 109.9 kg (242 lb 4.8 oz)   04/01/22 112 kg (247 lb)   12/08/21 127.9 kg (282 lb)   04/12/21 134.4 kg (296 lb 6.4 oz)   08/20/20 127.2 kg (280 lb 8 oz)   10/28/19 127 kg (280 lb)   10/28/19 127.9 kg (281 lb 14.4 oz)   10/26/19 127 kg (280 lb)   09/04/19 125.2 kg (276 lb)          Review of Systems   Constitutional, HEENT, cardiovascular, pulmonary, gi and gu systems are negative, except as otherwise noted.      Objective    /82   Pulse 80   Temp 98.1  F (36.7  C) (Oral)   Ht 1.803 m (5' 11\")   Wt 112.4 kg (247 lb 11.2 oz)   SpO2 99%   BMI 34.55 kg/m    Body mass index is 34.55 kg/m .  Physical Exam   GENERAL alert and no distress  EYES:  Normal sclera,conjunctiva, EOMI  HENT: facies symmetric  MS: extremities- no gross deformities of the visible extremities noted,   PSYCH: Alert and oriented times 3; speech- coherent  SKIN:  No obvious significant skin lesions on visible portions of face   NEURO:  Normal facial movements.  Appears to move normally                     "

## 2023-03-24 NOTE — PATIENT INSTRUCTIONS
" Continue all medications at the same doses.  Contact your usual pharmacy if you need refills.      Tetanus booster given today (next due in 10 yearse)    covid booster given today     Ask the pharmacist about Shingrix shingles vaccine.      Return to see me in 6 months, sooner if needed.  Please get fasting labs done at the [facility] or any other [facility] lab 1-2 days before this appointment (schedule a \"lab appointment\").  If you get the labs done at another clinic, make arrangements with them directly.  The orders will be in place.  Eat nothing for at least 8 hours prior to having these labs drawn.  Use [employer] ePrint or Call [phone] to schedule the appointment with me and lab appointment.         ColoGuard Colon cancer screening:     *  The ColoGuard stool test is good noninvasive way to screen for colon cancer, in average risk individuals.      *  You should NOT use ColoGuard for colon cancer screening if you have a family history of colon cancer or if you have a history of pre-cancerous polyps, you should have a colonoscopy if you have either of these situations.      *  The ColoGuard collection kit will be mailed to your home address by the [employer].  Follow the instructions for collecting and returning the tablet.      *  I will inform you about the results.     *  If the ColoGuard test is Negative, then no colon cancer screening needed for 3 years (would consider either repeating the ColoGuard test or else doing a colonoscopy).     *  If the ColoGuard test is Positive, this does NOT mean that you have colon cacner, it simply means that you will need to have a colonoscopy.  Most of the time, pre-cancerous polyps can turn this test positive.          Shingles Vaccine (SHINGRIX):      I would recommend that you consider getting the Shingrix shingles vaccine.  The shingles vaccine is recommended for anyone over age 50.     The Shingrix vaccine is a series of 2 vaccines given 2-6 " "months apart.     The shingles vaccine does not stop you from getting shingles, but it decreases the intensity of the event, the duration of the event, and decreases the painful nerve condition that results     Based on the available data, the Shingrix vaccine has superior benefit and should be considered even if you have had the old Zostavax shinglesvaccine before.      Contact your insurance provider and ask them if either shingles vaccine is covered and is so, how much it will cost you.  Usually this will be cheaper to get in a pharmacy given by the pharmacist.    Regardless of the coverage, I would recommend that you consider the vaccine since shingles is very painful, (just ask anyone who has ever had it)    For Medicare insurance, the vaccine is cheaper to receive from a pharmacist in a pharmacy than for us to give you in the clinic.          5 GOALS IN MANAGING DIABETES (i.e. to give the best chance to prevent diabetic complications and vascular disease):     1.  Aggressive diabetic management.  The target for A1C (3 months average blood sugar) is ideally below 6.5, preferably below 7.5    Your diabetes is under good control.      Lab Results   Component Value Date    A1C 5.9 03/10/2023    A1C 5.6 08/24/2022    A1C 5.8 03/25/2022    A1C 8.9 12/08/2021    A1C 6.3 08/18/2020    A1C 6.0 08/30/2019    A1C 6.0 03/05/2019    A1C 5.9 10/30/2014    A1C 5.7 09/23/2013    A1C 6.1 09/26/2012    A1C 6.2 09/28/2009    A1C 5.9 04/08/2008    A1C 5.3 09/27/2007       2.  Aggressive blood pressure control, under 130/80 ideally.  Using medications if needed.    Your blood pressure is under good control    BP Readings from Last 4 Encounters:   03/24/23 124/82   09/09/22 138/88   04/01/22 124/72   12/08/21 126/78       3.  Aggressive LDL cholesterol (bad cholesterol) lowering as needed.  Your goal is an LDL under 100 for sure, preferably under 70.     *  All patients with diabetes are recommended to be on a \"statin\" cholesterol " "lowering medication regardless of the cholesterol levels to give the best chance at avoiding vascular disease.      New guidelines identify four high-risk groups who could benefit from statins:   *people with pre-existing heart disease, such as those who have had a heart attack;   *people ages 40 to 75 who have diabetes of any type  *patients ages 40 to 75 with at least a 7.5% risk of developing cardiovascular disease over the next decade, according to a formula described in the guidelines  *patients with the sort of super-high cholesterol that sometimes runs in families, as evidenced by an LDL of 190 milligrams per deciliter or higher    *  Your cholesterol levels are well controlled.    Recent Labs   Lab Test 08/24/22  0819 12/08/21  1454   CHOL 163 202*   HDL 44 48   LDL 85 106*   TRIG 172* 241*       4.  No smoking    5.  Consider daily preventative Aspirin once per day, every day over age 50 unless there is a specific reason that you cannot take aspirin.       *You should take Aspirin 81 mg once per day, unless you have a reason NOT to take aspirin (i.e. side effect, excessive bruising or bleeding, taking another \"blood tinning\" medication, etc.)       DIABETES REMINDERS:   1. Check your blood glucose regularly either with standard glucometer or personal continuous glucose monitor.    2) Your blood sugar goals:  before eating and  two hours after eating.  If using personal continuous glucose monitor, the goal is Time in the Target range ( ) of 70-75% with very few (under 2%) Below target.    3) Always be prepared to treat low blood sugar symptoms should it happen. Keep a sugar-containing beverage or food nearby.  4) When to call your clinic:     Blood sugar over 400     If you have 2 to 3 low blood sugars (under 70) in a row    Low readings the same time of day several days in a row  5) When to call 911: If your low blood glucose symptoms do not get better with treatment, or if you/someone " else is unable to give you treatment.  6) Work with a Certified Diabetes Educator to assist you with your diabetes management  7) Contact us if you have ANY questions about your medications or instructions, or have problems with getting prescriptions filled.

## 2023-03-26 ENCOUNTER — HEALTH MAINTENANCE LETTER (OUTPATIENT)
Age: 63
End: 2023-03-26

## 2023-04-05 ENCOUNTER — TRANSFERRED RECORDS (OUTPATIENT)
Dept: HEALTH INFORMATION MANAGEMENT | Facility: CLINIC | Age: 63
End: 2023-04-05
Payer: COMMERCIAL

## 2023-04-05 LAB — RETINOPATHY: POSITIVE

## 2023-05-29 DIAGNOSIS — F51.01 PRIMARY INSOMNIA: ICD-10-CM

## 2023-05-29 RX ORDER — ZOLPIDEM TARTRATE 10 MG/1
TABLET ORAL
Qty: 30 TABLET | Refills: 1 | Status: SHIPPED | OUTPATIENT
Start: 2023-05-29 | End: 2023-07-27

## 2023-06-11 ENCOUNTER — HEALTH MAINTENANCE LETTER (OUTPATIENT)
Age: 63
End: 2023-06-11

## 2023-07-26 DIAGNOSIS — F51.01 PRIMARY INSOMNIA: ICD-10-CM

## 2023-07-27 DIAGNOSIS — K21.00 GASTROESOPHAGEAL REFLUX DISEASE WITH ESOPHAGITIS: ICD-10-CM

## 2023-07-27 RX ORDER — SUCRALFATE 1 G/1
TABLET ORAL
Qty: 360 TABLET | Refills: 0 | Status: SHIPPED | OUTPATIENT
Start: 2023-07-27 | End: 2024-03-20

## 2023-07-27 RX ORDER — ZOLPIDEM TARTRATE 10 MG/1
TABLET ORAL
Qty: 30 TABLET | Refills: 2 | Status: SHIPPED | OUTPATIENT
Start: 2023-07-27 | End: 2023-09-01

## 2023-08-23 ENCOUNTER — LAB (OUTPATIENT)
Dept: LAB | Facility: CLINIC | Age: 63
End: 2023-08-23
Payer: COMMERCIAL

## 2023-08-23 DIAGNOSIS — E11.9 TYPE 2 DIABETES MELLITUS WITHOUT COMPLICATION, WITHOUT LONG-TERM CURRENT USE OF INSULIN (H): ICD-10-CM

## 2023-08-23 DIAGNOSIS — I10 ESSENTIAL HYPERTENSION, BENIGN: ICD-10-CM

## 2023-08-23 DIAGNOSIS — E78.5 HYPERLIPIDEMIA LDL GOAL <130: Primary | ICD-10-CM

## 2023-08-23 LAB
ALBUMIN SERPL BCG-MCNC: 4.4 G/DL (ref 3.5–5.2)
ALP SERPL-CCNC: 77 U/L (ref 40–129)
ALT SERPL W P-5'-P-CCNC: 70 U/L (ref 0–70)
ANION GAP SERPL CALCULATED.3IONS-SCNC: 11 MMOL/L (ref 7–15)
AST SERPL W P-5'-P-CCNC: 52 U/L (ref 0–45)
BILIRUB DIRECT SERPL-MCNC: 0.21 MG/DL (ref 0–0.3)
BILIRUB SERPL-MCNC: 0.7 MG/DL
BUN SERPL-MCNC: 14.5 MG/DL (ref 8–23)
CALCIUM SERPL-MCNC: 9.2 MG/DL (ref 8.8–10.2)
CHLORIDE SERPL-SCNC: 102 MMOL/L (ref 98–107)
CHOLEST SERPL-MCNC: 194 MG/DL
CREAT SERPL-MCNC: 0.78 MG/DL (ref 0.67–1.17)
CREAT UR-MCNC: 88.4 MG/DL
DEPRECATED HCO3 PLAS-SCNC: 23 MMOL/L (ref 22–29)
ERYTHROCYTE [DISTWIDTH] IN BLOOD BY AUTOMATED COUNT: 12.7 % (ref 10–15)
GFR SERPL CREATININE-BSD FRML MDRD: >90 ML/MIN/1.73M2
GLUCOSE SERPL-MCNC: 104 MG/DL (ref 70–99)
HBA1C MFR BLD: 6 % (ref 0–5.6)
HCT VFR BLD AUTO: 47.7 % (ref 40–53)
HDLC SERPL-MCNC: 36 MG/DL
HGB BLD-MCNC: 16.6 G/DL (ref 13.3–17.7)
LDLC SERPL CALC-MCNC: 90 MG/DL
MCH RBC QN AUTO: 30.8 PG (ref 26.5–33)
MCHC RBC AUTO-ENTMCNC: 34.8 G/DL (ref 31.5–36.5)
MCV RBC AUTO: 89 FL (ref 78–100)
MICROALBUMIN UR-MCNC: <12 MG/L
MICROALBUMIN/CREAT UR: NORMAL MG/G{CREAT}
NONHDLC SERPL-MCNC: 158 MG/DL
PLATELET # BLD AUTO: 229 10E3/UL (ref 150–450)
POTASSIUM SERPL-SCNC: 3.9 MMOL/L (ref 3.4–5.3)
PROT SERPL-MCNC: 7.1 G/DL (ref 6.4–8.3)
RBC # BLD AUTO: 5.39 10E6/UL (ref 4.4–5.9)
SODIUM SERPL-SCNC: 136 MMOL/L (ref 136–145)
TRIGL SERPL-MCNC: 342 MG/DL
WBC # BLD AUTO: 8.7 10E3/UL (ref 4–11)

## 2023-08-23 PROCEDURE — 36415 COLL VENOUS BLD VENIPUNCTURE: CPT

## 2023-08-23 PROCEDURE — 82570 ASSAY OF URINE CREATININE: CPT

## 2023-08-23 PROCEDURE — 80053 COMPREHEN METABOLIC PANEL: CPT

## 2023-08-23 PROCEDURE — 83036 HEMOGLOBIN GLYCOSYLATED A1C: CPT

## 2023-08-23 PROCEDURE — 80061 LIPID PANEL: CPT

## 2023-08-23 PROCEDURE — 82043 UR ALBUMIN QUANTITATIVE: CPT

## 2023-08-23 PROCEDURE — 85027 COMPLETE CBC AUTOMATED: CPT

## 2023-08-23 PROCEDURE — 82248 BILIRUBIN DIRECT: CPT

## 2023-09-01 ENCOUNTER — OFFICE VISIT (OUTPATIENT)
Dept: INTERNAL MEDICINE | Facility: CLINIC | Age: 63
End: 2023-09-01
Payer: COMMERCIAL

## 2023-09-01 VITALS
RESPIRATION RATE: 12 BRPM | HEIGHT: 71 IN | WEIGHT: 264 LBS | TEMPERATURE: 97.9 F | SYSTOLIC BLOOD PRESSURE: 128 MMHG | HEART RATE: 88 BPM | BODY MASS INDEX: 36.96 KG/M2 | OXYGEN SATURATION: 98 % | DIASTOLIC BLOOD PRESSURE: 72 MMHG

## 2023-09-01 DIAGNOSIS — E66.01 SEVERE OBESITY (BMI 35.0-35.9 WITH COMORBIDITY) (H): ICD-10-CM

## 2023-09-01 DIAGNOSIS — I10 ESSENTIAL HYPERTENSION, BENIGN: ICD-10-CM

## 2023-09-01 DIAGNOSIS — E78.5 HYPERLIPIDEMIA LDL GOAL <130: ICD-10-CM

## 2023-09-01 DIAGNOSIS — M19.042 PRIMARY OSTEOARTHRITIS OF BOTH HANDS: ICD-10-CM

## 2023-09-01 DIAGNOSIS — K21.9 GASTROESOPHAGEAL REFLUX DISEASE WITHOUT ESOPHAGITIS: ICD-10-CM

## 2023-09-01 DIAGNOSIS — M19.041 PRIMARY OSTEOARTHRITIS OF BOTH HANDS: ICD-10-CM

## 2023-09-01 DIAGNOSIS — F51.01 PRIMARY INSOMNIA: ICD-10-CM

## 2023-09-01 DIAGNOSIS — Z12.11 SCREEN FOR COLON CANCER: ICD-10-CM

## 2023-09-01 DIAGNOSIS — E11.9 TYPE 2 DIABETES MELLITUS WITHOUT COMPLICATION, WITHOUT LONG-TERM CURRENT USE OF INSULIN (H): Primary | ICD-10-CM

## 2023-09-01 PROCEDURE — 99214 OFFICE O/P EST MOD 30 MIN: CPT | Performed by: INTERNAL MEDICINE

## 2023-09-01 RX ORDER — PRAVASTATIN SODIUM 40 MG
40 TABLET ORAL DAILY
Qty: 90 TABLET | Refills: 3 | Status: SHIPPED | OUTPATIENT
Start: 2023-09-01 | End: 2024-09-17

## 2023-09-01 RX ORDER — METFORMIN HCL 500 MG
500 TABLET, EXTENDED RELEASE 24 HR ORAL
Qty: 90 TABLET | Refills: 1 | Status: SHIPPED | OUTPATIENT
Start: 2023-09-01 | End: 2024-04-15

## 2023-09-01 RX ORDER — LISINOPRIL AND HYDROCHLOROTHIAZIDE 12.5; 2 MG/1; MG/1
2 TABLET ORAL EVERY MORNING
Qty: 180 TABLET | Refills: 3 | Status: SHIPPED | OUTPATIENT
Start: 2023-09-01 | End: 2024-09-17

## 2023-09-01 RX ORDER — ZOLPIDEM TARTRATE 10 MG/1
5-10 TABLET ORAL
Qty: 30 TABLET | Refills: 5 | Status: SHIPPED | OUTPATIENT
Start: 2023-09-01 | End: 2024-03-05

## 2023-09-01 RX ORDER — CELECOXIB 200 MG/1
200 CAPSULE ORAL 2 TIMES DAILY PRN
Qty: 180 CAPSULE | Refills: 3 | Status: SHIPPED | OUTPATIENT
Start: 2023-09-01 | End: 2024-08-19

## 2023-09-01 RX ORDER — METOPROLOL SUCCINATE 25 MG/1
25 TABLET, EXTENDED RELEASE ORAL DAILY
Qty: 90 TABLET | Refills: 3 | Status: SHIPPED | OUTPATIENT
Start: 2023-09-01

## 2023-09-01 RX ORDER — OMEPRAZOLE 40 MG/1
40 CAPSULE, DELAYED RELEASE ORAL DAILY
Qty: 90 CAPSULE | Refills: 3 | Status: SHIPPED | OUTPATIENT
Start: 2023-09-01

## 2023-09-01 NOTE — PATIENT INSTRUCTIONS
" Continue all medications at the same doses.  Contact your usual pharmacy if you need refills.      Covid vaccines are now recommended annually.  Get the most updated Covid vaccine when it becomes available, consider getting this at the same time as the annual influenza vaccine.      Return to see me in 6 months, sooner if needed.  Please get fasting labs done at the [facility] (formerly [facility]) or any other [facility] lab 1-2 days before this appointment (schedule a \"lab appointment\").  If you get the labs done at another clinic, make arrangements with them directly.  The orders will be in place.  Eat nothing for at least 8 hours prior to having these labs drawn.  Use [facility] or Call [phone] to schedule the appointment with me and lab appointment.      Investigate a Shingrix shingles vaccine with your pharmacist .  They can tell you the coverage and cost and then give it to you if the price is acceptable.    Medicare sometimes does not cover these Shingrix shingles vaccines, and with Medicare insurance it is usually cheaper to receive this shingles vaccine from a pharmacist in a pharmacy rather than in our clinic.    At this time, you only need the 2 Shingrix vaccines and then you are done.      Return to see me in approximately 6 months, sooner if needed.  Please get nonfasting labs done in the [facility] or at any other [facility] lab 1-2 days before this appointment.  If you get the labs done at another clinic, make arrangements with that clinic directly.  The orders will be in place.  Use [facility] or Call [phone] to schedule the appointment with me and lab appointment.             5 GOALS IN MANAGING DIABETES (i.e. to give the best chance to prevent diabetic complications and vascular disease):     1.  Aggressive diabetic management.  The target for A1C (3 months average blood sugar) is ideally below 6.5, preferably below 7.5    Your diabetes is under good control.      Lab Results " "  Component Value Date    A1C 6.0 08/23/2023    A1C 5.9 03/10/2023    A1C 5.6 08/24/2022    A1C 5.8 03/25/2022    A1C 8.9 12/08/2021    A1C 6.3 08/18/2020    A1C 6.0 08/30/2019    A1C 6.0 03/05/2019    A1C 5.9 10/30/2014    A1C 5.7 09/23/2013    A1C 6.1 09/26/2012    A1C 6.2 09/28/2009    A1C 5.9 04/08/2008    A1C 5.3 09/27/2007       2.  Aggressive blood pressure control, under 130/80 ideally.  Using medications if needed.    Your blood pressure is under good control    BP Readings from Last 4 Encounters:   09/01/23 128/72   03/24/23 124/82   09/09/22 138/88   04/01/22 124/72       3.  Aggressive LDL cholesterol (bad cholesterol) lowering as needed.  Your goal is an LDL under 100 for sure, preferably under 70.     *  All patients with diabetes are recommended to be on a \"statin\" cholesterol lowering medication regardless of the cholesterol levels to give the best chance at avoiding vascular disease.      New guidelines identify four high-risk groups who could benefit from statins:   *people with pre-existing heart disease, such as those who have had a heart attack;   *people ages 40 to 75 who have diabetes of any type  *patients ages 40 to 75 with at least a 7.5% risk of developing cardiovascular disease over the next decade, according to a formula described in the guidelines  *patients with the sort of super-high cholesterol that sometimes runs in families, as evidenced by an LDL of 190 milligrams per deciliter or higher    *  Your cholesterol levels are well controlled.    Recent Labs   Lab Test 08/23/23  0738 08/24/22  0819   CHOL 194 163   HDL 36* 44   LDL 90 85   TRIG 342* 172*       4.  No smoking    5.  Consider daily preventative Aspirin once per day, every day over age 50 unless there is a specific reason that you cannot take aspirin.       *You should take Aspirin 81 mg once per day, unless you have a reason NOT to take aspirin (i.e. side effect, excessive bruising or bleeding, taking another \"blood " "tinning\" medication, etc.)       DIABETES REMINDERS:   1. Check your blood glucose regularly either with standard glucometer or personal continuous glucose monitor.    2) Your blood sugar goals:  before eating and  two hours after eating.  If using personal continuous glucose monitor, the goal is Time in the Target range ( ) of 70-75% with very few (under 2%) Below target.    3) Always be prepared to treat low blood sugar symptoms should it happen. Keep a sugar-containing beverage or food nearby.  4) When to call your clinic:     Blood sugar over 400     If you have 2 to 3 low blood sugars (under 70) in a row    Low readings the same time of day several days in a row  5) When to call 911: If your low blood glucose symptoms do not get better with treatment, or if you/someone else is unable to give you treatment.  6) Work with a Certified Diabetes Educator to assist you with your diabetes management  7) Contact us if you have ANY questions about your medications or instructions, or have problems with getting prescriptions filled.     "

## 2023-09-01 NOTE — PROGRESS NOTES
Assessment & Plan     (E11.9) Type 2 diabetes mellitus without complication, without long-term current use of insulin (H)  (primary encounter diagnosis)  Comment: This condition is currently controlled on the current medical regimen.  Continue current therapy.   Discussed importance of aggressive management of diabetes, including aggressive low cabr diet (one of the most powerful ways to control type II DM), performing regular glucose monitoring, (either with standard glucometer, or preferably personal continuous glucose monitor), medication compliance, regular laboratory monitoring at least every 6 months (or possibly more often if diabetes not at goal), and attending regular follow up appointments as ordered.    Aggressive diabetes management of diabetes will greatly reduce the future risk of diabetes related complications such as CAD, CVA, PVD, and retinopathy/neuropathy/nephropathy.  Based on level of diabetes control: testing frequency ONE TIME PER DAY   Plan: REVIEW OF HEALTH MAINTENANCE PROTOCOL ORDERS,         metFORMIN (GLUCOPHAGE XR) 500 MG 24 hr tablet,         PRIMARY CARE FOLLOW-UP SCHEDULING            (E66.01,  Z68.35) Severe obesity (BMI 35.0-35.9 with comorbidity) (H)  Comment: obesity contributes to HTN and DM II.   Current BMI is: Body mass index is 36.82 kg/m ..  Reviewed weight patterns.   Obesity as a biological preventable and treatable disease, which is associated with significantly increased risk of many acute and chronic health conditions.   Obesity has now been recognized as a chronic disease by the American Medical Association.  Obesity has serious co-morbidities associated with obesity including increased risk for hypertension, stroke, coronary artery disease, dyslipidemia, Type II diabetes, depression, sleep apnea, cancers of the colon, breast and endometrium, obstructive sleep apnea, osteoarthritis and female infertility.  Recommended regular aerobic exercise, recommended improved  diet aiming at lowering amount of processed foods, lower sugars, moderation/reduction of simple carbs and fat in the diet, establishing more regular meal times, always eating breakfast, front loading some of the calories and adding more protein to the diet.      Plan:     (M19.041,  M19.042) Primary osteoarthritis of both hands  Comment: This condition is currently controlled on the current medical regimen.  Continue current therapy.   Plan: REVIEW OF HEALTH MAINTENANCE PROTOCOL ORDERS,         celecoxib (CELEBREX) 200 MG capsule, PRIMARY         CARE FOLLOW-UP SCHEDULING            (I10) Essential hypertension, benign  Comment: This condition is currently controlled on the current medical regimen.  Continue current therapy.   Plan: REVIEW OF HEALTH MAINTENANCE PROTOCOL ORDERS,         lisinopril-hydrochlorothiazide (ZESTORETIC)         20-12.5 MG tablet, metoprolol succinate ER         (TOPROL XL) 25 MG 24 hr tablet, PRIMARY CARE         FOLLOW-UP SCHEDULING            (K21.9) Gastroesophageal reflux disease without esophagitis  Comment: This condition is currently controlled on the current medical regimen.  Continue current therapy.   Does not feel he needs carafate any longer.   But still feels he needs omeprazole.   Plan: REVIEW OF HEALTH MAINTENANCE PROTOCOL ORDERS,         omeprazole (PRILOSEC) 40 MG DR capsule, PRIMARY        CARE FOLLOW-UP SCHEDULING            (E78.5) Hyperlipidemia LDL goal <130  Comment: This condition is currently controlled on the current medical regimen.  Continue current therapy.   Discussed guidelines recommending a statin cholesterol lowering medication for any patient with either diabetes and/or vascular disease, aiming for a LDL goal under 100 for sure, ideally under 70, using whatever dose of statin tolerated.    Plan: REVIEW OF HEALTH MAINTENANCE PROTOCOL ORDERS,         pravastatin (PRAVACHOL) 40 MG tablet, PRIMARY         CARE FOLLOW-UP SCHEDULING            (F51.01) Primary  "insomnia  Comment: This condition is currently controlled on the current medical regimen.  Continue current therapy.   He has not experienced any significant side effects of this medication.   Plan: zolpidem (AMBIEN) 10 MG tablet, PRIMARY CARE         FOLLOW-UP SCHEDULING            (Z12.11) Screen for colon cancer  Comment: I discussed current recommendations for routine colon cancer screening starting at age 45 (age 35 for family history of colon cancer).  Recommending colonoscopy as gold standard test, but the patient is declining to do colonoscopy at this time.   Discussed ColoGuard stool test for routine colon cancer screening, and will order it if covered by their insurance.   If result Negative, repeat ColoGuard testing every 3 years (or eventually consider colonoscopy)  If result Positive, does not necessarily mean colon cancer, but means they need colonoscopy.    Plan: REVIEW OF HEALTH MAINTENANCE PROTOCOL ORDERS,         COLOGUARD([employer]), PRIMARY CARE         FOLLOW-UP SCHEDULING                        BMI:   Estimated body mass index is 36.82 kg/m  as calculated from the following:    Height as of this encounter: 1.803 m (5' 11\").    Weight as of this encounter: 119.7 kg (264 lb).           Shayne Suggs MD  [facility]    Duaen Berger is a 63 year old, presenting for the following health issues:  Diabetes      History of Present Illness       Diabetes:   He presents for follow up of diabetes.    He is not checking blood glucose.         He has no concerns regarding his diabetes at this time.   He is not experiencing numbness or burning in feet, excessive thirst, blurry vision, weight changes or redness, sores or blisters on feet.           He eats 2-3 servings of fruits and vegetables daily.He consumes 0 sweetened beverage(s) daily.He exercises with enough effort to increase his heart rate 60 or more minutes per day.  He exercises with enough " effort to increase his heart rate 5 days per week.   He is taking medications regularly.       Hypertension:  History of hypertension, on medication.  No reported side effects from medications.    Reviewed last 6 BP readings in chart:  BP Readings from Last 6 Encounters:   09/01/23 128/72   03/24/23 124/82   09/09/22 138/88   04/01/22 124/72   12/08/21 126/78   04/12/21 (!) 160/110     No active cardiac complaints or symptoms with exercise.     The patient has had a history of ongoing obesity.  Reviewed the weigth curves.   Their current BMI is:  Body mass index is 36.82 kg/m .  Reviewed previous attempts at weight loss which have not been successful in producing prolonged weigth loss.   Discussed current eating and exercise habits.     Reviewed weights in chart:  Wt Readings from Last 10 Encounters:   09/01/23 119.7 kg (264 lb)   03/24/23 112.4 kg (247 lb 11.2 oz)   09/09/22 109.9 kg (242 lb 4.8 oz)   04/01/22 112 kg (247 lb)   12/08/21 127.9 kg (282 lb)   04/12/21 134.4 kg (296 lb 6.4 oz)   08/20/20 127.2 kg (280 lb 8 oz)   10/28/19 127 kg (280 lb)   10/28/19 127.9 kg (281 lb 14.4 oz)   10/26/19 127 kg (280 lb)          osteoarthritis of both hands, taking celebre xwith good control.     The patient has regular insomnia for which they take Ambien.  There are no reported side effects from this medication.   The patient feels that the medication helps them sleep and is beneficial enough to desire to keep taking it.    They report no excessive drowsiness, no sleep walking, no nightmares/bad dreams, no impaired memory or cognitive function, and no adverse changes in mood.     **I reviewed the information recorded in the patient's EPIC chart (including but not limited to medical history, surgical history, family history, problem list, medication list, and allergy list) and updated the information as indicated based on the patients reported information.           Review of Systems   Constitutional, HEENT,  "cardiovascular, pulmonary, gi and gu systems are negative, except as otherwise noted.      Objective    /72   Pulse 88   Temp 97.9  F (36.6  C) (Temporal)   Resp 12   Ht 1.803 m (5' 11\")   Wt 119.7 kg (264 lb)   SpO2 98%   BMI 36.82 kg/m    Body mass index is 36.82 kg/m .  Physical Exam   GENERAL alert and no distress  EYES:  Normal sclera,conjunctiva, EOMI  HENT: facies symmetric  MS: extremities- no gross deformities of the visible extremities noted,   PSYCH: Alert and oriented times 3; speech- coherent  SKIN:  No obvious significant skin lesions on visible portions of face   NEURO:  Normal facial movements.  Appears to move normally                       "

## 2024-01-07 ENCOUNTER — HEALTH MAINTENANCE LETTER (OUTPATIENT)
Age: 64
End: 2024-01-07

## 2024-03-05 DIAGNOSIS — F51.01 PRIMARY INSOMNIA: ICD-10-CM

## 2024-03-05 RX ORDER — ZOLPIDEM TARTRATE 10 MG/1
TABLET ORAL
Qty: 30 TABLET | Refills: 5 | Status: SHIPPED | OUTPATIENT
Start: 2024-03-05 | End: 2024-08-21

## 2024-03-13 ENCOUNTER — LAB (OUTPATIENT)
Dept: LAB | Facility: CLINIC | Age: 64
End: 2024-03-13
Payer: COMMERCIAL

## 2024-03-13 DIAGNOSIS — E11.9 TYPE 2 DIABETES MELLITUS WITHOUT COMPLICATION, WITHOUT LONG-TERM CURRENT USE OF INSULIN (H): Primary | ICD-10-CM

## 2024-03-13 LAB — HBA1C MFR BLD: 6.5 % (ref 0–5.6)

## 2024-03-13 PROCEDURE — 36415 COLL VENOUS BLD VENIPUNCTURE: CPT

## 2024-03-13 PROCEDURE — 83036 HEMOGLOBIN GLYCOSYLATED A1C: CPT

## 2024-03-17 SDOH — HEALTH STABILITY: PHYSICAL HEALTH: ON AVERAGE, HOW MANY MINUTES DO YOU ENGAGE IN EXERCISE AT THIS LEVEL?: PATIENT DECLINED

## 2024-03-17 SDOH — HEALTH STABILITY: PHYSICAL HEALTH: ON AVERAGE, HOW MANY DAYS PER WEEK DO YOU ENGAGE IN MODERATE TO STRENUOUS EXERCISE (LIKE A BRISK WALK)?: 5 DAYS

## 2024-03-17 ASSESSMENT — SOCIAL DETERMINANTS OF HEALTH (SDOH): HOW OFTEN DO YOU GET TOGETHER WITH FRIENDS OR RELATIVES?: PATIENT DECLINED

## 2024-03-20 ENCOUNTER — OFFICE VISIT (OUTPATIENT)
Dept: INTERNAL MEDICINE | Facility: CLINIC | Age: 64
End: 2024-03-20
Attending: INTERNAL MEDICINE
Payer: COMMERCIAL

## 2024-03-20 VITALS
SYSTOLIC BLOOD PRESSURE: 132 MMHG | TEMPERATURE: 98 F | OXYGEN SATURATION: 98 % | WEIGHT: 269.3 LBS | BODY MASS INDEX: 37.7 KG/M2 | HEART RATE: 82 BPM | HEIGHT: 71 IN | DIASTOLIC BLOOD PRESSURE: 84 MMHG

## 2024-03-20 DIAGNOSIS — Z12.5 SCREENING FOR PROSTATE CANCER: ICD-10-CM

## 2024-03-20 DIAGNOSIS — F51.01 PRIMARY INSOMNIA: ICD-10-CM

## 2024-03-20 DIAGNOSIS — E11.9 TYPE 2 DIABETES MELLITUS WITHOUT COMPLICATION, WITHOUT LONG-TERM CURRENT USE OF INSULIN (H): Primary | ICD-10-CM

## 2024-03-20 DIAGNOSIS — Z23 HIGH PRIORITY FOR COVID-19 VACCINATION: ICD-10-CM

## 2024-03-20 DIAGNOSIS — Z12.11 SCREEN FOR COLON CANCER: ICD-10-CM

## 2024-03-20 DIAGNOSIS — K21.9 GASTROESOPHAGEAL REFLUX DISEASE WITHOUT ESOPHAGITIS: ICD-10-CM

## 2024-03-20 DIAGNOSIS — M19.041 PRIMARY OSTEOARTHRITIS OF BOTH HANDS: ICD-10-CM

## 2024-03-20 DIAGNOSIS — E66.01 SEVERE OBESITY (BMI 35.0-35.9 WITH COMORBIDITY) (H): ICD-10-CM

## 2024-03-20 DIAGNOSIS — E78.5 HYPERLIPIDEMIA LDL GOAL <130: ICD-10-CM

## 2024-03-20 DIAGNOSIS — M19.042 PRIMARY OSTEOARTHRITIS OF BOTH HANDS: ICD-10-CM

## 2024-03-20 DIAGNOSIS — I10 ESSENTIAL HYPERTENSION, BENIGN: ICD-10-CM

## 2024-03-20 PROCEDURE — 99214 OFFICE O/P EST MOD 30 MIN: CPT | Mod: 25 | Performed by: INTERNAL MEDICINE

## 2024-03-20 PROCEDURE — 90480 ADMN SARSCOV2 VAC 1/ONLY CMP: CPT | Performed by: INTERNAL MEDICINE

## 2024-03-20 PROCEDURE — 91320 SARSCV2 VAC 30MCG TRS-SUC IM: CPT | Performed by: INTERNAL MEDICINE

## 2024-03-20 PROCEDURE — 99396 PREV VISIT EST AGE 40-64: CPT | Mod: 25 | Performed by: INTERNAL MEDICINE

## 2024-03-20 ASSESSMENT — PAIN SCALES - GENERAL: PAINLEVEL: MILD PAIN (2)

## 2024-03-20 NOTE — PATIENT INSTRUCTIONS
" Continue all medications at the same doses.  Contact your usual pharmacy if you need refills.      Return to see me in 6 months, sooner if needed.  Please get fasting labs done at the [facility] or any other [facility] lab 1-2 days before this appointment (schedule a \"lab appointment\").  If you get the labs done at another clinic, make arrangements with them directly.  The orders will be in place.  Eat nothing for at least 8 hours prior to having these labs drawn.  Use [facility] or Call [phone] to schedule the appointment with me and lab appointment.           ColoGuard Colon cancer screening:     *  The ColoGuard stool test is good noninvasive way to screen for colon cancer, in average risk individuals.      *  You should NOT use ColoGuard for colon cancer screening if you have a family history of colon cancer or if you have a history of pre-cancerous polyps, you should have a colonoscopy if you have either of these situations.      *  The ColoGuard collection kit will be mailed to your home address by the [employer].  Follow the instructions for collecting and returning the tablet.      *  I will inform you about the results.     *  If the ColoGuard test is Negative, then no colon cancer screening needed for 3 years (would consider either repeating the ColoGuard test or else doing a colonoscopy).     *  If the ColoGuard test is Positive, this does NOT mean that you have colon cacner, it simply means that you will need to have a colonoscopy.  Most of the time, pre-cancerous polyps can turn this test positive.              5 GOALS IN MANAGING DIABETES (i.e. to give the best chance to prevent diabetic complications and vascular disease):     1.  Aggressive diabetic management.  The target for A1C (3 months average blood sugar) is ideally below 6.5, preferably below 7.5    Your diabetes is under good control.      Lab Results   Component Value Date    A1C 6.5 03/13/2024    A1C 6.0 " "08/23/2023    A1C 5.9 03/10/2023    A1C 5.6 08/24/2022    A1C 5.8 03/25/2022    A1C 8.9 12/08/2021    A1C 6.3 08/18/2020    A1C 6.0 08/30/2019    A1C 6.0 03/05/2019    A1C 5.9 10/30/2014    A1C 5.7 09/23/2013    A1C 6.1 09/26/2012    A1C 6.2 09/28/2009    A1C 5.9 04/08/2008    A1C 5.3 09/27/2007       2.  Aggressive blood pressure control, under 130/80 ideally.  Using medications if needed.    Your blood pressure is under good control    BP Readings from Last 4 Encounters:   03/20/24 132/84   09/01/23 128/72   03/24/23 124/82   09/09/22 138/88       3.  Aggressive LDL cholesterol (bad cholesterol) lowering as needed.  Your goal is an LDL under 100 for sure, preferably under 70.     *  All patients with diabetes are recommended to be on a \"statin\" cholesterol lowering medication regardless of the cholesterol levels to give the best chance at avoiding vascular disease.      New guidelines identify four high-risk groups who could benefit from statins:   *people with pre-existing heart disease, such as those who have had a heart attack;   *people ages 40 to 75 who have diabetes of any type  *patients ages 40 to 75 with at least a 7.5% risk of developing cardiovascular disease over the next decade, according to a formula described in the guidelines  *patients with the sort of super-high cholesterol that sometimes runs in families, as evidenced by an LDL of 190 milligrams per deciliter or higher    *  Your cholesterol levels are well controlled.    Recent Labs   Lab Test 08/23/23  0738 08/24/22  0819   CHOL 194 163   HDL 36* 44   LDL 90 85   TRIG 342* 172*       4.  No smoking    5.  Consider daily preventative Aspirin once per day, every day over age 50 unless there is a specific reason that you cannot take aspirin.       *You should take Aspirin 81 mg once per day, unless you have a reason NOT to take aspirin (i.e. side effect, excessive bruising or bleeding, taking another \"blood tinning\" medication, etc.) "       DIABETES REMINDERS:   1. Check your blood glucose regularly either with standard glucometer or personal continuous glucose monitor.    2) Your blood sugar goals:  before eating and  two hours after eating.  If using personal continuous glucose monitor, the goal is Time in the Target range ( ) of 70-75% with very few (under 2%) Below target.    3) Always be prepared to treat low blood sugar symptoms should it happen. Keep a sugar-containing beverage or food nearby.  4) When to call your clinic:     Blood sugar over 400     If you have 2 to 3 low blood sugars (under 70) in a row    Low readings the same time of day several days in a row  5) When to call 911: If your low blood glucose symptoms do not get better with treatment, or if you/someone else is unable to give you treatment.  6) Work with a Certified Diabetes Educator to assist you with your diabetes management  7) Contact us if you have ANY questions about your medications or instructions, or have problems with getting prescriptions filled.         Preventive Health Recommendations  Male Ages 45 - 64    Yearly exam:             See your health care provider every year in order to  o   Review health changes.   o   Discuss preventive care.    o   Review your medicines if your doctor has prescribed any.   Continue to review and reassess the management of any ongoing chronic medical conditions  Have a cholesterol test every 5 years, or more frequently if you are at risk for high cholesterol/heart disease.   Have a diabetes test (fasting glucose) at least every three years. If you are at risk for diabetes, you should have this test more often.   Regular colon cancer screening starting age 45 with either a colonoscopy, or stool test (either Cologuard every 3 years or yearly FIT stool test from us).  The intervals for colon cancer screening will be determined by family history and prior colon cancer screening results.   Routine prostate cancer  "screening with a PSA blood test every 1-2 years.   While the PSA blood test is not a direct cancer screening blood test, it detects inflammation within in the prostate, which could indicate possible cancer.  If the test is abnormal, you do not necessarily have prostate cancer, but would need further evaluation.    You should be tested each year for STDs (sexually transmitted diseases), if you re at risk.     Shots:   Get a flu shot each year.   Covid vaccines are now recommended annually.  Get the most updated Covid vaccine when it becomes available, consider getting this at the same time as the annual influenza vaccine.   Get a tetanus shot every 10 years.  Everyone over age 50 should strongly consider the Shingrix shingles vaccine to give you the best chance of avoiding future shingles infection (as many as 1 and 3 adults over age 50 may develop this condition in their lifetime).    Investigate the cost and coverage for Shingrix shingles vaccines with a pharmacist at a pharmacy.  They can tell you the coverage and cost and then give it to you if the price is acceptable.    In case your insurance does not cover a Shingrix shingles vaccine, it is cheaper to receive this shingles vaccine from a pharmacist in a pharmacy rather than in our clinic.    At this time, you only need the 2 Shingrix vaccines and then you are done.       Nutrition:  Eat at least 5 servings of fruits and vegetables daily.   Eat whole-grain bread, whole-wheat pasta and brown rice instead of white grains and rice.   Talk to your provider about Calcium and Vitamin D.        --Good Grains:  Oats, brown rice, Quinoa (these do not raise the blood sugar as much)     --Bad grains:  Anything made from wheat or white rice     (because these raise the blood sugars significantly, and the possible gluten issue from wheat for some people).      --Proteins:  Aim for \"lean proteins\" including chicken, fish, seafood, pork, turkey, and eggs (in moderation); Eat " red meat only occasionally    Lifestyle  Exercise for at least 150 minutes a week (30 minutes a day, 5 days a week). This will help you control your weight and prevent disease.   Limit alcohol to one drink per day.   No smoking.   Wear sunscreen to prevent skin cancer.   See your dentist every six months for an exam and cleaning.   See your eye doctor every 1 to 2 years.

## 2024-03-20 NOTE — PROGRESS NOTES
Preventive Care Visit  [facility]  Shayne Suggs MD, Internal Medicine  Mar 20, 2024      Assessment & Plan     (E11.9) Type 2 diabetes mellitus without complication, without long-term current use of insulin (H)  (primary encounter diagnosis)  Comment: This condition is currently controlled on the current medical regimen.  Continue current therapy.   Discussed importance of aggressive management of diabetes, including aggressive low cabr diet (one of the most powerful ways to control type II DM), performing regular glucose monitoring, (either with standard glucometer, or preferably personal continuous glucose monitor), medication compliance, regular laboratory monitoring at least every 6 months (or possibly more often if diabetes not at goal), and attending regular follow up appointments as ordered.    Aggressive diabetes management of diabetes will greatly reduce the future risk of diabetes related complications such as CAD, CVA, PVD, and retinopathy/neuropathy/nephropathy.  Based on level of diabetes control: testing frequency ONE TIME PER DAY   Plan: REVIEW OF HEALTH MAINTENANCE PROTOCOL ORDERS            (E66.01,  Z68.35) Severe obesity (BMI 35.0-35.9 with comorbidity) (H)  Comment: Obesity is contributing to HTN, DM II.  Current BMI is: Body mass index is 37.56 kg/m ..  Reviewed weight patterns.   Obesity as a biological preventable and treatable disease, which is associated with significantly increased risk of many acute and chronic health conditions.   Obesity has now been recognized as a chronic disease by the American Medical Association.  Obesity has serious co-morbidities associated with obesity including increased risk for hypertension, stroke, coronary artery disease, dyslipidemia, Type II diabetes, depression, sleep apnea, cancers of the colon, breast and endometrium, obstructive sleep apnea, osteoarthritis and female infertility.  Recommended regular aerobic exercise,  recommended improved diet aiming at lowering amount of processed foods, lower sugars, moderation/reduction of simple carbs and fat in the diet, establishing more regular meal times, always eating breakfast, front loading some of the calories and adding more protein to the diet.     Referral to Weight Loss Clinic for discussion of more aggressive measures for weight loss can be considered (including medical options (e.g. GLP-1, or appetite suppressants, etc.) or bariatric surgical procedures.   Plan: REVIEW OF HEALTH MAINTENANCE PROTOCOL ORDERS            (M19.041,  M19.042) Primary osteoarthritis of both hands  Comment:   Plan: REVIEW OF HEALTH MAINTENANCE PROTOCOL ORDERS            (I10) Essential hypertension, benign  Comment: This condition is currently controlled on the current medical regimen.  Continue current therapy.   Plan: REVIEW OF HEALTH MAINTENANCE PROTOCOL ORDERS            (K21.9) Gastroesophageal reflux disease without esophagitis  Comment: This condition is currently controlled on the current medical regimen.  Continue current therapy.   Plan: REVIEW OF HEALTH MAINTENANCE PROTOCOL ORDERS            (E78.5) Hyperlipidemia LDL goal <130  Comment: This condition is currently controlled on the current medical regimen.  Continue current therapy.   Plan: REVIEW OF HEALTH MAINTENANCE PROTOCOL ORDERS            (F51.01) Primary insomnia  Comment: This condition is currently controlled on the current medical regimen.  Continue current therapy.   Plan: REVIEW OF HEALTH MAINTENANCE PROTOCOL ORDERS            (Z12.11) Screen for colon cancer  Comment: I discussed current recommendations for routine colon cancer screening starting at age 45 (age 35 for family history of colon cancer).  Recommending colonoscopy as gold standard test, but the patient is declining to do colonoscopy at this time.   Discussed ColoGuard stool test for routine colon cancer screening, and will order it if covered by their insurance.  "  If result Negative, repeat ColoGuard testing every 3 years (or eventually consider colonoscopy)  If result Positive, does not necessarily mean colon cancer, but means they need colonoscopy.    Plan: COLOGUARD(GLO), REVIEW OF HEALTH         MAINTENANCE PROTOCOL ORDERS            (Z23) High priority for COVID-19 vaccination  Comment:   Plan: COVID-19 12+ (2023-24) (PFIZER), REVIEW OF         HEALTH MAINTENANCE PROTOCOL ORDERS            (Z12.5) Screening for prostate cancer  Comment: Discussed prostate cancer screening and PSA blood test.  Discussed that an elevated PSA blood test can be caused by things other than prostate cancer, including infection/inflammation, BPH, and recent sexual activity; and that elevated PSA blood test may require further investigation with a urologist   Plan: REVIEW OF HEALTH MAINTENANCE PROTOCOL ORDERS               Patient has been advised of split billing requirements and indicates understanding: Yes          BMI  Estimated body mass index is 37.56 kg/m  as calculated from the following:    Height as of this encounter: 1.803 m (5' 11\").    Weight as of this encounter: 122.2 kg (269 lb 4.8 oz).       Counseling  Appropriate preventive services were discussed with this patient, including applicable screening as appropriate for fall prevention, nutrition, physical activity, Tobacco-use cessation, weight loss and cognition.  Checklist reviewing preventive services available has been given to the patient.          Duane Berger is a 63 year old, presenting for the following:  Physical        3/20/2024     1:38 PM   Additional Questions   Roomed by Patricia GUY CMA        Health Care Directive  Patient does not have a Health Care Directive or Living Will:     HPI      1.)  Diabetes:  In regards specifically to the patient's diabetes, they reports no unusual symptoms.  Medication compliance: good  Diabetic diet compliance: good    Patient reports no significant episodes of " hypo- or hyperglycemia    Diabetic complications: none     Most  recent labs:    Lab Results   Component Value Date    A1C 6.5 03/13/2024    A1C 6.0 08/23/2023    A1C 5.9 03/10/2023    A1C 5.6 08/24/2022    A1C 5.8 03/25/2022    A1C 8.9 12/08/2021    A1C 6.3 08/18/2020    A1C 6.0 08/30/2019    A1C 6.0 03/05/2019    A1C 5.9 10/30/2014    A1C 5.7 09/23/2013    A1C 6.1 09/26/2012    A1C 6.2 09/28/2009    A1C 5.9 04/08/2008    A1C 5.3 09/27/2007        2.)  The patient has regular insomnia for which they take Ambien.  There are no reported side effects from this medication.   The patient feels that the medication helps them sleep and is beneficial enough to desire to keep taking it.    They report no excessive drowsiness, no sleep walking, no nightmares/bad dreams, no impaired memory or cognitive function, and no adverse changes in mood.    3.)  Hypertension:  History of hypertension, on medication.  No reported side effects from medications.    Reviewed last 6 BP readings in chart:  BP Readings from Last 6 Encounters:   03/20/24 132/84   09/01/23 128/72   03/24/23 124/82   09/09/22 138/88   04/01/22 124/72   12/08/21 126/78     No active cardiac complaints or symptoms with exercise.     4.)  GERD stable.  Taking meds as ordered, no reported side effects from medicines.  Eating properly to avoid sx.   No melena, no hematochezia, no diarrhea.  No unintended weigth loss.  No dysphagia.  Reports no significant regular difficulties swallowing foods or medications.       5.) The patient has had a history of ongoing obesity.  Reviewed the weigth curves.   Their current BMI is:  Body mass index is 37.56 kg/m .  Reviewed previous attempts at weight loss which have not been successful in producing prolonged weigth loss.   Discussed current eating and exercise habits.     Reviewed weights in chart:  Wt Readings from Last 10 Encounters:   03/20/24 122.2 kg (269 lb 4.8 oz)   09/01/23 119.7 kg (264 lb)   03/24/23 112.4 kg (247 lb  11.2 oz)   09/09/22 109.9 kg (242 lb 4.8 oz)   04/01/22 112 kg (247 lb)   12/08/21 127.9 kg (282 lb)   04/12/21 134.4 kg (296 lb 6.4 oz)   08/20/20 127.2 kg (280 lb 8 oz)   10/28/19 127 kg (280 lb)   10/28/19 127.9 kg (281 lb 14.4 oz)              3/17/2024   General Health   How would you rate your overall physical health? Good   Feel stress (tense, anxious, or unable to sleep) Not at all         3/17/2024   Nutrition   Three or more servings of calcium each day? (!) NO   Diet: Diabetic   How many servings of fruit and vegetables per day? (!) 2-3   How many sweetened beverages each day? 0-1         3/17/2024   Exercise   Days per week of moderate/strenous exercise 5 days   Average minutes spent exercising at this level Patient declined         3/17/2024   Social Factors   Frequency of gathering with friends or relatives Patient declined   Worry food won't last until get money to buy more No   Food not last or not have enough money for food? No   Do you have housing?  Yes   Are you worried about losing your housing? No   Lack of transportation? No   Unable to get utilities (heat,electricity)? No         3/17/2024   Fall Risk   Fallen 2 or more times in the past year? No   Trouble with walking or balance? No          3/17/2024   Dental   Dentist two times every year? (!) NO         3/17/2024   TB Screening   Were you born outside of the [place]? No         Today's PHQ-2 Score:       3/19/2024     2:43 PM   PHQ-2 ( 1999 Pf[employer])   Q1: Little interest or pleasure in doing things 0   Q2: Feeling down, depressed or hopeless 0   PHQ-2 Score 0   Q1: Little interest or pleasure in doing things Not at all   Q2: Feeling down, depressed or hopeless Not at all   PHQ-2 Score 0           3/17/2024   Substance Use   Alcohol more than 3/day or more than 7/wk No   Do you use any other substances recreationally? (!) ALCOHOL     Social History     Tobacco Use    Smoking status: Never    Smokeless tobacco: Never   Substance Use Topics     "Alcohol use: Yes     Comment: 10-12 beers/week    Drug use: No           3/17/2024   STI Screening   New sexual partner(s) since last STI/HIV test? No   Last PSA:   PSA   Date Value Ref Range Status   08/18/2020 0.49 0 - 4 ug/L Final     Comment:     Assay Method:  Chemiluminescence using Siemens Vista analyzer     Prostate Specific Antigen Screen   Date Value Ref Range Status   03/10/2023 0.51 0.00 - 4.50 ng/mL Final   12/08/2021 0.60 0.00 - 4.00 ug/L Final     ASCVD Risk   The 10-year ASCVD risk score (Ric ARAUZ, et al., 2019) is: 25.1%    Values used to calculate the score:      Age: 63 years      Sex: Male      Is Non- : No      Diabetic: Yes      Tobacco smoker: No      Systolic Blood Pressure: 132 mmHg      Is BP treated: Yes      HDL Cholesterol: 36 mg/dL      Total Cholesterol: 194 mg/dL           Reviewed and updated as needed this visit by Provider     Meds    Surg Hx  Fam Hx              **I reviewed the information recorded in the patient's EPIC chart (including but not limited to medical history, surgical history, family history, problem list, medication list, and allergy list) and updated the information as indicated based on the patients reported information.         Review of Systems  Constitutional, HEENT, cardiovascular, pulmonary, gi and gu systems are negative, except as otherwise noted.     Objective    Exam  /84   Pulse 82   Temp 98  F (36.7  C) (Tympanic)   Ht 1.803 m (5' 11\")   Wt 122.2 kg (269 lb 4.8 oz)   SpO2 98%   BMI 37.56 kg/m     Estimated body mass index is 37.56 kg/m  as calculated from the following:    Height as of this encounter: 1.803 m (5' 11\").    Weight as of this encounter: 122.2 kg (269 lb 4.8 oz).    Physical Exam  GENERAL alert and no distress  EYES:  Normal sclera,conjunctiva, EOMI  HENT: oral and posterior pharynx without lesions or erythema, facies symmetric  NECK: Neck supple. No LAD, without thyroidmegaly.  RESP: Clear " to ausculation bilaterally without wheezes or crackles. Normal BS in all fields.  CV: RRR normal S1S2 without murmurs, rubs or gallops.  LYMPH: no cervical lymph adenopathy appreciated  MS: extremities- no gross deformities of the visible extremities noted,   EXT:  no lower extremity edema  PSYCH: Alert and oriented times 3; speech- coherent  SKIN:  No obvious significant skin lesions on visible portions of face         Signed Electronically by: Shayne Suggs MD

## 2024-04-14 DIAGNOSIS — E11.9 TYPE 2 DIABETES MELLITUS WITHOUT COMPLICATION, WITHOUT LONG-TERM CURRENT USE OF INSULIN (H): ICD-10-CM

## 2024-04-15 RX ORDER — METFORMIN HCL 500 MG
500 TABLET, EXTENDED RELEASE 24 HR ORAL
Qty: 90 TABLET | Refills: 0 | Status: SHIPPED | OUTPATIENT
Start: 2024-04-15 | End: 2024-07-15

## 2024-07-15 DIAGNOSIS — E11.9 TYPE 2 DIABETES MELLITUS WITHOUT COMPLICATION, WITHOUT LONG-TERM CURRENT USE OF INSULIN (H): ICD-10-CM

## 2024-07-15 RX ORDER — METFORMIN HCL 500 MG
500 TABLET, EXTENDED RELEASE 24 HR ORAL
Qty: 90 TABLET | Refills: 0 | Status: SHIPPED | OUTPATIENT
Start: 2024-07-15

## 2024-08-19 DIAGNOSIS — M19.042 PRIMARY OSTEOARTHRITIS OF BOTH HANDS: ICD-10-CM

## 2024-08-19 DIAGNOSIS — M19.041 PRIMARY OSTEOARTHRITIS OF BOTH HANDS: ICD-10-CM

## 2024-08-19 RX ORDER — CELECOXIB 200 MG/1
200 CAPSULE ORAL 2 TIMES DAILY PRN
Qty: 180 CAPSULE | Refills: 3 | Status: SHIPPED | OUTPATIENT
Start: 2024-08-19

## 2024-08-21 DIAGNOSIS — F51.01 PRIMARY INSOMNIA: ICD-10-CM

## 2024-08-21 RX ORDER — ZOLPIDEM TARTRATE 10 MG/1
TABLET ORAL
Qty: 30 TABLET | Refills: 5 | Status: SHIPPED | OUTPATIENT
Start: 2024-08-21

## 2024-09-08 ENCOUNTER — OFFICE VISIT (OUTPATIENT)
Dept: URGENT CARE | Facility: URGENT CARE | Age: 64
End: 2024-09-08
Payer: COMMERCIAL

## 2024-09-08 VITALS
HEART RATE: 107 BPM | SYSTOLIC BLOOD PRESSURE: 135 MMHG | OXYGEN SATURATION: 98 % | RESPIRATION RATE: 18 BRPM | DIASTOLIC BLOOD PRESSURE: 92 MMHG | WEIGHT: 279 LBS | BODY MASS INDEX: 38.91 KG/M2 | TEMPERATURE: 98.8 F

## 2024-09-08 DIAGNOSIS — R30.0 DYSURIA: Primary | ICD-10-CM

## 2024-09-08 LAB
ALBUMIN UR-MCNC: ABNORMAL MG/DL
APPEARANCE UR: CLEAR
BACTERIA #/AREA URNS HPF: ABNORMAL /HPF
BILIRUB UR QL STRIP: ABNORMAL
COLOR UR AUTO: YELLOW
GLUCOSE UR STRIP-MCNC: NEGATIVE MG/DL
HGB UR QL STRIP: ABNORMAL
KETONES UR STRIP-MCNC: NEGATIVE MG/DL
LEUKOCYTE ESTERASE UR QL STRIP: NEGATIVE
NITRATE UR QL: NEGATIVE
PH UR STRIP: 6 [PH] (ref 5–7)
RBC #/AREA URNS AUTO: ABNORMAL /HPF
SP GR UR STRIP: 1.01 (ref 1–1.03)
SQUAMOUS #/AREA URNS AUTO: ABNORMAL /LPF
UROBILINOGEN UR STRIP-ACNC: 1 E.U./DL
WBC #/AREA URNS AUTO: ABNORMAL /HPF

## 2024-09-08 PROCEDURE — 99213 OFFICE O/P EST LOW 20 MIN: CPT | Performed by: NURSE PRACTITIONER

## 2024-09-08 PROCEDURE — 81001 URINALYSIS AUTO W/SCOPE: CPT

## 2024-09-08 RX ORDER — NITROFURANTOIN 25; 75 MG/1; MG/1
100 CAPSULE ORAL 2 TIMES DAILY
Qty: 10 CAPSULE | Refills: 0 | Status: SHIPPED | OUTPATIENT
Start: 2024-09-08 | End: 2024-09-13

## 2024-09-08 NOTE — PROGRESS NOTES
Assessment & Plan     Dysuria    - UA Macroscopic with reflex to Microscopic and Culture - Clinic Collect  - UA Microscopic with Reflex to Culture  - nitroFURantoin macrocrystal-monohydrate (MACROBID) 100 MG capsule  Dispense: 10 capsule; Refill: 0     Patient Instructions     Results for orders placed or performed in visit on 09/08/24   UA Macroscopic with reflex to Microscopic and Culture - Clinic Collect     Status: Abnormal    Specimen: Urine, Clean Catch   Result Value Ref Range    Color Urine Yellow Colorless, Straw, Light Yellow, Yellow    Appearance Urine Clear Clear    Glucose Urine Negative Negative mg/dL    Bilirubin Urine Moderate (A) Negative    Ketones Urine Negative Negative mg/dL    Specific Gravity Urine 1.015 1.003 - 1.035    Blood Urine Small (A) Negative    pH Urine 6.0 5.0 - 7.0    Protein Albumin Urine Trace (A) Negative mg/dL    Urobilinogen Urine 1.0 0.2, 1.0 E.U./dL    Nitrite Urine Negative Negative    Leukocyte Esterase Urine Negative Negative   UA Microscopic with Reflex to Culture     Status: Abnormal   Result Value Ref Range    Bacteria Urine Moderate (A) None Seen /HPF    RBC Urine 0-2 0-2 /HPF /HPF    WBC Urine 5-10 (A) 0-5 /HPF /HPF    Squamous Epithelials Urine Few (A) None Seen /LPF    Narrative    Urine Culture not indicated     UA positive with moderate bacteria  Will start macrobid for 5 days  Push fluids  Pyridium PRN - urine will be dark orange   F/u in 1 week if persists or 3 days if worsening.           Return in about 1 week (around 9/15/2024) for with regular provider if symptoms persist.    REGULO Benavides CNP  [facility] IESHA Berger is a 64 year old male who presents to clinic today for the following health issues:  Chief Complaint   Patient presents with    Urgent Care     Pt present with redish painful urine, fever off and on, onset started yesterday early morning.      HPI    UTI    Onset of symptoms was  2day(s).  Course of illness is waxing and waning  Severity moderate  Current and associated symptoms dysuria, frequency, urgency, and blood in urine  Treatment and measures tried None  Predisposing factors include none  Patient denies flank pain, temperature > 101 degrees F., and vomiting        Review of Systems  Constitutional, HEENT, cardiovascular, pulmonary, GI, , musculoskeletal, neuro, skin, endocrine and psych systems are negative, except as otherwise noted.      Objective    BP (!) 135/92   Pulse 107   Temp 98.8  F (37.1  C) (Tympanic)   Resp 18   Wt 126.6 kg (279 lb)   SpO2 98%   BMI 38.91 kg/m    Physical Exam   GENERAL: alert and no distress  RESP: lungs clear to auscultation - no rales, rhonchi or wheezes  CV: regular rate and rhythm, normal S1 S2, no S3 or S4, no murmur, click or rub, no peripheral edema  MS: no gross musculoskeletal defects noted, no edema  BACK: no CVA tenderness, no paralumbar tenderness

## 2024-09-08 NOTE — PATIENT INSTRUCTIONS
Results for orders placed or performed in visit on 09/08/24   UA Macroscopic with reflex to Microscopic and Culture - Clinic Collect     Status: Abnormal    Specimen: Urine, Clean Catch   Result Value Ref Range    Color Urine Yellow Colorless, Straw, Light Yellow, Yellow    Appearance Urine Clear Clear    Glucose Urine Negative Negative mg/dL    Bilirubin Urine Moderate (A) Negative    Ketones Urine Negative Negative mg/dL    Specific Gravity Urine 1.015 1.003 - 1.035    Blood Urine Small (A) Negative    pH Urine 6.0 5.0 - 7.0    Protein Albumin Urine Trace (A) Negative mg/dL    Urobilinogen Urine 1.0 0.2, 1.0 E.U./dL    Nitrite Urine Negative Negative    Leukocyte Esterase Urine Negative Negative   UA Microscopic with Reflex to Culture     Status: Abnormal   Result Value Ref Range    Bacteria Urine Moderate (A) None Seen /HPF    RBC Urine 0-2 0-2 /HPF /HPF    WBC Urine 5-10 (A) 0-5 /HPF /HPF    Squamous Epithelials Urine Few (A) None Seen /LPF    Narrative    Urine Culture not indicated     UA positive with moderate bacteria  Will start macrobid for 5 days  Push fluids  Pyridium PRN - urine will be dark orange   F/u in 1 week if persists or 3 days if worsening.

## 2024-09-16 DIAGNOSIS — I10 ESSENTIAL HYPERTENSION, BENIGN: ICD-10-CM

## 2024-09-16 DIAGNOSIS — E78.5 HYPERLIPIDEMIA LDL GOAL <130: ICD-10-CM

## 2024-09-17 RX ORDER — LISINOPRIL AND HYDROCHLOROTHIAZIDE 12.5; 2 MG/1; MG/1
2 TABLET ORAL EVERY MORNING
Qty: 180 TABLET | Refills: 3 | Status: SHIPPED | OUTPATIENT
Start: 2024-09-17

## 2024-09-17 RX ORDER — PRAVASTATIN SODIUM 40 MG
40 TABLET ORAL DAILY
Qty: 90 TABLET | Refills: 3 | Status: SHIPPED | OUTPATIENT
Start: 2024-09-17

## 2024-09-22 ENCOUNTER — E-VISIT (OUTPATIENT)
Dept: INTERNAL MEDICINE | Facility: CLINIC | Age: 64
End: 2024-09-22
Payer: COMMERCIAL

## 2024-09-22 DIAGNOSIS — R30.0 DYSURIA: Primary | ICD-10-CM

## 2024-09-22 PROCEDURE — 99207 PR NO BILLABLE SERVICE THIS VISIT: CPT | Performed by: INTERNAL MEDICINE

## 2024-09-23 NOTE — TELEPHONE ENCOUNTER
Provider E-Visit time total (minutes): 2:00 minutes    Needs to be seen in person. (See my mychart response to him)    If he did not go to Urgent Care, then Please contact the patient.  OK to schedule him into any open same day or virtual spot tomorrow or this week with me or Chelly.

## 2024-09-24 ENCOUNTER — OFFICE VISIT (OUTPATIENT)
Dept: INTERNAL MEDICINE | Facility: CLINIC | Age: 64
End: 2024-09-24
Payer: COMMERCIAL

## 2024-09-24 VITALS
DIASTOLIC BLOOD PRESSURE: 88 MMHG | TEMPERATURE: 98.1 F | HEART RATE: 86 BPM | SYSTOLIC BLOOD PRESSURE: 138 MMHG | BODY MASS INDEX: 37.67 KG/M2 | HEIGHT: 71 IN | WEIGHT: 269.1 LBS | OXYGEN SATURATION: 98 % | RESPIRATION RATE: 20 BRPM

## 2024-09-24 DIAGNOSIS — Z20.822 SUSPECTED 2019 NOVEL CORONAVIRUS INFECTION: ICD-10-CM

## 2024-09-24 DIAGNOSIS — R30.0 DYSURIA: Primary | ICD-10-CM

## 2024-09-24 DIAGNOSIS — G93.31 POST VIRAL SYNDROME: ICD-10-CM

## 2024-09-24 LAB
ALBUMIN UR-MCNC: ABNORMAL MG/DL
APPEARANCE UR: CLEAR
BACTERIA #/AREA URNS HPF: ABNORMAL /HPF
BILIRUB UR QL STRIP: ABNORMAL
COLOR UR AUTO: YELLOW
GLUCOSE UR STRIP-MCNC: 500 MG/DL
HGB UR QL STRIP: ABNORMAL
KETONES UR STRIP-MCNC: NEGATIVE MG/DL
LEUKOCYTE ESTERASE UR QL STRIP: NEGATIVE
NITRATE UR QL: NEGATIVE
PH UR STRIP: 7 [PH] (ref 5–7)
RBC #/AREA URNS AUTO: ABNORMAL /HPF
SP GR UR STRIP: 1.01 (ref 1–1.03)
SQUAMOUS #/AREA URNS AUTO: ABNORMAL /LPF
UROBILINOGEN UR STRIP-ACNC: 1 E.U./DL
WBC #/AREA URNS AUTO: ABNORMAL /HPF

## 2024-09-24 PROCEDURE — 81001 URINALYSIS AUTO W/SCOPE: CPT | Performed by: INTERNAL MEDICINE

## 2024-09-24 PROCEDURE — 99213 OFFICE O/P EST LOW 20 MIN: CPT | Performed by: INTERNAL MEDICINE

## 2024-09-24 ASSESSMENT — PAIN SCALES - GENERAL: PAINLEVEL: MODERATE PAIN (5)

## 2024-09-24 NOTE — TELEPHONE ENCOUNTER
Scheduled pt for 1pm appt, Called and LM asking pt to confirm they can make appt time.      Klaudia Bowens, CMA

## 2024-09-24 NOTE — TELEPHONE ENCOUNTER
Called patient on cell number. LMTCB  at 9:28 am to verify receiving messages and can make the 1:00 appointment in clinic.. Patricia Ybarra, CMA

## 2024-09-24 NOTE — PROGRESS NOTES
"  Assessment & Plan     (R30.0) Dysuria  (primary encounter diagnosis)  Comment: Suspect the \"dark urine\" is probably due to the elevated bilirubin.  No evidence for actual bacterial infection given this urinalysis and the lack of urinary symptoms, no antibiotics indicated.  Suspect the discolored urine was most likely due to consequence of acute COVID infection.  Plan: UA Macroscopic with reflex to Microscopic and         Culture - Clinic Collect, UA Microscopic with         Reflex to Culture            (G93.31) Post viral syndrome  Comment: Almost certainly had a acute COVID infection 2 weeks ago.  Still has decreased energy and appetite, but overall improved considerably from his acute symptoms 2 weeks ago.  Obviously outside the window for treatment discussed the expected post COVID syndrome recovery timeline.  Plan:     (Z20.822) Suspected 2019 novel coronavirus infection  Comment: As above, almost certainly had an acute COVID infection 2 weeks ago that caused all of his troubles then and residual symptoms now.  Outside the window for any treatment.  Recommend waiting at least 2 months before receiving the most updated current COVID booster.  Told patient in the future to check himself for COVID should he experience any similar upper respiratory viral syndrome.  He will be a candidate for Paxlovid therapy, and should contact us as soon as possible to be considered for treatment, as our nursing staff can initiate this medicine via protocol via phone call  Plan:              BMI  Estimated body mass index is 37.53 kg/m  as calculated from the following:    Height as of this encounter: 1.803 m (5' 11\").    Weight as of this encounter: 122.1 kg (269 lb 1.6 oz).             Duane Berger is a 64 year old, presenting for the following health issues:  Urinary Problem        9/24/2024    12:47 PM   Additional Questions   Roomed by Patricia GYU CMA     History of Present Illness       Reason for visit:  " "Uti  Symptom onset:  1-3 days ago  Symptoms include:  Dark Urine  Symptom intensity:  Moderate  Symptom progression:  Staying the same  Had these symptoms before:  Yes  Has tried/received treatment for these symptoms:  Yes  Previous treatment was successful:  Yes  Prior treatment description:  Antibiotics  What makes it worse:  No  What makes it better:  No   He is taking medications regularly.     Acute onsets of upper respiratory illness starting on September 6 ago with fevers, chills nausea achiness.  Noticed his urine was darker than usual.  Went to urgent care on September 8, urinalysis showed no red blood cells, minimal white blood cells, and elevated bilirubin.  Was given 5 days of Macrobid.  Patient did not have any acute urinary symptoms then or now.  He denied urinary frequency, denied painful urination, denied nocturia, denied urinary hesitancy or urgency.  He was mainly concerned about \"dark urine\".  Did not have  Productive coughing.    He did not check himself for COVID    Urinary symptoms have slowly improved with urine \"less dark\".  Still denies fevers, chills, flank pain.    Denies urinary urgency or frequency.    2.) hypertension, well-controlled    3.) insomnia, stable on zolpidem.    4.) prediabetes/diabetes.  Stable    **I reviewed the information recorded in the patient's EPIC chart (including but not limited to medical history, surgical history, family history, problem list, medication list, and allergy list) and updated the information as indicated based on the patients reported information.         Review of Systems  Constitutional, HEENT, cardiovascular, pulmonary, gi and gu systems are negative, except as otherwise noted.      Objective    /88   Pulse 86   Temp 98.1  F (36.7  C) (Oral)   Resp 20   Ht 1.803 m (5' 11\")   Wt 122.1 kg (269 lb 1.6 oz)   SpO2 98%   BMI 37.53 kg/m    Body mass index is 37.53 kg/m .  Physical Exam   GENERAL alert and no distress  EYES:  Normal " sclera,conjunctiva, EOMI  HENT: oral and posterior pharynx without lesions or erythema, facies symmetric  NECK: Neck supple. No LAD, without thyroidmegaly.  RESP: Clear to ausculation bilaterally without wheezes or crackles. Normal BS in all fields.  CV: RRR normal S1S2 without murmurs, rubs or gallops.  LYMPH: no cervical lymph adenopathy appreciated  MS: extremities- no gross deformities of the visible extremities noted,   EXT:  no lower extremity edema  PSYCH: Alert and oriented times 3; speech- coherent  SKIN:  No obvious significant skin lesions on visible portions of face  RECTAL: Prostate nontender, no pain with palpation    Results for orders placed or performed in visit on 09/24/24   UA Macroscopic with reflex to Microscopic and Culture - Clinic Collect     Status: Abnormal    Specimen: Urine, Clean Catch   Result Value Ref Range    Color Urine Yellow Colorless, Straw, Light Yellow, Yellow    Appearance Urine Clear Clear    Glucose Urine 500 (A) Negative mg/dL    Bilirubin Urine Small (A) Negative    Ketones Urine Negative Negative mg/dL    Specific Gravity Urine 1.010 1.003 - 1.035    Blood Urine Trace (A) Negative    pH Urine 7.0 5.0 - 7.0    Protein Albumin Urine Trace (A) Negative mg/dL    Urobilinogen Urine 1.0 0.2, 1.0 E.U./dL    Nitrite Urine Negative Negative    Leukocyte Esterase Urine Negative Negative   UA Microscopic with Reflex to Culture     Status: Abnormal   Result Value Ref Range    Bacteria Urine None Seen None Seen /HPF    RBC Urine 0-2 0-2 /HPF /HPF    WBC Urine 0-5 0-5 /HPF /HPF    Squamous Epithelials Urine Few (A) None Seen /LPF    Narrative    Urine Culture not indicated            Signed Electronically by: Shayne Suggs MD

## 2024-09-24 NOTE — PATIENT INSTRUCTIONS
"   No evidence for urinary tract infection.       Continue all medications at the same doses.  Contact your usual pharmacy if you need refills.           Post Covid Syndrome:     Prolonged convalescence from viral infections are very common with potentially numerous generalized symptoms due to the systemic nature of both infection.    The typical recovery course from most common viral URIs and post viral syndrome with the most acute symptoms usually over in 5-7 days, but then a lingering convalescence phase with lingering symptoms such as fatigue, malaise, decreased appetite, mild joint or muscle aching, etc.    The timeframe for recovery from COVID-19 symptoms can last far longer than typical viral syndromes.  Symptoms associated with the \"post Covid 19 syndrome\" can include decreased appetite, shortness of breath, mood changes, palpitations, increased anxiety, insomnia, nonspecific fatigue, and \"brain fog\".    Unfortunately, there is no specific treatment for this post Covid syndrome other than supportive cares, getting adequate sleep, adequate nutrition, and adequate hydration.  Understanding that it may take more time to recover after COVID-19 infections is important  May return to activities as tolerated and prioritize their activities accordingly.    Return for any worsening or changing of symptoms or if their symptoms are interfering with their activities of daily living to any significant degree.     *  Your Covid test can remain positive for up to 90 days afterwards due to retained dead virus particles in the nasal passages.  You should not have any Covid testing during this time because your test may be positive, which would cause you troubles.  If you require any Covid testing during this time, you can meet the requirements with a copy of the positive test results and a letter from us declaring that you had an infection that has resolved.  Schedule a virtual visit appointment ( telephone or video) should " you need us to provide any such documentation.

## 2024-10-11 DIAGNOSIS — I10 ESSENTIAL HYPERTENSION, BENIGN: ICD-10-CM

## 2024-10-11 RX ORDER — METOPROLOL SUCCINATE 25 MG/1
25 TABLET, EXTENDED RELEASE ORAL DAILY
Qty: 90 TABLET | Refills: 2 | Status: SHIPPED | OUTPATIENT
Start: 2024-10-11

## 2024-10-12 DIAGNOSIS — K21.9 GASTROESOPHAGEAL REFLUX DISEASE WITHOUT ESOPHAGITIS: ICD-10-CM

## 2024-10-12 DIAGNOSIS — E11.9 TYPE 2 DIABETES MELLITUS WITHOUT COMPLICATION, WITHOUT LONG-TERM CURRENT USE OF INSULIN (H): ICD-10-CM

## 2024-10-13 ENCOUNTER — HEALTH MAINTENANCE LETTER (OUTPATIENT)
Age: 64
End: 2024-10-13

## 2024-10-14 ENCOUNTER — LAB (OUTPATIENT)
Dept: LAB | Facility: CLINIC | Age: 64
End: 2024-10-14
Payer: COMMERCIAL

## 2024-10-14 DIAGNOSIS — E11.9 TYPE 2 DIABETES MELLITUS WITHOUT COMPLICATION, WITHOUT LONG-TERM CURRENT USE OF INSULIN (H): ICD-10-CM

## 2024-10-14 DIAGNOSIS — I10 ESSENTIAL HYPERTENSION, BENIGN: Primary | ICD-10-CM

## 2024-10-14 DIAGNOSIS — Z12.5 SCREENING FOR PROSTATE CANCER: ICD-10-CM

## 2024-10-14 DIAGNOSIS — E78.5 HYPERLIPIDEMIA LDL GOAL <130: ICD-10-CM

## 2024-10-14 LAB
ALBUMIN SERPL BCG-MCNC: 4.4 G/DL (ref 3.5–5.2)
ALP SERPL-CCNC: 98 U/L (ref 40–150)
ALT SERPL W P-5'-P-CCNC: 57 U/L (ref 0–70)
ANION GAP SERPL CALCULATED.3IONS-SCNC: 12 MMOL/L (ref 7–15)
AST SERPL W P-5'-P-CCNC: 42 U/L (ref 0–45)
BASOPHILS # BLD AUTO: 0 10E3/UL (ref 0–0.2)
BASOPHILS NFR BLD AUTO: 0 %
BILIRUB DIRECT SERPL-MCNC: 0.29 MG/DL (ref 0–0.3)
BILIRUB SERPL-MCNC: 0.6 MG/DL
BUN SERPL-MCNC: 11.3 MG/DL (ref 8–23)
CALCIUM SERPL-MCNC: 9.5 MG/DL (ref 8.8–10.4)
CHLORIDE SERPL-SCNC: 100 MMOL/L (ref 98–107)
CHOLEST SERPL-MCNC: 202 MG/DL
CREAT SERPL-MCNC: 0.67 MG/DL (ref 0.67–1.17)
CREAT UR-MCNC: 33.6 MG/DL
EGFRCR SERPLBLD CKD-EPI 2021: >90 ML/MIN/1.73M2
EOSINOPHIL # BLD AUTO: 0.1 10E3/UL (ref 0–0.7)
EOSINOPHIL NFR BLD AUTO: 2 %
ERYTHROCYTE [DISTWIDTH] IN BLOOD BY AUTOMATED COUNT: 11.9 % (ref 10–15)
EST. AVERAGE GLUCOSE BLD GHB EST-MCNC: 180 MG/DL
FASTING STATUS PATIENT QL REPORTED: YES
FASTING STATUS PATIENT QL REPORTED: YES
GLUCOSE SERPL-MCNC: 132 MG/DL (ref 70–99)
HBA1C MFR BLD: 7.9 % (ref 0–5.6)
HCO3 SERPL-SCNC: 23 MMOL/L (ref 22–29)
HCT VFR BLD AUTO: 45.9 % (ref 40–53)
HDLC SERPL-MCNC: 37 MG/DL
HGB BLD-MCNC: 15.7 G/DL (ref 13.3–17.7)
IMM GRANULOCYTES # BLD: 0 10E3/UL
IMM GRANULOCYTES NFR BLD: 0 %
LDLC SERPL CALC-MCNC: 124 MG/DL
LYMPHOCYTES # BLD AUTO: 3.8 10E3/UL (ref 0.8–5.3)
LYMPHOCYTES NFR BLD AUTO: 55 %
MCH RBC QN AUTO: 30.5 PG (ref 26.5–33)
MCHC RBC AUTO-ENTMCNC: 34.2 G/DL (ref 31.5–36.5)
MCV RBC AUTO: 89 FL (ref 78–100)
MICROALBUMIN UR-MCNC: <12 MG/L
MICROALBUMIN/CREAT UR: NORMAL MG/G{CREAT}
MONOCYTES # BLD AUTO: 0.9 10E3/UL (ref 0–1.3)
MONOCYTES NFR BLD AUTO: 13 %
NEUTROPHILS # BLD AUTO: 2 10E3/UL (ref 1.6–8.3)
NEUTROPHILS NFR BLD AUTO: 29 %
NONHDLC SERPL-MCNC: 165 MG/DL
PLAT MORPH BLD: NORMAL
PLATELET # BLD AUTO: 301 10E3/UL (ref 150–450)
POTASSIUM SERPL-SCNC: 4 MMOL/L (ref 3.4–5.3)
PROT SERPL-MCNC: 7.3 G/DL (ref 6.4–8.3)
PSA SERPL DL<=0.01 NG/ML-MCNC: 0.88 NG/ML (ref 0–4.5)
RBC # BLD AUTO: 5.14 10E6/UL (ref 4.4–5.9)
RBC MORPH BLD: NORMAL
SODIUM SERPL-SCNC: 135 MMOL/L (ref 135–145)
TRIGL SERPL-MCNC: 206 MG/DL
WBC # BLD AUTO: 6.9 10E3/UL (ref 4–11)

## 2024-10-14 PROCEDURE — 82248 BILIRUBIN DIRECT: CPT

## 2024-10-14 PROCEDURE — 85025 COMPLETE CBC W/AUTO DIFF WBC: CPT

## 2024-10-14 PROCEDURE — 36415 COLL VENOUS BLD VENIPUNCTURE: CPT

## 2024-10-14 PROCEDURE — 82570 ASSAY OF URINE CREATININE: CPT

## 2024-10-14 PROCEDURE — G0103 PSA SCREENING: HCPCS

## 2024-10-14 PROCEDURE — 80061 LIPID PANEL: CPT

## 2024-10-14 PROCEDURE — 83036 HEMOGLOBIN GLYCOSYLATED A1C: CPT

## 2024-10-14 PROCEDURE — 82043 UR ALBUMIN QUANTITATIVE: CPT

## 2024-10-14 PROCEDURE — 80053 COMPREHEN METABOLIC PANEL: CPT

## 2024-10-14 RX ORDER — METFORMIN HYDROCHLORIDE 500 MG/1
1000 TABLET, EXTENDED RELEASE ORAL
Qty: 180 TABLET | Refills: 1 | Status: SHIPPED | OUTPATIENT
Start: 2024-10-14

## 2024-10-14 RX ORDER — OMEPRAZOLE 40 MG/1
40 CAPSULE, DELAYED RELEASE ORAL DAILY
Qty: 90 CAPSULE | Refills: 2 | Status: SHIPPED | OUTPATIENT
Start: 2024-10-14

## 2024-10-15 ENCOUNTER — OFFICE VISIT (OUTPATIENT)
Dept: INTERNAL MEDICINE | Facility: CLINIC | Age: 64
End: 2024-10-15
Payer: COMMERCIAL

## 2024-10-15 VITALS
SYSTOLIC BLOOD PRESSURE: 134 MMHG | OXYGEN SATURATION: 98 % | HEART RATE: 79 BPM | DIASTOLIC BLOOD PRESSURE: 84 MMHG | TEMPERATURE: 98.2 F | HEIGHT: 71 IN | BODY MASS INDEX: 37.73 KG/M2 | RESPIRATION RATE: 12 BRPM | WEIGHT: 269.5 LBS

## 2024-10-15 DIAGNOSIS — E78.5 HYPERLIPIDEMIA LDL GOAL <130: ICD-10-CM

## 2024-10-15 DIAGNOSIS — M19.042 PRIMARY OSTEOARTHRITIS OF BOTH HANDS: ICD-10-CM

## 2024-10-15 DIAGNOSIS — E11.9 TYPE 2 DIABETES MELLITUS WITHOUT COMPLICATION, WITHOUT LONG-TERM CURRENT USE OF INSULIN (H): Primary | ICD-10-CM

## 2024-10-15 DIAGNOSIS — E66.01 SEVERE OBESITY (BMI 35.0-35.9 WITH COMORBIDITY) (H): ICD-10-CM

## 2024-10-15 DIAGNOSIS — K21.9 GASTROESOPHAGEAL REFLUX DISEASE WITHOUT ESOPHAGITIS: ICD-10-CM

## 2024-10-15 DIAGNOSIS — I10 ESSENTIAL HYPERTENSION, BENIGN: ICD-10-CM

## 2024-10-15 DIAGNOSIS — E11.9 TYPE 2 DIABETES MELLITUS WITHOUT COMPLICATION, WITHOUT LONG-TERM CURRENT USE OF INSULIN (H): ICD-10-CM

## 2024-10-15 DIAGNOSIS — Z23 NEED FOR INFLUENZA VACCINATION: ICD-10-CM

## 2024-10-15 DIAGNOSIS — M19.041 PRIMARY OSTEOARTHRITIS OF BOTH HANDS: ICD-10-CM

## 2024-10-15 PROCEDURE — 99214 OFFICE O/P EST MOD 30 MIN: CPT | Mod: 25 | Performed by: INTERNAL MEDICINE

## 2024-10-15 PROCEDURE — 90471 IMMUNIZATION ADMIN: CPT | Performed by: INTERNAL MEDICINE

## 2024-10-15 PROCEDURE — 90673 RIV3 VACCINE NO PRESERV IM: CPT | Performed by: INTERNAL MEDICINE

## 2024-10-15 RX ORDER — HYDROCHLOROTHIAZIDE 12.5 MG/1
CAPSULE ORAL
Qty: 6 EACH | Refills: 1 | OUTPATIENT
Start: 2024-10-15

## 2024-10-15 RX ORDER — HYDROCHLOROTHIAZIDE 12.5 MG/1
CAPSULE ORAL
Qty: 6 EACH | Refills: 1 | Status: SHIPPED | OUTPATIENT
Start: 2024-10-15

## 2024-10-15 ASSESSMENT — PAIN SCALES - GENERAL: PAINLEVEL: NO PAIN (0)

## 2024-10-15 NOTE — PROGRESS NOTES
Assessment & Plan     (E11.9) Type 2 diabetes mellitus without complication, without long-term current use of insulin (H)  (primary encounter diagnosis)  Comment: needs better control.  After discussion with the patient, he admits that he was gotten away from his lower carb diet that has been so successful for his diabetes control.     I feel the patient will be able to improve his diet which will result in improved diabetes control.   Iuncrease metformin to 1000 mg daily.   Consider GLP-1 medication for diabetes and weight loss as next possible step (if covered)    Discussed importance of aggressive management of diabetes, including aggressive low cabr diet (one of the most powerful ways to control type II DM), performing regular glucose monitoring, (either with standard glucometer, or preferably personal continuous glucose monitor), medication compliance, regular laboratory monitoring at least every 6 months (or possibly more often if diabetes not at goal), and attending regular follow up appointments as ordered.    Aggressive diabetes management of diabetes will greatly reduce the future risk of diabetes related complications such as CAD, CVA, PVD, and retinopathy/neuropathy/nephropathy.  Based on level of diabetes control: testing frequency ONE TIME PER DAY with standard glucometer, but I would much prefer the continuous glucose monitor as he found this very helpful in managing his diabetes by giving constant real time feedback on all of hismeal choices.  He admist that he made much better food choices when he the feedback, especailly positive feedback.    Plan: Lipid panel reflex to direct LDL Fasting,         Hemoglobin A1c, Basic metabolic panel,         Continuous Glucose Sensor (FREESTYLE RONNIE 3         PLUS SENSOR) MISC            (E66.01,  Z68.35) Severe obesity (BMI 35.0-35.9 with comorbidity) (H)  Comment: Obesity is contributing to DM II, HTN.  Current BMI is: Body mass index is 37.59  kg/m ..  Reviewed weight patterns.   Obesity as a biological preventable and treatable disease, which is associated with significantly increased risk of many acute and chronic health conditions.   Obesity has now been recognized as a chronic disease by the American Medical Association.  Obesity has serious co-morbidities associated with obesity including increased risk for hypertension, stroke, coronary artery disease, dyslipidemia, Type II diabetes, depression, sleep apnea, cancers of the colon, breast and endometrium, obstructive sleep apnea, osteoarthritis and female infertility.  Recommended regular aerobic exercise, recommended improved diet aiming at lowering amount of processed foods, lower sugars, moderation/reduction of simple carbs and fat in the diet, establishing more regular meal times, always eating breakfast, front loading some of the calories and adding more protein to the diet.     Referral to Weight Loss Clinic for discussion of more aggressive measures for weight loss can be considered (including medical options (e.g. GLP-1, or appetite suppressants, etc.) or bariatric surgical procedures.   Plan:     (I10) Essential hypertension, benign  Comment: This condition is currently controlled on the current medical regimen.  Continue current therapy.   Plan: Lipid panel reflex to direct LDL Fasting,         Hemoglobin A1c, Basic metabolic panel            (K21.9) Gastroesophageal reflux disease without esophagitis  Comment: This condition is currently controlled on the current medical regimen.  Continue current therapy.   Plan:     (E78.5) Hyperlipidemia LDL goal <130  Comment: This condition is currently controlled on the current medical regimen.  Continue current therapy.   Discussed guidelines recommending a statin cholesterol lowering medication for any patient with either diabetes and/or vascular disease, aiming for a LDL goal under 100 for sure, ideally under 70, using whatever dose of statin  "tolerated.    Plan: Lipid panel reflex to direct LDL Fasting,         Hemoglobin A1c, Basic metabolic panel            (Z23) Need for influenza vaccination  Comment:   Plan: INFLUENZA VACCINE TRIVALENT(FLUBLOK)            (M19.041,  M19.042) Primary osteoarthritis of both hands  Comment:   Plan:      Return to see me in 6 months for annual physical and diabetes follow up, schedule a follow up visit sooner if needed for anything else.  Please get fasting labs done at the [facility] or any other [facility] lab 1-2 days before this appointment (schedule a \"lab appointment\")   Eat nothing for at least 8 hours prior to having these labs drawn.  Use [employer] or Call [phone] to schedule the appointment with me and lab appointment.         BMI  Estimated body mass index is 37.59 kg/m  as calculated from the following:    Height as of this encounter: 1.803 m (5' 11\").    Weight as of this encounter: 122.2 kg (269 lb 8 oz).             Duane Berger is a 64 year old, presenting for the following health issues:  Hypertension, Diabetes, and Lipids    History of Present Illness       Diabetes:   He presents for follow up of diabetes.    He is not checking blood glucose.         He has no concerns regarding his diabetes at this time.   He is not experiencing numbness or burning in feet, excessive thirst, blurry vision, weight changes or redness, sores or blisters on feet. The patient has not had a diabetic eye exam in the last 12 months.          He eats 2-3 servings of fruits and vegetables daily.He consumes 0 sweetened beverage(s) daily.He exercises with enough effort to increase his heart rate 60 or more minutes per day.  He exercises with enough effort to increase his heart rate 5 days per week.   He is taking medications regularly.     Hyperlipidemia Follow-Up    Are you regularly taking any medication or supplement to lower your cholesterol?   Yes- Pravastatin  Are you having muscle aches or " "other side effects that you think could be caused by your cholesterol lowering medication?  No    Hypertension Follow-up    Do you check your blood pressure regularly outside of the clinic? No   Are you following a low salt diet? Yes  Are your blood pressures ever more than 140 on the top number (systolic) OR more   than 90 on the bottom number (diastolic), for example 140/90? Not checking    The patient has had a history of obesity.  Reviewed the weigth curves.   Their current BMI is:  Body mass index is 37.59 kg/m .    Discussed current eating and exercise habits.       4/12/21  BMI 41.3  3/24/2023 BMI 34.55  9/1/2023 BMI 36.8  10/15/2024 BMI 37.59    Reviewed weights in chart:  Wt Readings from Last 10 Encounters:   10/15/24 122.2 kg (269 lb 8 oz)   09/24/24 122.1 kg (269 lb 1.6 oz)   09/08/24 126.6 kg (279 lb)   03/20/24 122.2 kg (269 lb 4.8 oz)   09/01/23 119.7 kg (264 lb)   03/24/23 112.4 kg (247 lb 11.2 oz)   09/09/22 109.9 kg (242 lb 4.8 oz)   04/01/22 112 kg (247 lb)   12/08/21 127.9 kg (282 lb)   04/12/21 134.4 kg (296 lb 6.4 oz)        **I reviewed the information recorded in the patient's EPIC chart (including but not limited to medical history, surgical history, family history, problem list, medication list, and allergy list) and updated the information as indicated based on the patients reported information.         Review of Systems  Constitutional, neuro, ENT, endocrine, pulmonary, cardiac, gastrointestinal, genitourinary, musculoskeletal, integument and psychiatric systems are negative, except as otherwise noted.      Objective    /84   Pulse 79   Temp 98.2  F (36.8  C) (Oral)   Resp 12   Ht 1.803 m (5' 11\")   Wt 122.2 kg (269 lb 8 oz)   SpO2 98%   BMI 37.59 kg/m    Body mass index is 37.59 kg/m .  Physical Exam   GENERAL alert and no distress  EYES:  Normal sclera,conjunctiva, EOMI  HENT: facies symmetric  MS: extremities- no gross deformities of the visible extremities noted,   PSYCH: " Alert and oriented times 3; speech- coherent  SKIN:  No obvious significant skin lesions on visible portions of face   NEURO:  Normal facial movements.  Appears to move normally           The longitudinal plan of care for the diagnosis(es)/condition(s) as documented were addressed during this visit. Due to the added complexity in care, I will continue to support Ab in the subsequent management and with ongoing continuity of care.      Signed Electronically by: Shayne Suggs MD

## 2024-10-15 NOTE — PATIENT INSTRUCTIONS
"   Annual influenza vaccine (flu shot) given today.  Get this every fall.      Wait to receive the most updated covid booster until November due to the recent probable Covid infection you had.        Diabetes needs better control:      --increase Metfomrin to 1000 mg at evening meal.     Contineu to work on your diet, lowering the intake of \"simple carbohydrates\" (e.g. White bread, white rice, pasta, noodles, potatoes, snack foods, regular soda, juices (except fresh squeezed), cakes, cookies, candy, etc.) as best possilbe.         --use the continuous glucose monitor if able.      --Goal for diabetes is to keep the glucose time in target range between  approximately 70% of the time or better with few, if any regular low readings.          Continue all other medications at the same doses.  Contact your usual pharmacy if you need refills.      Return to see me in 6 months, schedule a follow up visit sooner if needed for anything else.  Please get fasting labs done at the [facility] or any other [facility] lab 1-2 days before this appointment (schedule a \"lab appointment\")   Eat nothing for at least 8 hours prior to having these labs drawn.  Use [employer] or Call [phone] to schedule the appointment with me and lab appointment.         5 GOALS IN MANAGING DIABETES (i.e. to give the best chance to prevent diabetic complications and vascular disease):     1.  Aggressive diabetic management.  The target for A1C (3 months average blood sugar) is ideally below 6.5, preferably below 7.5    Your diabetes is under good control.      Lab Results   Component Value Date    A1C 7.9 10/14/2024    A1C 6.5 03/13/2024    A1C 6.0 08/23/2023    A1C 5.9 03/10/2023    A1C 5.6 08/24/2022    A1C 5.8 03/25/2022    A1C 8.9 12/08/2021    A1C 6.3 08/18/2020    A1C 6.0 08/30/2019    A1C 6.0 03/05/2019    A1C 5.9 10/30/2014    A1C 5.7 09/23/2013    A1C 6.1 09/26/2012    A1C 6.2 09/28/2009    A1C 5.9 04/08/2008    A1C " "5.3 09/27/2007       2.  Aggressive blood pressure control, under 130/80 ideally.  Using medications if needed.    Your blood pressure is under good control    BP Readings from Last 4 Encounters:   10/15/24 134/84   09/24/24 138/88   09/08/24 (!) 135/92   03/20/24 132/84       3.  Aggressive LDL cholesterol (bad cholesterol) lowering as needed.  Your goal is an LDL under 100 for sure, preferably under 70.     *  All patients with diabetes are recommended to be on a \"statin\" cholesterol lowering medication regardless of the cholesterol levels to give the best chance at avoiding vascular disease.      New guidelines identify four high-risk groups who could benefit from statins:   *people with pre-existing heart disease, such as those who have had a heart attack;   *people ages 40 to 75 who have diabetes of any type  *patients ages 40 to 75 with at least a 7.5% risk of developing cardiovascular disease over the next decade, according to a formula described in the guidelines  *patients with the sort of super-high cholesterol that sometimes runs in families, as evidenced by an LDL of 190 milligrams per deciliter or higher    *  Your cholesterol levels are well controlled.    Recent Labs   Lab Test 10/14/24  0906 08/23/23  0738   CHOL 202* 194   HDL 37* 36*   * 90   TRIG 206* 342*       --LDL (\"bad\" cholesterol): desired under 100 in diabetes.  Always make better food choices ( lower fats, etc.), statins are recommended for almost all diabetics, regardless of LDL levels.  --HDL (\"good\") cholesterol: (normal above 40 for men, above 50 for women).  Best way to improve the HDL level is through regular physical exercise. There are no medications to raise HDL levels.  --Triglycerides (desirable under 150): triglycerides are more about metabolic issues, best way to improve this is through better diet choices, emphasizing reducing the intake of \"simple carbohydrates\" (e.g. White bread, white rice, pasta, noodles, " "potatoes, snack foods, regular soda, juices (except fresh squeezed), cakes, cookies, candy, etc.) as best possible.  Medications may be considered for triglyceride levels routinely above 400.    4.  No smoking    5.  Consider daily preventative Aspirin once per day, every day over age 50 unless there is a specific reason that you cannot take aspirin.       *You should take Aspirin 81 mg once per day, unless you have a reason NOT to take aspirin (i.e. side effect, excessive bruising or bleeding, taking another \"blood tinning\" medication, etc.)       DIABETES REMINDERS:   1. Check your blood glucose regularly either with standard glucometer or personal continuous glucose monitor.    2) Your blood sugar goals:  before eating and  two hours after eating.  If using personal continuous glucose monitor, the goal is Time in the Target range ( ) of 70-75% with very few (under 2%) Below target.    3) Always be prepared to treat low blood sugar symptoms should it happen. Keep a sugar-containing beverage or food nearby.  4) When to call your clinic:     Blood sugar over 400     If you have 2 to 3 low blood sugars (under 70) in a row    Low readings the same time of day several days in a row  5) When to call 911: If your low blood glucose symptoms do not get better with treatment, or if you/someone else is unable to give you treatment.  6) Work with a Certified Diabetes Educator to assist you with your diabetes management  7) Contact us if you have ANY questions about your medications or instructions, or have problems with getting prescriptions filled.       "

## 2024-10-15 NOTE — TELEPHONE ENCOUNTER
His insurance will not cover continuous glucose monitor as before.  He said in the office visit that he did not want to pay for them if not covered

## 2025-02-02 DIAGNOSIS — F51.01 PRIMARY INSOMNIA: ICD-10-CM

## 2025-02-02 RX ORDER — ZOLPIDEM TARTRATE 10 MG/1
TABLET ORAL
Qty: 30 TABLET | Refills: 2 | Status: SHIPPED | OUTPATIENT
Start: 2025-02-02

## 2025-04-15 DIAGNOSIS — E78.5 HYPERLIPIDEMIA LDL GOAL <130: Primary | ICD-10-CM

## 2025-04-15 DIAGNOSIS — E11.9 TYPE 2 DIABETES MELLITUS WITHOUT COMPLICATION, WITHOUT LONG-TERM CURRENT USE OF INSULIN (H): ICD-10-CM

## 2025-04-15 DIAGNOSIS — I10 ESSENTIAL HYPERTENSION, BENIGN: ICD-10-CM

## 2025-04-16 RX ORDER — METFORMIN HYDROCHLORIDE 500 MG/1
1000 TABLET, EXTENDED RELEASE ORAL
Qty: 180 TABLET | Refills: 0 | Status: SHIPPED | OUTPATIENT
Start: 2025-04-16

## 2025-04-26 DIAGNOSIS — F51.01 PRIMARY INSOMNIA: ICD-10-CM

## 2025-04-26 DIAGNOSIS — E11.9 TYPE 2 DIABETES MELLITUS WITHOUT COMPLICATION, WITHOUT LONG-TERM CURRENT USE OF INSULIN (H): ICD-10-CM

## 2025-04-27 RX ORDER — METFORMIN HYDROCHLORIDE 500 MG/1
1000 TABLET, EXTENDED RELEASE ORAL
Qty: 180 TABLET | Refills: 0 | Status: SHIPPED | OUTPATIENT
Start: 2025-04-27

## 2025-04-27 RX ORDER — ZOLPIDEM TARTRATE 10 MG/1
TABLET ORAL
Qty: 30 TABLET | Refills: 2 | Status: SHIPPED | OUTPATIENT
Start: 2025-04-27

## 2025-05-03 ENCOUNTER — HEALTH MAINTENANCE LETTER (OUTPATIENT)
Age: 65
End: 2025-05-03

## 2025-05-14 ENCOUNTER — LAB (OUTPATIENT)
Dept: LAB | Facility: CLINIC | Age: 65
End: 2025-05-14
Payer: COMMERCIAL

## 2025-05-14 DIAGNOSIS — E11.9 TYPE 2 DIABETES MELLITUS WITHOUT COMPLICATION, WITHOUT LONG-TERM CURRENT USE OF INSULIN (H): ICD-10-CM

## 2025-05-14 DIAGNOSIS — I10 ESSENTIAL HYPERTENSION, BENIGN: ICD-10-CM

## 2025-05-14 DIAGNOSIS — E78.5 HYPERLIPIDEMIA LDL GOAL <130: ICD-10-CM

## 2025-05-14 LAB
ANION GAP SERPL CALCULATED.3IONS-SCNC: 12 MMOL/L (ref 7–15)
BUN SERPL-MCNC: 16.4 MG/DL (ref 8–23)
CALCIUM SERPL-MCNC: 9.9 MG/DL (ref 8.8–10.4)
CHLORIDE SERPL-SCNC: 96 MMOL/L (ref 98–107)
CHOLEST SERPL-MCNC: 227 MG/DL
CREAT SERPL-MCNC: 0.82 MG/DL (ref 0.67–1.17)
EGFRCR SERPLBLD CKD-EPI 2021: >90 ML/MIN/1.73M2
EST. AVERAGE GLUCOSE BLD GHB EST-MCNC: 146 MG/DL
FASTING STATUS PATIENT QL REPORTED: YES
FASTING STATUS PATIENT QL REPORTED: YES
GLUCOSE SERPL-MCNC: 127 MG/DL (ref 70–99)
HBA1C MFR BLD: 6.7 % (ref 0–5.6)
HCO3 SERPL-SCNC: 24 MMOL/L (ref 22–29)
HDLC SERPL-MCNC: 38 MG/DL
LDLC SERPL CALC-MCNC: 122 MG/DL
NONHDLC SERPL-MCNC: 189 MG/DL
POTASSIUM SERPL-SCNC: 5.2 MMOL/L (ref 3.4–5.3)
SODIUM SERPL-SCNC: 132 MMOL/L (ref 135–145)
TRIGL SERPL-MCNC: 334 MG/DL

## 2025-05-14 PROCEDURE — 36415 COLL VENOUS BLD VENIPUNCTURE: CPT

## 2025-05-14 PROCEDURE — 80048 BASIC METABOLIC PNL TOTAL CA: CPT

## 2025-05-14 PROCEDURE — 80061 LIPID PANEL: CPT

## 2025-05-14 PROCEDURE — 83036 HEMOGLOBIN GLYCOSYLATED A1C: CPT

## 2025-05-20 ENCOUNTER — OFFICE VISIT (OUTPATIENT)
Dept: INTERNAL MEDICINE | Facility: CLINIC | Age: 65
End: 2025-05-20
Payer: COMMERCIAL

## 2025-05-20 VITALS
RESPIRATION RATE: 14 BRPM | TEMPERATURE: 97.6 F | SYSTOLIC BLOOD PRESSURE: 145 MMHG | HEART RATE: 86 BPM | DIASTOLIC BLOOD PRESSURE: 88 MMHG | BODY MASS INDEX: 39.9 KG/M2 | WEIGHT: 285 LBS | OXYGEN SATURATION: 99 % | HEIGHT: 71 IN

## 2025-05-20 DIAGNOSIS — Z12.5 SCREENING FOR PROSTATE CANCER: ICD-10-CM

## 2025-05-20 DIAGNOSIS — Z12.11 SCREEN FOR COLON CANCER: ICD-10-CM

## 2025-05-20 DIAGNOSIS — E66.01 SEVERE OBESITY (BMI 35.0-35.9 WITH COMORBIDITY) (H): ICD-10-CM

## 2025-05-20 DIAGNOSIS — M19.041 PRIMARY OSTEOARTHRITIS OF BOTH HANDS: ICD-10-CM

## 2025-05-20 DIAGNOSIS — E11.9 TYPE 2 DIABETES MELLITUS WITHOUT COMPLICATION, WITHOUT LONG-TERM CURRENT USE OF INSULIN (H): Primary | ICD-10-CM

## 2025-05-20 DIAGNOSIS — M19.042 PRIMARY OSTEOARTHRITIS OF BOTH HANDS: ICD-10-CM

## 2025-05-20 DIAGNOSIS — F51.01 PRIMARY INSOMNIA: ICD-10-CM

## 2025-05-20 DIAGNOSIS — I10 ESSENTIAL HYPERTENSION, BENIGN: ICD-10-CM

## 2025-05-20 DIAGNOSIS — E78.5 HYPERLIPIDEMIA LDL GOAL <130: ICD-10-CM

## 2025-05-20 PROCEDURE — 3077F SYST BP >= 140 MM HG: CPT | Performed by: INTERNAL MEDICINE

## 2025-05-20 PROCEDURE — 99214 OFFICE O/P EST MOD 30 MIN: CPT | Performed by: INTERNAL MEDICINE

## 2025-05-20 PROCEDURE — 1125F AMNT PAIN NOTED PAIN PRSNT: CPT | Performed by: INTERNAL MEDICINE

## 2025-05-20 PROCEDURE — G2211 COMPLEX E/M VISIT ADD ON: HCPCS | Performed by: INTERNAL MEDICINE

## 2025-05-20 PROCEDURE — 3079F DIAST BP 80-89 MM HG: CPT | Performed by: INTERNAL MEDICINE

## 2025-05-20 RX ORDER — METOPROLOL SUCCINATE 50 MG/1
50 TABLET, EXTENDED RELEASE ORAL DAILY
Qty: 90 TABLET | Refills: 1 | Status: SHIPPED | OUTPATIENT
Start: 2025-05-20

## 2025-05-20 RX ORDER — LISINOPRIL AND HYDROCHLOROTHIAZIDE 12.5; 2 MG/1; MG/1
2 TABLET ORAL EVERY MORNING
Qty: 180 TABLET | Refills: 1 | Status: SHIPPED | OUTPATIENT
Start: 2025-05-20

## 2025-05-20 RX ORDER — METFORMIN HYDROCHLORIDE 500 MG/1
1000 TABLET, EXTENDED RELEASE ORAL
Qty: 180 TABLET | Refills: 1 | Status: SHIPPED | OUTPATIENT
Start: 2025-05-20

## 2025-05-20 RX ORDER — ROSUVASTATIN CALCIUM 20 MG/1
20 TABLET, COATED ORAL DAILY
Qty: 90 TABLET | Refills: 3 | Status: SHIPPED | OUTPATIENT
Start: 2025-05-20

## 2025-05-20 RX ORDER — CELECOXIB 200 MG/1
200 CAPSULE ORAL 2 TIMES DAILY PRN
Qty: 180 CAPSULE | Refills: 1 | Status: SHIPPED | OUTPATIENT
Start: 2025-05-20

## 2025-05-20 ASSESSMENT — PAIN SCALES - GENERAL: PAINLEVEL_OUTOF10: SEVERE PAIN (7)

## 2025-05-20 NOTE — PATIENT INSTRUCTIONS
IF GLP-1 MEDICATIONS ARE AN OPTION FOR YOU:  Consider a GLP-1 medication (Tirzepatide/Mounjaro) help reduce weight, control diabetes/pre-diabetes, and also reduce risk of future cardiac events.    Start Tirzepatide (Tirzepatide/Mounjaro) 2.5 mg once per week for 4 weeks. The dose of this medication will be titrate upwards over months as indicated by your clinical response.     Main side effects include ( but are not limited to) nausea, vomiting, stomach upset, and very rarely pancreatitis.  Stop the medication and contact us if you develop any concerning possible side effects    One potential main obstacle with these medications can be expense.  Insurance coverage can vary and these medication are extremely expensive if not covered.  Your employer will determine the criteria for coverage of these medications.      Follow up with me via e-visit after your first 4 weeks on the medication weeks regardless of how you feel, return sooner if needed.   Use [employer] or Call [phone] to schedule the appointment with me.          Change Pravastatin to rosuvastatin for better LDL cholesterol lowering. You took this medication in [id number], but had to change when it was not on your formulary.  Since that time, this medicaiton is now generic and inexpensive.       Blood pressure needs better control, increase metoprolol to 50 mg once per day.      Continue all other medications at the same doses.  Contact your usual pharmacy if you need refills.      Investigate a Shingrix shingles vaccine with your pharmacist .  They can tell you the coverage and cost and then give it to you if the price is acceptable.    Medicare sometimes does not cover these Shingrix shingles vaccines, and with Medicare insurance it is usually cheaper to receive this shingles vaccine from a pharmacist in a pharmacy rather than in our clinic.    At this time, you only need the 2 Shingrix vaccines and then you are done.        Return to see me in 6  "months, schedule a follow up visit sooner if needed for anything else.  Please get fasting labs done at the [facility] or any other [facility] lab 1-2 days before this appointment (schedule a \"lab appointment\")   Eat nothing for at least 8 hours prior to having these labs drawn.  Use [employer] or Call [phone] to schedule the appointment with me and lab appointment.       ColoGuard Colon cancer screening:     The ColoGuard stool test is good noninvasive way to screen for colon cancer, in average risk individuals.       I placed an order for a test to be mailed to your home address directly from the [employer].    You should NOT use ColoGuard for colon cancer screening if you have a family history of colon cancer or if you have a history of pre-cancerous polyps, you should have a colonoscopy if you have either of these situations.      The ColoGuard collection kit will be mailed to your home address by the [employer].  Follow the instructions for collecting the specimen.      Return the test kit in the enclosed mailing envelope directly to the [employer].    If this test is being mailed back in the cold weather months, Please make sure to place the return envelope into an indoor mailbox to prevent freezing.    If the sample freezes, it could invalidate the results resulting in a need to repeat the testing again.        I will inform you about the results when they return, typically a couple of weeks.     If the ColoGuard test is Negative, then reconsider colon cancer screening in 3 years, whether it be colonoscopy or another ColoGuard stool test.       If the ColoGuard test is Positive, this does NOT mean that you have colon cancer, it simply means that you will need to have a colonoscopy.  Most of the time, pre-cancerous polyps can turn this test positive.           "

## 2025-05-20 NOTE — PROGRESS NOTES
Assessment & Plan     (E11.9) Type 2 diabetes mellitus without complication, without long-term current use of insulin (H)  (primary encounter diagnosis)  Comment: Overall patient under good control, the patient will be excellent candidate for GLP-1 medication because of his diabetes and his ongoing near morbid obesity, and for additional cardiac rate disease risk reduction.  If GLP-1 medicines are an option, start Mounjaro 2.5 mg once per week for the first month.  Schedule an e-visit after the first 1 month of the medicine to review side effects and effect on glucose readings.  Titrate upwards after that point.  Reviewed the basic side effects of these medications and mechanism of action.  Plan: rosuvastatin (CRESTOR) 20 MG tablet,         tirzepatide (MOUNJARO) 2.5 MG/0.5ML SOAJ         auto-injector pen, metFORMIN (GLUCOPHAGE XR)         500 MG 24 hr tablet, Lipid panel reflex to         direct LDL Fasting, Hemoglobin A1c, Basic         metabolic panel, CBC with platelets, Albumin         Random Urine Quantitative with Creat Ratio, ALT            (E66.01,  Z68.35) Severe obesity (BMI 35.0-35.9 with comorbidity) (H)  Comment: Obesity is contributing to diabetes and hypertension.  Current BMI is: Body mass index is 39.75 kg/m ..  Reviewed weight patterns.   Obesity as a biological preventable and treatable disease, which is associated with significantly increased risk of many acute and chronic health conditions.   Obesity has now been recognized as a chronic disease by the [employer] Association.  Obesity has serious co-morbidities associated with obesity including increased risk for hypertension, stroke, coronary artery disease, dyslipidemia, Type II diabetes, depression, sleep apnea, cancers of the colon, breast and endometrium, obstructive sleep apnea, osteoarthritis and female infertility.  Recommended regular aerobic exercise, recommended improved diet aiming at lowering amount of processed foods,  lower sugars, moderation/reduction of simple carbs and fat in the diet, establishing more regular meal times, always eating breakfast, front loading some of the calories and adding more protein to the diet.     Referral to [facility] for discussion of more aggressive measures for weight loss can be considered (including medical options (e.g. GLP-1, or appetite suppressants, etc.) or bariatric surgical procedures.   Plan: tirzepatide (MOUNJARO) 2.5 MG/0.5ML SOAJ         auto-injector pen, Lipid panel reflex to direct        LDL Fasting, Hemoglobin A1c, Basic metabolic         panel, CBC with platelets, Albumin Random Urine        Quantitative with Creat Ratio, ALT            (M19.041,  M19.042) Primary osteoarthritis of both hands  Comment: This condition is currently controlled on the current medical regimen.  Continue current therapy.   Plan: celecoxib (CELEBREX) 200 MG capsule            (I10) Essential hypertension, benign  Comment: Blood pressure needs slightly better control, increase metoprolol.  Continue lisinopril hydrochlorothiazide same dose.  Hopefully with weight loss the patient's blood pressure improved.  Plan: lisinopril-hydrochlorothiazide (ZESTORETIC)         20-12.5 MG tablet, metoprolol succinate ER         (TOPROL XL) 50 MG 24 hr tablet, Lipid panel         reflex to direct LDL Fasting, Hemoglobin A1c,         Basic metabolic panel, CBC with platelets,         Albumin Random Urine Quantitative with Creat         Ratio, ALT            (E78.5) Hyperlipidemia LDL goal <130  Comment: LDL still not at goal.  He was previously taking rosuvastatin 13 years ago without side effects when we had to change pravastatin due to formulary issues.  Now that rosuvastatin is a generic medication and affordable, stop pravastatin, changed to rosuvastatin 20 mg once daily.  Discussed guidelines recommending a statin cholesterol lowering medication for any patient with either diabetes and/or vascular disease,  "aiming for a LDL goal under 100 for sure, ideally under 70, using whatever dose of statin tolerated.    Plan: rosuvastatin (CRESTOR) 20 MG tablet, Lipid         panel reflex to direct LDL Fasting, Hemoglobin         A1c, Basic metabolic panel, CBC with platelets,        Albumin Random Urine Quantitative with Creat         Ratio, ALT            (F51.01) Primary insomnia  Comment: This condition is currently controlled on the current medical regimen.  Continue current therapy.   Plan:     (Z12.5) Screening for prostate cancer  Comment: Discussed prostate cancer screening and PSA blood test.  Discussed that an elevated PSA blood test can be caused by things other than prostate cancer, including infection/inflammation, BPH, and recent sexual activity; and that elevated PSA blood test may require further investigation with a urologist   Plan: Prostate Specific Antigen Screen            (Z12.11) Screen for colon cancer  Comment: I discussed current recommendations for routine colon cancer screening starting at age 45 (age 35 for family history of colon cancer).  Recommending colonoscopy as gold standard test, but the patient is declining to do colonoscopy at this time.   Discussed ColoGuard stool test for routine colon cancer screening, and will order it if covered by their insurance.   If result Negative, repeat ColoGuard testing every 3 years (or eventually consider colonoscopy)  If result Positive, does not necessarily mean colon cancer, but means they need colonoscopy.    Plan: [employer])                     BMI  Estimated body mass index is 39.75 kg/m  as calculated from the following:    Height as of this encounter: 1.803 m (5' 11\").    Weight as of this encounter: 129.3 kg (285 lb).             Duane Berger is a 64 year old, presenting for the following health issues:  Diabetes    History of Present Illness       Diabetes:   He presents for follow up of diabetes.    He is not checking blood " glucose.         He has no concerns regarding his diabetes at this time.   He is not experiencing numbness or burning in feet, excessive thirst, blurry vision, weight changes or redness, sores or blisters on feet. The patient has not had a diabetic eye exam in the last 12 months.          He eats 2-3 servings of fruits and vegetables daily.He consumes 0 sweetened beverage(s) daily.He exercises with enough effort to increase his heart rate 60 or more minutes per day.  He exercises with enough effort to increase his heart rate 5 days per week.   He is taking medications regularly.        Diabetes:  In regards specifically to the patient's diabetes, they reports no unusual symptoms.  Medication compliance: good  Diabetic diet compliance: good    Patient reports no significant episodes of hypo- or hyperglycemia    Diabetic complications: none     Most  recent labs:    Lab Results   Component Value Date    A1C 6.7 05/14/2025    A1C 7.9 10/14/2024    A1C 6.5 03/13/2024    A1C 6.0 08/23/2023    A1C 5.9 03/10/2023    A1C 5.6 08/24/2022    A1C 5.8 03/25/2022    A1C 8.9 12/08/2021    A1C 6.3 08/18/2020    A1C 6.0 08/30/2019    A1C 6.0 03/05/2019    A1C 5.9 10/30/2014    A1C 5.7 09/23/2013    A1C 6.1 09/26/2012    A1C 6.2 09/28/2009    A1C 5.9 04/08/2008    A1C 5.3 09/27/2007        Hypertension:  History of hypertension, on medication.  No reported side effects from medications.    Reviewed last 6 BP readings in chart:  BP Readings from Last 6 Encounters:   05/20/25 (!) 145/88   10/15/24 134/84   09/24/24 138/88   09/08/24 (!) 135/92   03/20/24 132/84   09/01/23 128/72     No active cardiac complaints or symptoms with exercise.     The patient has had a history of obesity.  Reviewed the weigth curves.   Their current BMI is:  Body mass index is 39.75 kg/m .    Discussed current eating and exercise habits.     Reviewed weights in chart:  Wt Readings from Last 10 Encounters:   05/20/25 129.3 kg (285 lb)   10/15/24 122.2 kg (269  "lb 8 oz)   09/24/24 122.1 kg (269 lb 1.6 oz)   09/08/24 126.6 kg (279 lb)   03/20/24 122.2 kg (269 lb 4.8 oz)   09/01/23 119.7 kg (264 lb)   03/24/23 112.4 kg (247 lb 11.2 oz)   09/09/22 109.9 kg (242 lb 4.8 oz)   04/01/22 112 kg (247 lb)   12/08/21 127.9 kg (282 lb)        Hyperlipidemia:  Has history of hyperlipidemia.    The patient is taking a medication for this.  Denies any significant side effects from his medication.      Latest labs reviewed:    Recent Labs   Lab Test 05/14/25  0732 10/14/24  0906   CHOL 227* 202*   HDL 38* 37*   * 124*   TRIG 334* 206*        Lab Results   Component Value Date    AST 42 10/14/2024    AST 84 08/18/2020           Review of Systems  Constitutional, HEENT, cardiovascular, pulmonary, gi and gu systems are negative, except as otherwise noted.      Objective    BP (!) 145/88   Pulse 86   Temp 97.6  F (36.4  C) (Temporal)   Resp 14   Ht 1.803 m (5' 11\")   Wt 129.3 kg (285 lb)   SpO2 99%   BMI 39.75 kg/m    Body mass index is 39.75 kg/m .  Physical Exam   GENERAL alert and no distress  EYES:  Normal sclera,conjunctiva, EOMI  HENT: facies symmetric  MS: extremities- no gross deformities of the visible extremities noted,   PSYCH: Alert and oriented times 3; speech- coherent  SKIN:  No obvious significant skin lesions on visible portions of face   NEURO:  Normal facial movements.  Appears to move normally           The longitudinal plan of care for the diagnosis(es)/condition(s) as documented were addressed during this visit. Due to the added complexity in care, I will continue to support Ab in the subsequent management and with ongoing continuity of care.      Signed Electronically by: Shayne Suggs MD    "

## 2025-07-22 ENCOUNTER — TELEPHONE (OUTPATIENT)
Dept: INTERNAL MEDICINE | Facility: CLINIC | Age: 65
End: 2025-07-22
Payer: COMMERCIAL

## 2025-07-22 NOTE — TELEPHONE ENCOUNTER
Patient Quality Outreach    Patient is due for the following:   Diabetes -    Hypertension -      Action(s) Taken:   No follow up needed at this time.    Type of outreach:  none  These quality measures were addressed at  5-20-25 visit    Questions for provider review:    None         Patricia Matamoros CMA  Chart routed to None.

## 2025-07-29 DIAGNOSIS — K21.9 GASTROESOPHAGEAL REFLUX DISEASE WITHOUT ESOPHAGITIS: ICD-10-CM

## 2025-07-29 RX ORDER — OMEPRAZOLE 40 MG/1
40 CAPSULE, DELAYED RELEASE ORAL DAILY
Qty: 90 CAPSULE | Refills: 1 | Status: SHIPPED | OUTPATIENT
Start: 2025-07-29

## 2025-07-29 NOTE — TELEPHONE ENCOUNTER
Prescription approved per [facility] [place], [place] Refill Protocol.  Misty Contreras RN  [facility]

## (undated) DEVICE — BNDG ELASTIC 6" DBL LENGTH UNSTERILE 6611-16

## (undated) DEVICE — DRAPE IOBAN INCISE 23X17" 6650EZ

## (undated) DEVICE — SU VICRYL 2-0 CP-1 27" UND J266H

## (undated) DEVICE — SU MONOCRYL 3-0 PS-2 27" Y427H

## (undated) DEVICE — BLADE SAW SAGITTAL STRK 13X90X1.27MM HD SYS 6 6113-127-090

## (undated) DEVICE — DRSG ABDOMINAL 07 1/2X8" 7197D

## (undated) DEVICE — SET HANDPIECE INTERPULSE W/COAXIAL FAN SPRAY TIP 0210118000

## (undated) DEVICE — BNDG COBAN 6"X5YDS STERILE

## (undated) DEVICE — SU STRATAFIX PDS PLUS 0 CT-1 18" SXPP1A401

## (undated) DEVICE — CATH TRAY FOLEY COUDE SURESTEP 16FR W/DRN BAG LATEX A304416A

## (undated) DEVICE — LINEN ORTHO ACL PACK 5447

## (undated) DEVICE — SOL WATER IRRIG 1000ML BOTTLE 2F7114

## (undated) DEVICE — TOURNIQUET CUFF 30" REPRO BLUE 60-7070-105

## (undated) DEVICE — Device

## (undated) DEVICE — SU VICRYL 0 CP-1 27" J467H

## (undated) DEVICE — DRSG GAUZE 4X4" 8044

## (undated) DEVICE — SU ETHIBOND 0 CT-1 CR 8X18" CX21D

## (undated) DEVICE — BAG CLEAR TRASH 1.3M 39X33" P4040C

## (undated) DEVICE — CAST PADDING 6" STERILE 9046S

## (undated) DEVICE — SYR 50ML CATH TIP W/O NDL 309620

## (undated) DEVICE — BONE CEMENT MIXEVAC III HI VAC KIT  0206-015-000

## (undated) DEVICE — LINEN FULL SHEET 5511

## (undated) DEVICE — GLOVE PROTEXIS BLUE W/NEU-THERA 8.0  2D73EB80

## (undated) DEVICE — GLOVE PROTEXIS POWDER FREE 8.0 ORTHOPEDIC 2D73ET80

## (undated) DEVICE — IMM KNEE 24" 08142674

## (undated) DEVICE — CAST PADDING 6" UNSTERILE 9046

## (undated) DEVICE — PACK TOTAL KNEE BOXED LATEX FREE PO15TKFCT

## (undated) DEVICE — LINEN HALF SHEET 5512

## (undated) DEVICE — CAST PADDING 4" UNSTERILE 9044

## (undated) DEVICE — SUCTION MANIFOLD NEPTUNE 2 SYS 4 PORT 0702-020-000

## (undated) DEVICE — DRAIN HEMOVAC RESERVOIR KIT 10FR 1/8" MED 00-2550-002-10

## (undated) DEVICE — BLADE SAW RECIP STRK LONG 70X12.5X0.9MM 0277-096-278

## (undated) RX ORDER — HYDRALAZINE HYDROCHLORIDE 20 MG/ML
INJECTION INTRAMUSCULAR; INTRAVENOUS
Status: DISPENSED
Start: 2019-03-14

## (undated) RX ORDER — FENTANYL CITRATE 50 UG/ML
INJECTION, SOLUTION INTRAMUSCULAR; INTRAVENOUS
Status: DISPENSED
Start: 2019-03-14

## (undated) RX ORDER — HYDROMORPHONE HYDROCHLORIDE 1 MG/ML
INJECTION, SOLUTION INTRAMUSCULAR; INTRAVENOUS; SUBCUTANEOUS
Status: DISPENSED
Start: 2019-03-14

## (undated) RX ORDER — CLONIDINE 100 UG/ML
INJECTION, SOLUTION EPIDURAL
Status: DISPENSED
Start: 2019-03-14

## (undated) RX ORDER — PROPOFOL 10 MG/ML
INJECTION, EMULSION INTRAVENOUS
Status: DISPENSED
Start: 2019-03-14

## (undated) RX ORDER — LIDOCAINE HYDROCHLORIDE 10 MG/ML
INJECTION, SOLUTION EPIDURAL; INFILTRATION; INTRACAUDAL; PERINEURAL
Status: DISPENSED
Start: 2019-03-14

## (undated) RX ORDER — ONDANSETRON 2 MG/ML
INJECTION INTRAMUSCULAR; INTRAVENOUS
Status: DISPENSED
Start: 2019-03-14

## (undated) RX ORDER — ROPIVACAINE HYDROCHLORIDE 7.5 MG/ML
INJECTION, SOLUTION EPIDURAL; PERINEURAL
Status: DISPENSED
Start: 2019-03-14

## (undated) RX ORDER — NEOSTIGMINE METHYLSULFATE 1 MG/ML
VIAL (ML) INJECTION
Status: DISPENSED
Start: 2019-03-14

## (undated) RX ORDER — DEXAMETHASONE SODIUM PHOSPHATE 4 MG/ML
INJECTION, SOLUTION INTRA-ARTICULAR; INTRALESIONAL; INTRAMUSCULAR; INTRAVENOUS; SOFT TISSUE
Status: DISPENSED
Start: 2019-03-14

## (undated) RX ORDER — CEFAZOLIN SODIUM 2 G/100ML
INJECTION, SOLUTION INTRAVENOUS
Status: DISPENSED
Start: 2019-03-14

## (undated) RX ORDER — KETOROLAC TROMETHAMINE 30 MG/ML
INJECTION, SOLUTION INTRAMUSCULAR; INTRAVENOUS
Status: DISPENSED
Start: 2019-03-14

## (undated) RX ORDER — GLYCOPYRROLATE 0.2 MG/ML
INJECTION INTRAMUSCULAR; INTRAVENOUS
Status: DISPENSED
Start: 2019-03-14